# Patient Record
Sex: FEMALE | Race: BLACK OR AFRICAN AMERICAN | NOT HISPANIC OR LATINO | Employment: FULL TIME | URBAN - METROPOLITAN AREA
[De-identification: names, ages, dates, MRNs, and addresses within clinical notes are randomized per-mention and may not be internally consistent; named-entity substitution may affect disease eponyms.]

---

## 2017-01-30 ENCOUNTER — TELEPHONE (OUTPATIENT)
Dept: OBSTETRICS AND GYNECOLOGY | Facility: CLINIC | Age: 32
End: 2017-01-30

## 2017-01-30 DIAGNOSIS — N91.2 AMENORRHEA: Primary | ICD-10-CM

## 2017-01-30 NOTE — TELEPHONE ENCOUNTER
----- Message from Dave Hinkle sent at 1/30/2017 11:39 AM CST -----  Please call and schedule new ob appt lmp 12/12 she can be reached @ [phone]

## 2017-01-31 NOTE — TELEPHONE ENCOUNTER
Pt's LMP 12/12/2016. Per Dr. Chavarria, scheduled pt's US and labs for 12/3/2017 and her visit with  for 2/15/2017. Pt communicated understanding.

## 2017-01-31 NOTE — TELEPHONE ENCOUNTER
----- Message from Dave Hinkle sent at 1/30/2017  4:20 PM CST -----  PT RETURNING YOUR CALL [phone]

## 2017-02-03 ENCOUNTER — PROCEDURE VISIT (OUTPATIENT)
Dept: OBSTETRICS AND GYNECOLOGY | Facility: CLINIC | Age: 32
End: 2017-02-03
Attending: OBSTETRICS & GYNECOLOGY
Payer: COMMERCIAL

## 2017-02-03 ENCOUNTER — LAB VISIT (OUTPATIENT)
Dept: LAB | Facility: OTHER | Age: 32
End: 2017-02-03
Attending: OBSTETRICS & GYNECOLOGY
Payer: COMMERCIAL

## 2017-02-03 ENCOUNTER — RESEARCH ENCOUNTER (OUTPATIENT)
Dept: RESEARCH | Facility: HOSPITAL | Age: 32
End: 2017-02-03

## 2017-02-03 DIAGNOSIS — N91.2 AMENORRHEA: ICD-10-CM

## 2017-02-03 LAB
ABO + RH BLD: NORMAL
BASOPHILS # BLD AUTO: 0.02 K/UL
BASOPHILS NFR BLD: 0.3 %
BLD GP AB SCN CELLS X3 SERPL QL: NORMAL
DIFFERENTIAL METHOD: ABNORMAL
EOSINOPHIL # BLD AUTO: 0.1 K/UL
EOSINOPHIL NFR BLD: 1.1 %
ERYTHROCYTE [DISTWIDTH] IN BLOOD BY AUTOMATED COUNT: 12.5 %
HCG INTACT+B SERPL-ACNC: NORMAL MIU/ML
HCT VFR BLD AUTO: 36.3 %
HGB BLD-MCNC: 12.2 G/DL
LYMPHOCYTES # BLD AUTO: 2.4 K/UL
LYMPHOCYTES NFR BLD: 30.3 %
MCH RBC QN AUTO: 32.2 PG
MCHC RBC AUTO-ENTMCNC: 33.6 %
MCV RBC AUTO: 96 FL
MONOCYTES # BLD AUTO: 0.5 K/UL
MONOCYTES NFR BLD: 6.6 %
NEUTROPHILS # BLD AUTO: 4.9 K/UL
NEUTROPHILS NFR BLD: 61.4 %
PLATELET # BLD AUTO: 331 K/UL
PMV BLD AUTO: 9.4 FL
RBC # BLD AUTO: 3.79 M/UL
WBC # BLD AUTO: 7.98 K/UL

## 2017-02-03 PROCEDURE — 86762 RUBELLA ANTIBODY: CPT

## 2017-02-03 PROCEDURE — 76801 OB US < 14 WKS SINGLE FETUS: CPT | Mod: S$GLB,,, | Performed by: PEDIATRICS

## 2017-02-03 PROCEDURE — 86850 RBC ANTIBODY SCREEN: CPT

## 2017-02-03 PROCEDURE — 86703 HIV-1/HIV-2 1 RESULT ANTBDY: CPT

## 2017-02-03 PROCEDURE — 87591 N.GONORRHOEAE DNA AMP PROB: CPT

## 2017-02-03 PROCEDURE — 86592 SYPHILIS TEST NON-TREP QUAL: CPT

## 2017-02-03 PROCEDURE — 36415 COLL VENOUS BLD VENIPUNCTURE: CPT

## 2017-02-03 PROCEDURE — 85025 COMPLETE CBC W/AUTO DIFF WBC: CPT

## 2017-02-03 PROCEDURE — 86900 BLOOD TYPING SEROLOGIC ABO: CPT

## 2017-02-03 PROCEDURE — 83036 HEMOGLOBIN GLYCOSYLATED A1C: CPT

## 2017-02-03 PROCEDURE — 83020 HEMOGLOBIN ELECTROPHORESIS: CPT

## 2017-02-03 PROCEDURE — 76817 TRANSVAGINAL US OBSTETRIC: CPT | Mod: S$GLB,,, | Performed by: PEDIATRICS

## 2017-02-03 PROCEDURE — 84702 CHORIONIC GONADOTROPIN TEST: CPT

## 2017-02-03 PROCEDURE — 87340 HEPATITIS B SURFACE AG IA: CPT

## 2017-02-03 NOTE — PROGRESS NOTES
2016.165.B TREETOP Consent.     I met with Ms. Valderrama. She is pregnant and here for a visit. I met her in OB ultrasound and escorted her to CTU. We discussed the study in detail, including the purpose, numbers/length, procedures, risk/benefit, cost/payment, contacts (investigators/[facility]), alternatives/voluntary nature/withdrawal, confidentiality/HIPPA. Dr. Mart  was available for questions. She verbalized understanding and had no questions. She declined consented for the optional blood storage and genetic sequencing. The consent form was signed on 2/3/17 at 11:25 am. She also signed a non-coercion statement with Amy Terrazas as a witness.

## 2017-02-03 NOTE — PROCEDURES
Procedures     Indication:     gestation age determination (BHARAT NELSON).   Maternal age (31 years).   ____________________________________________________________________________  History:     Age: 31 years. : 3 Para: 1.   Obstetric History: Mode of last delivery: Vaginal Delivery.  ____________________________________________________________________________  Dating:    LMP: 16 EDC: 17 GA by LMP: 7w4d  Current Scan on: 17 EDC: 17 GA by current scan: 7w4d      Best Overall Assessment: 17 EDC: 17 Assessed GA: 7w4d  The calculation of the gestational age by current scan was based on CRL.  The Best Overall Assessment is based on the LMP.  ____________________________________________________________________________  Early Pregnancy Assessment:    Biometry:  CRL 13.3 mm 91st% 7w4d (7w2d to 7w6d)  Gestational Sac present. Yolk Sac present. Embryo present.   Heart activity: present. Heart rate: 167 bpm.     ____________________________________________________________________________  Maternal Structures:    Right Ovary: not visible.   Left Ovary: normal.   Left Ovary size: 35 mm x 21 mm x 30 mm. Volume: 11.5 ml.   Corpus Luteum Left Ovary: 17 mm x 20 mm x 20 mm. Cystic.  Cul de Sac / Pouch of Melvin: no free fluid visible.  ____________________________________________________________________________  Report Summary:      Impression:     Single viable intrauterine pregnancy consistent with LMP dating.  Embryo grossly WNL with normal cardiac activity.    Normal uterus, cervix and adnexae as noted above.  No fluid seen in cul-de-sac.     Recommendations:     Suggest repeat scan as you feel clinically indicated.

## 2017-02-04 LAB
ESTIMATED AVG GLUCOSE: 103 MG/DL
HBA1C MFR BLD HPLC: 5.2 %
RPR SER QL: NORMAL

## 2017-02-06 LAB
C TRACH DNA SPEC QL NAA+PROBE: NEGATIVE
HBV SURFACE AG SERPL QL IA: NEGATIVE
HIV 1+2 AB+HIV1 P24 AG SERPL QL IA: NEGATIVE
N GONORRHOEA DNA SPEC QL NAA+PROBE: NEGATIVE
RUBV IGG SER-ACNC: 35.1 IU/ML
RUBV IGG SER-IMP: REACTIVE

## 2017-02-07 LAB
HGB FRACT BLD ELPH-IMP: NORMAL
HGB S MFR BLD ELPH: 0 %

## 2017-02-15 ENCOUNTER — OFFICE VISIT (OUTPATIENT)
Dept: OBSTETRICS AND GYNECOLOGY | Facility: CLINIC | Age: 32
End: 2017-02-15
Payer: COMMERCIAL

## 2017-02-15 VITALS
SYSTOLIC BLOOD PRESSURE: 118 MMHG | BODY MASS INDEX: 25.51 KG/M2 | DIASTOLIC BLOOD PRESSURE: 72 MMHG | WEIGHT: 162.5 LBS | HEIGHT: 67 IN

## 2017-02-15 DIAGNOSIS — O09.291 PRIOR PREGNANCY WITH FETAL DEMISE AND CURRENT PREGNANCY IN FIRST TRIMESTER: ICD-10-CM

## 2017-02-15 DIAGNOSIS — Z34.80 SUPERVISION OF OTHER NORMAL PREGNANCY: ICD-10-CM

## 2017-02-15 PROCEDURE — 87086 URINE CULTURE/COLONY COUNT: CPT

## 2017-02-15 PROCEDURE — 87186 SC STD MICRODIL/AGAR DIL: CPT

## 2017-02-15 PROCEDURE — 0500F INITIAL PRENATAL CARE VISIT: CPT | Mod: S$GLB,,, | Performed by: OBSTETRICS & GYNECOLOGY

## 2017-02-15 PROCEDURE — 87088 URINE BACTERIA CULTURE: CPT

## 2017-02-15 PROCEDURE — 87077 CULTURE AEROBIC IDENTIFY: CPT

## 2017-02-15 PROCEDURE — 99999 PR PBB SHADOW E&M-EST. PATIENT-LVL III: CPT | Mod: PBBFAC,,, | Performed by: OBSTETRICS & GYNECOLOGY

## 2017-02-15 NOTE — PATIENT INSTRUCTIONS
Comfort Tips During Pregnancy  Talk with your healthcare provider before using pain-relieving medicine at any time during your pregnancy.    First trimester tips  Nausea  · Get up slowly. Eat a few unsalted crackers before you get out of bed.  · Avoid smells that bother you.  · Eat small bland low fat, light high-protein meals at frequent intervals.  · Sip on water, weak tea, or clear soft drinks, like ginger ale. Eat ice chips.  Fatigue  · Take catnaps when you can.  · Get regular exercise.  · Accept help from others.  · Practice good sleep habits, like going to bed and getting up at the same time each day. Use your bed only for sleep and sex.  Mood swings  · Talk about your feelings with others, including other mothers.  · Limit sugar, chocolate, and caffeine.  · Eat a healthy diet. Dont skip meals.  · Get regular exercise.  Headaches  · Get fresh air and exercise.  · Relax and get enough rest.  · Check with your healthcare provider before taking any pain medicines.  Second trimester tips  Here are some suggestions to help you cope:  · To limit ankle swelling, sit with your feet raised or wear support hose.  · If you have pain in your groin and stomach (round ligament pain), avoid sudden twisting movements.  · For leg cramps, flexing your foot often brings immediate relief. You also may try massaging your calf in long, downward strokes, or stretching your legs before going to bed. Get enough exercise and wear shoes with flexible soles.  Third trimester tips  Reducing heartburn  · Eat small, light meals throughout the day rather than 3 large ones.  · Sleep with your upper body raised 6 inches. Dont lie down until 2 hours after you eat.  Treating constipation  · Eat foods high in fiber (whole-grain foods, fresh fruit and vegetables).  · Drink plenty of water.  · Get regular exercise.  · Discuss other medicines (like docusate and psyllium) with your healthcare provider.  Taking care of your breasts  · Avoid using  harsh soaps or alcohol, which can cause excessive dryness.  · Wear nursing bras. They provide more support than regular bras and can be used after pregnancy if you breastfeed.  Getting a good nights sleep  · Take a warm shower before bed.  · Sleep on a firm mattress.  · Lie on your side with 1 leg crossed over the other.  · Use pillows to support arms, legs, and belly.  Date Last Reviewed: 8/16/2015  © 9594-9505 [employer]. [address] [place] [address], [place] [address]. All rights reserved. This information is not intended as a substitute for professional medical care. Always follow your healthcare professional's instructions.        Established Pregnancy, Normal Symptoms    You are pregnant and are having symptoms that worry you. During pregnancy, its normal to have many kinds of symptoms. Here is a list of common symptoms that happen during pregnancy.  Head and mouth  · Bleeding gums  · Dizziness and fainting  · Extra saliva  · Headaches  · Nosebleeds  · Skin color changes on your face  · Stuffy nose  · Mild blurriness of vision, especially if you wear contact lenses  Breasts  · Darkening of nipples  · Yellow or white discharge from the nipples  · Sore breasts and nipples  · Swollen breasts  Back, arms, and legs  · Back, hip, or thigh pain  · Leg cramps that come and go  · Numbness and tingling in your hands and fingers  · Swollen hands, legs, and feet  · Swollen leg veins  Belly (abdomen) and pelvis  · Constipation  · Feeling of pressure on your bladder and stomach  · Need to urinate often  · Gas and bloating  · Heartburn  · Anal itching, swelling, and bleeding (hemorrhoids)  · Leaking urine  · Mild pressure or cramping in your belly  · Nausea and vomiting throughout the day or night (morning sickness)  · Swollen belly  · Clear to white vaginal discharge  Other symptoms  · Dry, itchy skin  · Forgetfulness  · Less interest in sex  · Mood swings  · Tiredness  · Trouble sleeping  Home care  Here is  information that may help relieve some common pregnancy symptoms.  Sore and swollen breasts  · Wear a support bra that fits properly.  Nausea and indigestion  · Eat smaller meals or snacks more often.  · Eat bland foods, such as bananas, crackers, or rice.  · Stay away from spicy, fatty, or fried foods.  · Stay away from alcohol, caffeine, and tobacco.  · Dont lie down right after eating.  · Raise your head with pillows when you lie down.  · Eat foods or beverages that have ginger. If you drink ginger ale, be sure to make sure it has real ginger and not just ginger flavoring.  Leg swelling and varicose veins  · Wear elastic support hose. Put your feet up as often as possible.  Constipation  · Eat more fresh fruits and vegetables and more whole grains. Drink more clear liquids.  Joint and muscle pains  · Avoid heavy lifting.  · Pick things up by bending at your knees, not at your waist.  · Use acetaminophen for joint and muscle pain. Don't use aspirin, ibuprofen, or naproxen.  Mouth and nose dryness or bleeding  · Drink more liquids. Use a vaporizer or humidifier in your bedroom.  Dont take medicines or use remedies that your healthcare provider hasnt approved. If you have symptoms that are severe or sudden, call your healthcare provider.  Call 911  Call 911 if any of these occur:  · New chest, arm, shoulder, neck, or upper back pain  · Trouble breathing  · Severe belly pain or very heavy bleeding  · Severe lightheadedness, passing out, or fainting  · Rapid heart rate  · Confusion or difficulty waking up  When to seek medical advice  Call your healthcare provider right away if any of these occur:  · Burning or pain when you urinate  · Depression or severe anxiety  · Desire to eat or drink nonfood items such as paper, dirt, or cleaning products  · Diarrhea that lasts more than 24 hours  · Fast heartbeat or heart palpitations  · Fever of 100.4°F (38°C) or higher, or as directed by your healthcare provider  · You  cant keep fluids down for 6 hours without vomiting  · Severe or ongoing vomiting  · Little or no urine  · Major vision changes  · Moderate or severe belly pain  · Severe back pain  · Severe constipation  · Severe cramping or swelling in a leg, especially if its just on one side  · Severe headache  · Sudden swelling of your face, hands, feet, or ankles  · Vaginal bleeding  · Very itchy skin that doesnt get better  Date Last Reviewed: 10/1/2016  © 5275-2758 [employer]. [address] [place] [address], [place] [address]. All rights reserved. This information is not intended as a substitute for professional medical care. Always follow your healthcare professional's instructions.        Healthy Eating Habits During Pregnancy    Its important to develop healthy eating habits while you are pregnant, for you as well as for your baby. Here are some ways to stay healthy.  Aim for a healthy weight  A slow, steady rate of weight gain is often best. After the first trimester, you may gain about a pound a week. If you were overweight before pregnancy, you need to gain fewer pounds. Your healthcare provider can give you a healthy weight goal for your pregnancy.  Dont diet  Now is not the time to diet. You may not get enough of the nutrients you and your baby need. Instead, learn how to be a healthy eater. Start by doing it for your baby. Soon, you may do it for yourself.  Vitamins and supplements  Talk with your healthcare provider about taking these and other prenatal vitamins and supplements.  · Iron makes the extra blood you need now.  · Calcium and vitamin D help build and keep strong bones.  · Folic acid helps prevent certain birth defects.  · Some vitamins may not be safe to take. Your healthcare provider will tell you which ones to avoid.  Fluids  Drink at least 8 to 10 cups of fluid daily. Your baby needs fluids. Fluids also decrease constipation, flush out toxins and waste, limit swelling, and help prevent  bladder infections. Water is best. Other good choices are:  · Water or seltzer water with a slice of lemon or lime. (These can also help ease an upset stomach.)  · Clear soups that are low in salt  · Low-fat or fat-free milk; soy or rice milk with calcium added  · 100% fruit juices mixed with water  · Popsicles or gelatin  Things to avoid  Some things might harm your growing baby. Dont eat or drink:  · Alcohol  · Unpasteurized dairy foods and juices  · Raw or undercooked meat, poultry, fish, or eggs  · Prepared meats, like deli meats or hot dogs, unless heated until steaming hot  · Fish that are high in mercury, like shark, swordfish, keyla mackerel, tilefish, and albacore tuna   Things to limit  Ask your healthcare provider whether its safe to eat or drink:  · Caffeine  · Artificial sweeteners  · Organ meats  · Certain types of fish  · Fish and shellfish that contain mercury in lower amounts, like shrimp, canned light tuna, salmon, pollock, and catfish   Date Last Reviewed: 8/16/2015  © 2687-3268 [employer]. [address] [place] [address]. All rights reserved. This information is not intended as a substitute for professional medical care. Always follow your healthcare professional's instructions.        Exercise During Pregnancy  Regular exercise can help you adapt to the changes your body is going through during pregnancy. Exercising may help you relax, and it gets you ready for labor and delivery. Talk to your healthcare provider about the kinds of activities you can do. Then go ahead and enjoy them.    Get started  Even if you didnt exercise before pregnancy, it is not too late to start. Choose an activity that you like and that fits your lifestyle. Begin slowly and build up a little at a time. Be sure to check with your healthcare provider before starting any exercise program. The following tips may help you get started:  · Choose a time and place to exercise each day.  · Wear  loose-fitting clothes and comfortable athletic shoes.  · Stretch before and after you exercise. (Be sure to stretch slowly and to hold stretches for 30 to 40 seconds.)  Be active  Unless your healthcare provider says otherwise, try to exercise for 30 minutes or more most days of the week:  · Overall conditioning, like swimming, bicycling, or walking, is especially beneficial.  · Aerobics and exercises that increase your pulse rate help condition your body and strengthen your heart. Ask about special prenatal aerobics classes.  Exercise safely  These tips will help you have a safe, healthy workout:  · Stay cool. Stop exercising if you feel overheated.  · Slow down if youre out of breath. If you cant talk during exercise, lower the intensity of the workout.  · Monitor the intensity of your workout. Only do moderate-intensity (not strenuous) exercise.  · Stay off your back. Lying on your back can decrease blood flow to your baby.  · Drink water before, during and after your workout.  · Eat 300 extra calories a day. A light snack before and after you exercise will help keep your energy up.  · Avoid activities requiring balancing skills later in pregnancy.  Do Kegel exercises  Kegel exercises strengthen the pelvic floor muscles used in childbirth. These muscles are the same ones used to stop the flow of urine. Do Kegel exercises daily:  · Squeeze your pelvic floor muscles for a count of 3.  · Relax, then squeeze again.  · Repeat 10-15 times in a row at least 3 times a day.  · You can do Kegel exercises anytime and anywhere.  Keep walking  No matter what other exercise you do, try to walk whenever you can:  · If youre working all day, take a lunchtime walk in the park with a friend.  · When you shop, park away from the store entrance and walk the extra distance.  · Take the stairs instead of the elevator.     When to stop exercising and call your healthcare provider  Call your healthcare provider right away if you  have:  · Shortness of breath before starting exercise  · Vaginal bleeding  · Dizziness or feeling faint  · Chest pain  · Headache  · Decreased fetal movement  ·  contractions   · Muscle weakness  · Calf pain or swelling  · Fluid leaking from the vagina   Date Last Reviewed: 2015 [employer]. [address] [place] [address]. All rights reserved. This information is not intended as a substitute for professional medical care. Always follow your healthcare professional's instructions.        Nutrition During Pregnancy    Having a healthy baby depends mostly on you. What you eat matters to your baby and your health. During pregnancy, you will likely need about 300 more calories per day than before you became pregnant. Each day, try to eat the number of servings listed here for each food group. In addition, cut down on salt and caffeine. Limit the amount of sweets and high-fat foods you eat. Dont smoke or drink alcohol.  Important: See your healthcare provider as often as requested. If you have any questions, be sure to ask them.  Fruits  2 cups  Examples of 1-cup servings:  1 medium apple  1 medium orange  1 medium banana  1 cup chopped fruit  1 cup 100% fruit juice (pasteurized)  1/2 cup dried fruit Vegetables  2-1/2 to 3 cups   Examples of 1 servin cups raw, leafy greens  1 cup raw or cooked cut-up vegetables  1 cup 100% vegetable juice (pasteurized) Grains & Cereals*  6 to 8 ounces  Examples of 1-ounce servings:  1 slice bread  1/2 cup cooked rice  1/2 cup cooked cereal  1/2 cup pasta  1 ounce cold cereal Fats & Oils  6 to 8 teaspoons   Dairy**  3 cups  Examples of 1-cup servings:  1 cup milk  1 cup yogurt  1-1/2 ounces natural cheese  2 ounces processed cheese Protein---  5 to 6-1/2 ounces  Examples of 1-ounce servings:  1 egg  1 ounce of lean meat, poultry, or fish  1/4 cup cooked beans  1 tablespoon peanut butter  1/2 ounce nuts Fluids  8 or more 8-ounce  glasses  Examples:  Water  Diluted juices: Apple, orange, cranberry  Mineral water  Clear soups, broth     *Note: Choose whole grains whenever possible.  ** Note: Try to choose low-fat options; avoid soft cheeses and unpasteurized milk.  --- Notes: Avoid raw or undercooked meats, eggs, and seafood. fish, and shellfish. Also, some types of fish, like shark, swordfish, and keyla mackerel should not be eaten during pregnancy. Avoid hot dogs, luncheon meats, and cold cuts unless heated to steaming just prior to being served. Ask your healthcare provider about safe choices.     Prenatal supplements  A prenatal supplement is a pill that you take daily during pregnancy. It helps make sure youre getting the right amount of certain nutrients that are important to your baby. Ask your healthcare provider to help you choose the best one for you. Important nutrients during pregnancy include:  · Folic acid. It's best to start taking this supplement 1 month before you start trying to get pregnant. Folic acid helps prevent certain problems in your baby. During pregnancy, you need to take 400 micrograms (mcg) of folic acid every day for the first 2 to 3 months after conception, and then 600 mcg is needed for growing fetus and placenta.  · Iron, calcium, and vitamin D. You may also be advised to take these supplements during pregnancy. They help keep you and your baby healthy. Be sure to take them at different times because calcium makes it hard for the body to absorb iron. Taking iron with orange juice helps to increase its absorption.   Date Last Reviewed: 8/9/2015 © 2000-2016 [employer]. [address] [place] [address], [place] [address]. All rights reserved. This information is not intended as a substitute for professional medical care. Always follow your healthcare professional's instructions.        Pregnancy    Your exam today shows that you are pregnant.  Pregnancy symptoms  During pregnancy your bodys hormones  change. This causes physical and emotional changes. This is normal. Knowing what to expect is important for your piece of mind and so you know when to seek help for a problem. Here are some of the most common symptoms:  · Morning sickness or nausea. This can happen any time of the day or night.  · Tender, swollen breasts  · Need to urinate frequently  · Tiredness or fatigue  · Dizziness  · Indigestion or heartburn  · Food cravings or turn-offs  · Constipation  · Emotional changes. This can range from anxiety to excitement to depression.  General care for a healthy pregnancy  Here are things you can do to help make sure your baby is born healthy:  · Rest when you feel tired. This is especially true in the later months of pregnancy.  · Drink more fluids. Your body needs more fluids than you may be used to. Drink 8 to10 glasses of juice, milk, or water every day.  · Eat well-balanced meals. Eat at regular times to give your body enough protein. You can expect to gain about 30 pounds during the pregnancy. Dont try to diet or lose weight while you are pregnant.  · Take a prenatal vitamin every day. This helps you meet the extra nutritional needs of pregnancy.  · Dont take any other medicine during your pregnancy unless your healthcare provider tells you to. This includes prescription medicines and those you buy over the counter. Many medicines can harm the growing baby.  · If you have nausea or vomiting, dont eat greasy or fried foods. Eat several smaller meals throughout the day rather than 3 large meals.  · If you smoke, you must stop. The nicotine you breathe in goes right to the baby.  · Stay away from alcohol, even in moderate amounts. Daily drinking will harm your baby and can cause permanent brain damage.  · Dont use recreational drugs, especially cocaine, crack, and heroin. These will harm your baby. Also avoid marijuana.  · If you were using recreational drugs or prescribed medicine when you found out that  you were pregnant, talk with your healthcare provider about possible effects on your growing baby.  · If you have medical problems that you need to take medicine for, talk with your healthcare provider.  Follow-up care  Call your healthcare provider to arrange for prenatal care. Prenatal care is important. You can see your family provider, a pregnancy specialist (obstetrician), or a primary care clinic.  When to seek medical advice  Call your healthcare provider right away if any of these occur:  · Vaginal bleeding  · Pain in your belly (abdomen) or back that is moderate or severe  · Lots of vomiting, or you cant keep any fluids down for 6 hours  · Burning feeling when you urinate  · Headache, dizziness, or rapid weight gain  · Fever  · Vision changes or blurred vision  Date Last Reviewed: 10/1/2016  © 8576-0327 [employer]. [address] [place] [address], [place] [address]. All rights reserved. This information is not intended as a substitute for professional medical care. Always follow your healthcare professional's instructions.

## 2017-02-15 NOTE — PROGRESS NOTES
CC: Absence of menses    Maribel Valderrama is a 31 y.o. female  with Patient's last menstrual period was 2016 (exact date). presents with complaint of absence of menstruation.  She reports nausea, Denies vomIting/abdominal pain/bleeding.  UPT is positive.     History reviewed. No pertinent past medical history.  Past Surgical History   Procedure Laterality Date    Ovarian teratoma removal Right 2010     15 weeks gestation, DERMOID RIGHT     Da sary-assisted resection of complex left ovarian cyst x2 Left 2016     DONNA done at time of surgery -Dermoid and Hemorrhagic ovarian cyst     Social History     Social History    Marital status:      Spouse name: Nomi    Number of children: 1    Years of education: N/A     Occupational History    Registered Nurse       [employer]     Social History Main Topics    Smoking status: Never Smoker    Smokeless tobacco: Never Used    Alcohol use No    Drug use: No    Sexual activity: Yes     Partners: Male     Birth control/ protection: None      Comment:  to [facility] since      Other Topics Concern    None     Social History Narrative     Family History   Problem Relation Age of Onset    Hypertension Mother     Hypertension Father     Breast cancer Other     Cancer Other      cervical cancer    Diabetes Maternal Uncle     Ovarian cancer Neg Hx     Stroke Neg Hx     Colon cancer Neg Hx     Celiac disease Neg Hx     Cirrhosis Neg Hx     Colon polyps Neg Hx     Crohn's disease Neg Hx     Esophageal cancer Neg Hx     Inflammatory bowel disease Neg Hx     Liver cancer Neg Hx     Liver disease Neg Hx     Rectal cancer Neg Hx     Stomach cancer Neg Hx     Ulcerative colitis Neg Hx      OB History    Para Term  AB SAB TAB Ectopic Multiple Living   3 1 1  1 1    1      # Outcome Date GA Lbr Zeus/2nd Weight Sex Delivery Anes PTL Lv   3 Current            2 SAB 16 18w5d 20:00 / 00:13 0.28 kg (9.9 oz)  "M Vag-Spont OTHER Y FD   1 Term 09/25/10 39w4d  3.697 kg (8 lb 2.4 oz) M Vag-Spont EPI  Y          Visit Vitals    /72    Ht 5' 7" (1.702 m)    Wt 73.7 kg (162 lb 7.7 oz)    LMP 12/12/2016 (Exact Date)    BMI 25.45 kg/m2       ROS:  GENERAL: Denies weight gain or weight loss. Feeling well overall.   SKIN: Denies rash or lesions.   HEAD: Denies head injury or headache.   NODES: Denies enlarged lymph nodes.   CHEST: Denies chest pain or shortness of breath.   CARDIOVASCULAR: Denies palpitations or left sided chest pain.   ABDOMEN: No abdominal pain, constipation, diarrhea, nausea, vomiting or rectal bleeding.   URINARY: No frequency, dysuria, hematuria, or burning on urination.  REPRODUCTIVE: See HPI.   BREASTS: The patient performs breast self-examination and denies pain, lumps, or nipple discharge.   HEMATOLOGIC: No easy bruisability or excessive bleeding.   MUSCULOSKELETAL: Denies joint pain or swelling.   NEUROLOGIC: Denies syncope or weakness.   PSYCHIATRIC: Denies depression, anxiety or mood swings.    PE:   APPEARANCE: Well nourished, well developed, in no acute distress.  AFFECT: WNL, alert and oriented x 3.  SKIN: No acne or hirsutism.  NECK: Neck symmetric without masses or thyromegaly.  NODES: No inguinal, cervical, axillary or femoral lymph node enlargement.  CHEST: Good respiratory effort.   ABDOMEN: Soft. No tenderness or masses. No hepatosplenomegaly. No hernias.  BREASTS: Symmetrical, no skin changes or visible lesions. No palpable masses, nipple discharge bilaterally.  PELVIC: Normal external female genitalia without lesions. Normal hair distribution. Adequate perineal body, normal urethral meatus. Vagina moist and well rugated without lesions or discharge. Cervix pink, without lesions, discharge or tenderness. No significant cystocele or rectocele. Bimanual exam shows uterus is 9 weeks, regular, mobile and nontender. Adnexa without masses or tenderness.  EXTREMITIES: No " edema.          ASSESSMENT and PLAN:    ICD-10-CM ICD-9-CM    1. Supervision of other normal pregnancy Z34.80 V22.1 US MFM Procedure ([facility])      Urine culture   2. Prior pregnancy with fetal demise and current pregnancy in first trimester O09.291 V23.5 US MFM Procedure ([facility])      Ambulatory consult to Maternal Fetal Medicine         Patient was counseled today on proper weight gain based on the [place] [facility]'s recommendations based on her pre-pregnancy weight. Discussed foods to avoid in pregnancy (i.e. sushi, fish that are high in mercury, deli meat, and unpasteurized cheeses). Discussed prenatal vitamin options (i.e. stool softener, DHA). Contingency screen offered - patient requests.    Return in about 4 weeks (around 3/15/2017).

## 2017-02-18 LAB
BACTERIA UR CULT: NORMAL
BACTERIA UR CULT: NORMAL

## 2017-02-20 ENCOUNTER — TELEPHONE (OUTPATIENT)
Dept: OBSTETRICS AND GYNECOLOGY | Facility: CLINIC | Age: 32
End: 2017-02-20

## 2017-02-20 DIAGNOSIS — N39.0 UTI (URINARY TRACT INFECTION), UNCOMPLICATED: Primary | ICD-10-CM

## 2017-02-20 RX ORDER — AMOXICILLIN 250 MG/1
250 CAPSULE ORAL 3 TIMES DAILY
Qty: 21 CAPSULE | Refills: 0 | Status: SHIPPED | OUTPATIENT
Start: 2017-02-20 | End: 2017-02-27

## 2017-03-09 ENCOUNTER — OFFICE VISIT (OUTPATIENT)
Dept: MATERNAL FETAL MEDICINE | Facility: CLINIC | Age: 32
End: 2017-03-09
Attending: OBSTETRICS & GYNECOLOGY
Payer: COMMERCIAL

## 2017-03-09 ENCOUNTER — OFFICE VISIT (OUTPATIENT)
Dept: OBSTETRICS AND GYNECOLOGY | Facility: CLINIC | Age: 32
End: 2017-03-09
Payer: COMMERCIAL

## 2017-03-09 VITALS
SYSTOLIC BLOOD PRESSURE: 120 MMHG | BODY MASS INDEX: 25.41 KG/M2 | DIASTOLIC BLOOD PRESSURE: 81 MMHG | WEIGHT: 162.25 LBS

## 2017-03-09 VITALS
HEIGHT: 67 IN | BODY MASS INDEX: 25.67 KG/M2 | DIASTOLIC BLOOD PRESSURE: 81 MMHG | SYSTOLIC BLOOD PRESSURE: 120 MMHG | WEIGHT: 163.56 LBS

## 2017-03-09 DIAGNOSIS — Z36.89 ENCOUNTER FOR FETAL ANATOMIC SURVEY: Primary | ICD-10-CM

## 2017-03-09 DIAGNOSIS — N89.8 VAGINAL DISCHARGE: ICD-10-CM

## 2017-03-09 DIAGNOSIS — Z3A.12 12 WEEKS GESTATION OF PREGNANCY: Primary | ICD-10-CM

## 2017-03-09 DIAGNOSIS — O09.291 PRIOR PREGNANCY WITH FETAL DEMISE AND CURRENT PREGNANCY IN FIRST TRIMESTER: ICD-10-CM

## 2017-03-09 DIAGNOSIS — Z87.440 HISTORY OF UTI: ICD-10-CM

## 2017-03-09 DIAGNOSIS — Z36.82 ENCOUNTER FOR NUCHAL TRANSLUCENCY TESTING: ICD-10-CM

## 2017-03-09 DIAGNOSIS — O09.291 PRIOR POOR OBSTETRICAL HISTORY IN FIRST TRIMESTER, ANTEPARTUM: ICD-10-CM

## 2017-03-09 DIAGNOSIS — Z34.80 SUPERVISION OF OTHER NORMAL PREGNANCY: ICD-10-CM

## 2017-03-09 LAB
CANDIDA RRNA VAG QL PROBE: NEGATIVE
G VAGINALIS RRNA GENITAL QL PROBE: NEGATIVE
T VAGINALIS RRNA GENITAL QL PROBE: NEGATIVE

## 2017-03-09 PROCEDURE — 87077 CULTURE AEROBIC IDENTIFY: CPT

## 2017-03-09 PROCEDURE — 87186 SC STD MICRODIL/AGAR DIL: CPT

## 2017-03-09 PROCEDURE — 87480 CANDIDA DNA DIR PROBE: CPT

## 2017-03-09 PROCEDURE — 76801 OB US < 14 WKS SINGLE FETUS: CPT | Mod: 26,S$GLB,, | Performed by: OBSTETRICS & GYNECOLOGY

## 2017-03-09 PROCEDURE — 0502F SUBSEQUENT PRENATAL CARE: CPT | Mod: S$GLB,,, | Performed by: NURSE PRACTITIONER

## 2017-03-09 PROCEDURE — 87088 URINE BACTERIA CULTURE: CPT

## 2017-03-09 PROCEDURE — 99999 PR PBB SHADOW E&M-EST. PATIENT-LVL III: CPT | Mod: PBBFAC,,, | Performed by: NURSE PRACTITIONER

## 2017-03-09 PROCEDURE — 87086 URINE CULTURE/COLONY COUNT: CPT

## 2017-03-09 PROCEDURE — 99999 PR PBB SHADOW E&M-EST. PATIENT-LVL II: CPT | Mod: PBBFAC,,,

## 2017-03-09 PROCEDURE — 99212 OFFICE O/P EST SF 10 MIN: CPT | Mod: 25,S$GLB,, | Performed by: OBSTETRICS & GYNECOLOGY

## 2017-03-09 NOTE — PROGRESS NOTES
Here in urgent care at 12w3d with c/o a vaginal odor x 4 weeks. Denies abnormal discharge. Denies VB and cramping. Had MFM ultrasound this morning. Recently tx for a UTI-finished her meds. Urine cx SOLO done today- Affirm pending.   Pain, bleeding, and ED precautions given.  F/U scheduled 2 weeks with Dr. Chavarria.

## 2017-03-09 NOTE — PROGRESS NOTES
Patient to [facility] clinic for a consult and ultrasound. During consultation with Dr. Solis, patient informed physician that she was recently diagnosed with a urinary tract infection and Dr. Chavarria had started her on antibiotics. Patient has completed the antibiotic dose, but still has complaints of a vaginal odor. Patient would like to be seen if possible.    Dr. Chavarria's office notified and aware. Patient scheduled for an appointment today at the OB Urgent Care center today at 11:40 am.     Patient verbalized understanding of information received.

## 2017-03-09 NOTE — LETTER
March 12, 2017      Alyssia Chavarria MD  [address]  [address]  [facility] [address]           [facility]  [address]  [facility] [address]  Phone: [phone]          Patient: Maribel Valderrama   MR Number: [id number]   YOB: 1985   Date of Visit: 3/9/2017       Dear Dr. Alyssia Chavarria:    Thank you for referring Maribel Valderrama to me for evaluation. Attached you will find relevant portions of my assessment and plan of care.    If you have questions, please do not hesitate to call me. I look forward to following Maribel Valderrama along with you.    Sincerely,    Mary Anne Solis MD    Enclosure  CC:  No Recipients    If you would like to receive this communication electronically, please contact [email or web address] or [phone] to request more information on [employer] Link access.    For providers and/or their staff who would like to refer a patient to [facility], please contact us through our one-stop-shop provider referral line, [facility], at [phone].    If you feel you have received this communication in error or would no longer like to receive these types of communications, please e-mail [email or web address]

## 2017-03-09 NOTE — MR AVS SNAPSHOT
[facility] [address]  [address]   [address]  [facility] [address]  Phone: [phone]                  Maribel Valderrama   3/9/2017 11:40 AM   Office Visit    Description:  Female : 1985   Provider:  Nathaly Mendoza NP   Department:  [facility] [address]           Reason for Visit     Vaginal Discharge           Diagnoses this Visit        Comments    12 weeks gestation of pregnancy    -  Primary            To Do List           Future Appointments        Provider Department Dept Phone    3/20/2017 10:00 AM Alyssia Chavarrai MD [facility] [address] [phone]    2017 3:40 PM ULTRASOUND, [facility] 4TH FLR CLINIC [facility] [phone]      Goals (5 Years of Data)     None      Follow-Up and Disposition     Return if symptoms worsen or fail to improve.      [facility] On Call     [facility] On Call Nurse Care Line -  Assistance  Registered nurses in the [facility] On Call Center provide clinical advisement, health education, appointment booking, and other advisory services.  Call for this free service at [phone].             Medications           Message regarding Medications     Verify the changes and/or additions to your medication regime listed below are the same as discussed with your clinician today.  If any of these changes or additions are incorrect, please notify your healthcare provider.             Verify that the below list of medications is an accurate representation of the medications you are currently taking.  If none reported, the list may be blank. If incorrect, please contact your healthcare provider. Carry this list with you in case of emergency.           Current Medications     PRENATAL VIT W-CA,FE,FA,<1 MG, (PRENATAL VITAMIN ORAL) Take by mouth.           Clinical Reference Information           Prenatal Vitals     Enc. Date GA Prenatal Vitals Prenatal Pulse Pain Level Urine Albumin/Glucose Edema Presentation  "Dilation/Effacement/Station    3/9/17 12w3d 120/81 / 73.6 kg (162 lb 4.1 oz)           3/9/17 12w3d 120/81 / 74.2 kg (163 lb 9.3 oz)           2/15/17 9w2d 118/72 / 73.7 kg (162 lb 7.7 oz) 9 cm / 176    None / None / None         TW.2 kg (2 lb 10.3 oz)   Pregravid weight: 73 kg (160 lb 15 oz)   Number of fetuses: 1   Height: 5' 7" (1.702 m)   BMI: 25.2       Your Vitals Were     BP Height Weight Last Period BMI    120/81 5' 7" (1.702 m) 74.2 kg (163 lb 9.3 oz) 2016 (Exact Date) 25.62 kg/m2      Blood Pressure          Most Recent Value    BP  120/81      Allergies as of 3/9/2017     No Known Allergies      Immunizations Administered on Date of Encounter - 3/9/2017     None      Language Assistance Services     ATTENTION: Language assistance services are available, free of charge. Please call [phone].      ATENCIÓN: Si habla español, tiene a florentino disposición servicios gratuitos de asistencia lingüística. Llame al [phone].     RONA Ý: N?u b?n nói Ti?ng Vi?t, có các d?ch v? h? tr? ngôn ng? mi?n phí dành cho b?n. G?i s? [phone].         [facility] [address] complies with applicable Federal civil rights laws and does not discriminate on the basis of race, color, national origin, age, disability, or sex.        "

## 2017-03-09 NOTE — PROGRESS NOTES
Indication  ========    Consult: First trimester screening /Hx loss at 18wks (BHARAT NELSON).    History  ======    Medical History  Past surgical history: Previous surgeries performed  Surgery: dermoid removal  Year   Details: from right ovary (April)  Surgery: dermoid removal  Year   Details: from left ovary ()  Previous Outcomes  Preg. no. 1  Outcome: Live birth  Gest. age 39 w + 4 d  Gender: male  Details: 8il2oz,   Preg. no. 2  Outcome: Spontaneous miscarriage  Gest. age 18 w + 0 d  Details: demise   3  Para 1  Matias children born living (T) 1  Matias children born (T) 1  Abortions (A) 1  Matias living children (L) 1  Miscarriages 1    Maternal Assessment  =================    Weight 74 kg  Weight (lb) 163 lb  BP syst 120 mmHg  BP diast 81 mmHg    Method  ======    Transabdominal ultrasound examination. View: Good view.    Pregnancy  =========    Matias pregnancy. Number of fetuses: 1.    Dating  ======    LMP on: 2016  Cycle: regular cycle  GA by LMP 12 w + 3 d  BEBE by LMP: 2017  Ultrasound examination on: 3/9/2017  GA by U/S based upon: CRL  GA by U/S 13 w + 1 d  BEBE by U/S: 2017  Assigned: The Best Overall Assessment is based on the LMP.  Assigned GA 12 w + 3 d  Assigned BEBE: 2017    General Evaluation  ==============    Cardiac activity: present.  Placenta: posterior.  Cord vessels: 3 vessel cord.  Amniotic fluid: normal amount.    Fetal Biometry  ============    CRL 69.7 mm 90% 13w 1d Hadlock   bpm 51% Nicolaides    Fetal Anatomy  ============    The following structures appear normal:  Cranium. Neck. Thorax. Abdominal wall.    The following structures were visualized:  GI tract. Urogenital tract. Arms. Legs.    Maternal Structures  ===============    Uterus / Cervix  Uterus: Normal  Cervix: Normal  Ovaries / Tubes / Adnexa  Rt ovary: Visualized  Rt ovary D1 2.6 cm  Rt ovary D2 2.2 cm  Rt ovary D3 1.2 cm  Rt ovary mean 2.0 cm  Rt ovary  vol 3.5 cm³  Lt ovary: Visualized  Lt ovary D1 3.4 cm  Lt ovary D2 2.6 cm  Lt ovary D3 2.5 cm  Lt ovary mean 2.8 cm  Lt ovary vol 11.7 cm³  Lt ovarian cyst(s): Cysts identified  Findings: Simple cyst  D1 20.2 mm  D2 15.5 mm  D3 16.9 mm  Mean 17.5 mm  Vol 2.771 cm³  Pouch of Melvin / Other Structures  Cul de Sac: Visualized    Consultation  ==========    Patient presents to [facility] clinic for consultation and ultrasound    Referring/consulting MD: Dr. Chavarria    32 y.o.  at 12w 3d gestation    Ob hx:  2010, 39w 4d, male, 8 lbs 2oz,   During pregnancy in April patient had laparoscopic removal of dermoid from right ovary  2. 2016: per gestational age was 18 wks with missed   -went to a 4D facility and was informed of no FHR  -same day went to a hospital facility where an ultrasound was performed: patient reported she was  Told fetus more consistent with about 15 wks gestation/no FHR  -induced with   -Pathology report:  -fetus measured 55 grams suggestive of about 14-15 wks  -placenta: wt c/w 14 wks gestation, amnion/chorion with mild acute amnionitis/focal meconium within amnion  Chorionic villi with focal acute intervillositis/microabscesses and fibrin      Past medical hx: noncontributory    Gyn HX  1. 2010 during pregnancy had laparoscopic removal of right dermoid  2.  laparoscopic removal of left hemorrhagic ovarian cyst    Ultrasound performed: see report for details    Counseling: NT screen  Reason for referral: NT screen and counseling    The patient was counseled on NT screen including sensitivities, false positive rate, and logistics of the test. The patient was counseled on the  relationship between maternal age and genetic aneuploidy. The patient was counseled on tests/procedures available to her if abnormal NT  screen: CVS, amniocentesis and NIPT (ex. Materni T21). The details of the available tests were explained. Patient's questions were answered.  She declined NT  screen.    Counseling: ob hx  I counseled patient on her obstetric history. It appears that the 2016 pregnancy was a late first/early second trimester missed  based  on pathology evaluation of fetus and placenta. Patient counseled on recommendations/follow up for this pregnancy. Her questions were  answered.    I spent about 25 minutes in counseling separate from ultrasound evaluation        .    Impression  =========    Single viable intrauterine pregnancy consistent with established dating  Fetus grossly WNL with normal cardiac activity  No evidence of nuchal translucency thickening visualized today  Normal uterus and cervix  adnexae as noted above:  right ovary wnl  left ovary with small simple cyst  No fluid seen in cul-de-sac.    Recommendation  ==============    1. I recommend fetal anatomical survey in 7-8 wks: MFM will schedule appt .

## 2017-03-13 LAB — BACTERIA UR CULT: NORMAL

## 2017-03-20 ENCOUNTER — ROUTINE PRENATAL (OUTPATIENT)
Dept: OBSTETRICS AND GYNECOLOGY | Facility: CLINIC | Age: 32
End: 2017-03-20
Attending: OBSTETRICS & GYNECOLOGY
Payer: COMMERCIAL

## 2017-03-20 VITALS
DIASTOLIC BLOOD PRESSURE: 58 MMHG | BODY MASS INDEX: 25.55 KG/M2 | WEIGHT: 163.13 LBS | SYSTOLIC BLOOD PRESSURE: 100 MMHG

## 2017-03-20 DIAGNOSIS — R10.2 PELVIC PRESSURE IN PREGNANCY, ANTEPARTUM, SECOND TRIMESTER: ICD-10-CM

## 2017-03-20 DIAGNOSIS — O26.892 PELVIC PRESSURE IN PREGNANCY, ANTEPARTUM, SECOND TRIMESTER: ICD-10-CM

## 2017-03-20 DIAGNOSIS — Z34.80 SUPERVISION OF OTHER NORMAL PREGNANCY: Primary | ICD-10-CM

## 2017-03-20 DIAGNOSIS — O23.42 UTI IN PREGNANCY, ANTEPARTUM, SECOND TRIMESTER: ICD-10-CM

## 2017-03-20 DIAGNOSIS — O09.291 PRIOR PREGNANCY WITH FETAL DEMISE AND CURRENT PREGNANCY IN FIRST TRIMESTER: ICD-10-CM

## 2017-03-20 PROBLEM — O26.899 PELVIC PRESSURE IN PREGNANCY, ANTEPARTUM: Status: ACTIVE | Noted: 2017-03-20

## 2017-03-20 PROCEDURE — 99999 PR PBB SHADOW E&M-EST. PATIENT-LVL II: CPT | Mod: PBBFAC,,, | Performed by: OBSTETRICS & GYNECOLOGY

## 2017-03-20 PROCEDURE — 0502F SUBSEQUENT PRENATAL CARE: CPT | Mod: S$GLB,,, | Performed by: OBSTETRICS & GYNECOLOGY

## 2017-03-20 RX ORDER — SULFAMETHOXAZOLE AND TRIMETHOPRIM 800; 160 MG/1; MG/1
1 TABLET ORAL 2 TIMES DAILY
Qty: 14 TABLET | Refills: 0 | Status: SHIPPED | OUTPATIENT
Start: 2017-03-20 | End: 2017-04-24

## 2017-03-20 NOTE — PROGRESS NOTES
Patient with reports of pelvic pressure. Urine culture shows Proteus. Will treat with Bactrim DS. . Advised to get support belt for pelvic pressure.

## 2017-04-12 ENCOUNTER — TELEPHONE (OUTPATIENT)
Dept: OBSTETRICS AND GYNECOLOGY | Facility: CLINIC | Age: 32
End: 2017-04-12

## 2017-04-12 NOTE — TELEPHONE ENCOUNTER
----- Message from Iwona Paz sent at 4/12/2017  7:49 AM CDT -----  Contact: Self  Pt is calling because she is having vaginal discomfort and feels she needs to be seen today.  Pt is requesting a returned call for scheduling.    She can be reached at [phone].    Thank you.

## 2017-04-12 NOTE — TELEPHONE ENCOUNTER
Pt reports feeling vaginal discomfort when she stands. Pt says she has felt this discomfort throughout the pregnancy but it has intensified as time goes on. Pt says she only gets relief when sits. Pt says that it feels like a burning/aching sensation in her groin. Pt accepted an appointment in urgent care tomorrow at 10:00 AM at [facility] in [place] [address]. Pt communicated understanding.

## 2017-04-13 ENCOUNTER — OFFICE VISIT (OUTPATIENT)
Dept: OBSTETRICS AND GYNECOLOGY | Facility: CLINIC | Age: 32
End: 2017-04-13
Payer: COMMERCIAL

## 2017-04-13 VITALS
WEIGHT: 164.56 LBS | DIASTOLIC BLOOD PRESSURE: 64 MMHG | BODY MASS INDEX: 25.78 KG/M2 | SYSTOLIC BLOOD PRESSURE: 120 MMHG

## 2017-04-13 DIAGNOSIS — K59.00 CONSTIPATION DURING PREGNANCY, SECOND TRIMESTER: ICD-10-CM

## 2017-04-13 DIAGNOSIS — R10.2 PELVIC PRESSURE IN PREGNANCY, ANTEPARTUM, SECOND TRIMESTER: Primary | ICD-10-CM

## 2017-04-13 DIAGNOSIS — O26.892 PELVIC PRESSURE IN PREGNANCY, ANTEPARTUM, SECOND TRIMESTER: Primary | ICD-10-CM

## 2017-04-13 DIAGNOSIS — O99.612 CONSTIPATION DURING PREGNANCY, SECOND TRIMESTER: ICD-10-CM

## 2017-04-13 DIAGNOSIS — Z3A.17 17 WEEKS GESTATION OF PREGNANCY: ICD-10-CM

## 2017-04-13 PROCEDURE — 87480 CANDIDA DNA DIR PROBE: CPT

## 2017-04-13 PROCEDURE — 99999 PR PBB SHADOW E&M-EST. PATIENT-LVL II: CPT | Mod: PBBFAC,,, | Performed by: NURSE PRACTITIONER

## 2017-04-13 PROCEDURE — 99213 OFFICE O/P EST LOW 20 MIN: CPT | Mod: S$GLB,,, | Performed by: NURSE PRACTITIONER

## 2017-04-13 RX ORDER — DOCUSATE SODIUM 100 MG/1
100 CAPSULE, LIQUID FILLED ORAL DAILY PRN
Qty: 30 CAPSULE | Refills: 3 | Status: SHIPPED | OUTPATIENT
Start: 2017-04-13 | End: 2018-04-13

## 2017-04-13 NOTE — PROGRESS NOTES
OB appt at 17w3d presents to urgent care with c/o pelvic pressure.  Denies VB and cramping. States pelvic pressure is not new, but has worsened. Pressure is better when sitting and worse with standing. 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 Pain, bleeding, and PTL precautions given.  FHTs verified-160 bpm. SSE-cervix visually closed. Affirm pending. Pt finished course of bactrim for UTI-denies any s/s today. Pt is taking intermittent FMLA from work.   F/U scheduled 2 weeks with Dr. Chavarria

## 2017-04-17 ENCOUNTER — TELEPHONE (OUTPATIENT)
Dept: RESEARCH | Facility: HOSPITAL | Age: 32
End: 2017-04-17

## 2017-04-24 ENCOUNTER — RESEARCH ENCOUNTER (OUTPATIENT)
Dept: RESEARCH | Facility: HOSPITAL | Age: 32
End: 2017-04-24

## 2017-04-24 ENCOUNTER — ROUTINE PRENATAL (OUTPATIENT)
Dept: OBSTETRICS AND GYNECOLOGY | Facility: CLINIC | Age: 32
End: 2017-04-24
Attending: OBSTETRICS & GYNECOLOGY
Payer: COMMERCIAL

## 2017-04-24 VITALS
BODY MASS INDEX: 25.83 KG/M2 | WEIGHT: 164.88 LBS | SYSTOLIC BLOOD PRESSURE: 100 MMHG | DIASTOLIC BLOOD PRESSURE: 58 MMHG

## 2017-04-24 DIAGNOSIS — O09.292 PRIOR PREGNANCY WITH FETAL DEMISE AND CURRENT PREGNANCY IN SECOND TRIMESTER: ICD-10-CM

## 2017-04-24 DIAGNOSIS — R10.2 PELVIC PRESSURE IN PREGNANCY, ANTEPARTUM, SECOND TRIMESTER: ICD-10-CM

## 2017-04-24 DIAGNOSIS — O26.892 PELVIC PRESSURE IN PREGNANCY, ANTEPARTUM, SECOND TRIMESTER: ICD-10-CM

## 2017-04-24 DIAGNOSIS — Z34.80 SUPERVISION OF OTHER NORMAL PREGNANCY: Primary | ICD-10-CM

## 2017-04-24 PROCEDURE — 87086 URINE CULTURE/COLONY COUNT: CPT

## 2017-04-24 PROCEDURE — 99999 PR PBB SHADOW E&M-EST. PATIENT-LVL II: CPT | Mod: PBBFAC,,, | Performed by: OBSTETRICS & GYNECOLOGY

## 2017-04-24 PROCEDURE — 0502F SUBSEQUENT PRENATAL CARE: CPT | Mod: S$GLB,,, | Performed by: OBSTETRICS & GYNECOLOGY

## 2017-04-24 NOTE — PROGRESS NOTES
Pregnancy at 19w0d with recent pelvic pressure. Urine not sent at that time. Had UTI in first trimester, Will send urine for culture. Reports +FM.

## 2017-04-24 NOTE — PROGRESS NOTES
2016.165.[facility]     After Ms. Valderrama's appointment, I escorted her to CTU to have blood drawn. After her blood was drawn, she was given her $25.00 gift card and I escorted her to the [facility].      Blood was drawn at 13:48. Placed in centrifuge at room temperature at 14:11. Minimal hemolysis in both tubes. Tubes were combined at 14:22. Twenty 250 ul aliquots were place in the -80 freezer at 14:31.    Patient informed me she had a UTI which she took Bactrim for otherwise no changes in medical history. No changes in her social history. Patient confirmed that she had a signed copy of the informed consent.

## 2017-04-25 ENCOUNTER — OFFICE VISIT (OUTPATIENT)
Dept: MATERNAL FETAL MEDICINE | Facility: CLINIC | Age: 32
End: 2017-04-25
Attending: OBSTETRICS & GYNECOLOGY
Payer: COMMERCIAL

## 2017-04-25 DIAGNOSIS — Z36.89 ENCOUNTER FOR ULTRASOUND TO CHECK FETAL GROWTH: Primary | ICD-10-CM

## 2017-04-25 DIAGNOSIS — Z36.89 ENCOUNTER FOR FETAL ANATOMIC SURVEY: ICD-10-CM

## 2017-04-25 PROCEDURE — 76805 OB US >/= 14 WKS SNGL FETUS: CPT | Mod: S$GLB,,, | Performed by: OBSTETRICS & GYNECOLOGY

## 2017-04-25 PROCEDURE — 99203 OFFICE O/P NEW LOW 30 MIN: CPT | Mod: 25,S$GLB,, | Performed by: OBSTETRICS & GYNECOLOGY

## 2017-04-25 PROCEDURE — 76817 TRANSVAGINAL US OBSTETRIC: CPT | Mod: S$GLB,,, | Performed by: OBSTETRICS & GYNECOLOGY

## 2017-04-25 NOTE — PROGRESS NOTES
On ultrasound today the presence of a marginal previa was seen.  I discussed with patient the bleeding risks associated with placenta previa.  I also reviewed the normal growth of the uterus and the high likelihood that the placental edge will migrate away from the cervical os.  After today's visit I would like to reinspect placental location at 32-36 weeks EGA to confirm it has moved from the cervical os.  Bleeding precautions and pelvic rest reviewed with patient today.     Choroid plexus cyst counseling and recommendations:    Bilateral choroid plexus cysts were seen on ultrasound today. A detailed fetal anatomic ultrasound exam was performed, and no other sonographic findings associated with trisomy 18 were identified. The patient is at low risk for trisomy 18 based on age, family history, and/or prior screening. Given these factors, no further evaluation for trisomy 18 is recommended.   In the absence of sonographic abnormalities or increased risk for trisomy 18, choroid plexus cysts are considered benign incidental findings, which are seen in approximately 1 - 2% of fetuses during the second trimester. They are not considered a functional or structural abnormality of the brain and thus require neither serial  sonograms nor any evaluation after birth.       Results of today's ultrasound discussed with patient.  I spent 30 minutes with patient today over half of which was in consultation separate of her ultrasound examination.     Referring physician to receive copy of today's consultation via electronic medical record.

## 2017-04-26 LAB — BACTERIA UR CULT: NO GROWTH

## 2017-05-01 ENCOUNTER — ROUTINE PRENATAL (OUTPATIENT)
Dept: OBSTETRICS AND GYNECOLOGY | Facility: CLINIC | Age: 32
End: 2017-05-01
Attending: OBSTETRICS & GYNECOLOGY
Payer: COMMERCIAL

## 2017-05-01 VITALS — BODY MASS INDEX: 26.31 KG/M2 | DIASTOLIC BLOOD PRESSURE: 56 MMHG | SYSTOLIC BLOOD PRESSURE: 108 MMHG | WEIGHT: 168 LBS

## 2017-05-01 DIAGNOSIS — O09.292 PRIOR PREGNANCY WITH FETAL DEMISE AND CURRENT PREGNANCY IN SECOND TRIMESTER: ICD-10-CM

## 2017-05-01 DIAGNOSIS — Z34.80 SUPERVISION OF OTHER NORMAL PREGNANCY: Primary | ICD-10-CM

## 2017-05-01 DIAGNOSIS — O26.892 PELVIC PRESSURE IN PREGNANCY, ANTEPARTUM, SECOND TRIMESTER: ICD-10-CM

## 2017-05-01 DIAGNOSIS — R10.2 PELVIC PRESSURE IN PREGNANCY, ANTEPARTUM, SECOND TRIMESTER: ICD-10-CM

## 2017-05-01 PROCEDURE — 99999 PR PBB SHADOW E&M-EST. PATIENT-LVL II: CPT | Mod: PBBFAC,,, | Performed by: OBSTETRICS & GYNECOLOGY

## 2017-05-01 PROCEDURE — 0502F SUBSEQUENT PRENATAL CARE: CPT | Mod: S$GLB,,, | Performed by: OBSTETRICS & GYNECOLOGY

## 2017-05-01 NOTE — PROGRESS NOTES
Pregnancy at 20w0d, patient arrived today requesting time off because of pelvic discomfort. Noted to have vulvar varicosities noted at last visit one week ago. SHe is also being urged by family to take time off because of marginal placenta previa and history of prior pregnancy loss, IUFD which occurred at 18w5d with no obvious cause. 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 She states she is uncertain how much longer she can continue working full time due to discomfort. Advised to get support belt. Limit lifting to no more than 20 pounds.Reassured that since she only has a marginal previa, although pelvic rest is needed at this time, that it is likely that the previa will resolve prior to delivery/3rd trimester.

## 2017-05-01 NOTE — LETTER
May 1, 2017    Maribel Valderrama  [address] [address]         [facility] [address]  [address]  [facility] [address]  Phone: [phone]  Fax: [phone] May 1, 2017     Patient: Maribel Valderrama   YOB: 1985   Date of Visit: 5/1/2017       To Whom It May Concern:    It is my medical opinion that Maribel Valderrama should reduce work hours to as tolerated for the remainder of the pregnancy, and with no heavy lifting beyond 20 pounds for the remainder of the pregnancy due to pregnancy condition causing severe discomfort with prolonged standing;, and history of prior pregnancy loss.    If you have any questions or concerns, please don't hesitate to call.    Sincerely,    Alyssia Chavarria MD

## 2017-05-23 ENCOUNTER — ROUTINE PRENATAL (OUTPATIENT)
Dept: OBSTETRICS AND GYNECOLOGY | Facility: CLINIC | Age: 32
End: 2017-05-23
Attending: OBSTETRICS & GYNECOLOGY
Payer: COMMERCIAL

## 2017-05-23 VITALS
SYSTOLIC BLOOD PRESSURE: 112 MMHG | DIASTOLIC BLOOD PRESSURE: 50 MMHG | WEIGHT: 169.56 LBS | BODY MASS INDEX: 26.55 KG/M2

## 2017-05-23 DIAGNOSIS — Z34.80 SUPERVISION OF OTHER NORMAL PREGNANCY: Primary | ICD-10-CM

## 2017-05-23 DIAGNOSIS — O09.292 PRIOR PREGNANCY WITH FETAL DEMISE AND CURRENT PREGNANCY IN SECOND TRIMESTER: ICD-10-CM

## 2017-05-23 DIAGNOSIS — O44.20 MARGINAL PLACENTA PREVIA: ICD-10-CM

## 2017-05-23 PROCEDURE — 0502F SUBSEQUENT PRENATAL CARE: CPT | Mod: S$GLB,,, | Performed by: OBSTETRICS & GYNECOLOGY

## 2017-05-23 PROCEDURE — 99999 PR PBB SHADOW E&M-EST. PATIENT-LVL II: CPT | Mod: PBBFAC,,, | Performed by: OBSTETRICS & GYNECOLOGY

## 2017-05-23 NOTE — PROGRESS NOTES
Pregnancy at 23w1d, states feeling better, but very tired. Reports good FM. Denies contractions, LOF or bleeding.  Glucose screen next visit.

## 2017-05-24 ENCOUNTER — TELEPHONE (OUTPATIENT)
Dept: OBSTETRICS AND GYNECOLOGY | Facility: CLINIC | Age: 32
End: 2017-05-24

## 2017-05-24 NOTE — TELEPHONE ENCOUNTER
----- Message from Naheed Coker sent at 5/24/2017  8:14 AM CDT -----  Contact: self  Patient is wanting a call back to discuss details of her FMLA paperwork, patient can be reached at [phone]

## 2017-05-24 NOTE — TELEPHONE ENCOUNTER
Pt says that her FMLA request was rejected by her employer because they never received it from our clinic. Told pt that I would look into the matter and call her back. Sent e-mail to [email or web address] inquiring of the status of the pt's FMLA paper work and am awaiting the response.

## 2017-05-25 ENCOUNTER — TELEPHONE (OUTPATIENT)
Dept: OBSTETRICS AND GYNECOLOGY | Facility: CLINIC | Age: 32
End: 2017-05-25

## 2017-05-25 NOTE — TELEPHONE ENCOUNTER
----- Message from Mendez Darden LPN sent at 5/24/2017 11:55 AM CDT -----  Pt says that her FMLA request was denied by work and wants to know why.    Emailed [employer] desk asking if they received a signed FMLA page from Dr. Chavarria.

## 2017-05-26 ENCOUNTER — TELEPHONE (OUTPATIENT)
Dept: OBSTETRICS AND GYNECOLOGY | Facility: CLINIC | Age: 32
End: 2017-05-26

## 2017-05-26 NOTE — TELEPHONE ENCOUNTER
Received pt's [facility] paperwork. Sent it to be processed. Sent [employer] message in forming pt that the paperwork was sent off.

## 2017-05-30 ENCOUNTER — OFFICE VISIT (OUTPATIENT)
Dept: MATERNAL FETAL MEDICINE | Facility: CLINIC | Age: 32
End: 2017-05-30
Attending: OBSTETRICS & GYNECOLOGY
Payer: COMMERCIAL

## 2017-05-30 DIAGNOSIS — Z36.89 ENCOUNTER FOR ULTRASOUND TO CHECK FETAL GROWTH: ICD-10-CM

## 2017-05-30 DIAGNOSIS — Z36.89 ULTRASOUND SCAN TO EVALUATE PLACENTA LOCATION: Primary | ICD-10-CM

## 2017-05-30 PROCEDURE — 76816 OB US FOLLOW-UP PER FETUS: CPT | Mod: S$GLB,,, | Performed by: OBSTETRICS & GYNECOLOGY

## 2017-05-30 PROCEDURE — 76817 TRANSVAGINAL US OBSTETRIC: CPT | Mod: S$GLB,,, | Performed by: OBSTETRICS & GYNECOLOGY

## 2017-05-30 PROCEDURE — 99499 UNLISTED E&M SERVICE: CPT | Mod: S$GLB,,, | Performed by: OBSTETRICS & GYNECOLOGY

## 2017-05-30 NOTE — LETTER
May 30, 2017      Alyssia Chavarria MD  [address]  [address]  [place] [facility] [place] [facility] [address]           [facility]  [address]  [facility] [address]  Phone: [phone]          Patient: Maribel Valderrama   MR Number: [id number]   YOB: 1985   Date of Visit: 5/30/2017       Dear Dr. Alyssia Chavarria:    Thank you for referring Maribel Valderrama to me for evaluation. Attached you will find relevant portions of my assessment and plan of care.    If you have questions, please do not hesitate to call me. I look forward to following Maribel Valderrama along with you.    Sincerely,    Margie Brennan MD    Enclosure  CC:  No Recipients    If you would like to receive this communication electronically, please contact [email or web address] or [phone] to request more information on [employer] Link access.    For providers and/or their staff who would like to refer a patient to [facility], please contact us through our one-stop-shop provider referral line, [facility], at [phone].    If you feel you have received this communication in error or would no longer like to receive these types of communications, please e-mail [email or web address]

## 2017-05-30 NOTE — PROGRESS NOTES
Indication  ========    Evaluation of fetal growth.    History  ======    Medical History  Past surgical history: Previous surgeries performed  Surgery: dermoid removal  Year   Details: from right ovary (April)  Surgery: dermoid removal  Year   Details: from left ovary ()  Previous Outcomes  Preg. no. 1  Outcome: Live birth  Gest. age 39 w + 4 d  Gender: male  Details: 8il2oz,   Preg. no. 2  Outcome: Spontaneous miscarriage  Gest. age 18 w + 0 d  Details: demise   3  Para 1  Matias children born living (T) 1  Matias children born (T) 1  Abortions (A) 1  Matias living children (L) 1  Miscarriages 1    Method  ======    Transabdominal ultrasound examination.    Pregnancy  =========    Matias pregnancy. Number of fetuses: 1.    Dating  ======    LMP on: 2016  Cycle: regular cycle  GA by LMP 24 w + 1 d  BEBE by LMP: 2017  Ultrasound examination on: 2017  GA by U/S based upon: AC, BPD, Femur, HC  GA by U/S 25 w + 0 d  BEBE by U/S: 2017  Assigned: The Best Overall Assessment is based on the LMP.  Assigned GA 24 w + 1 d  Assigned BEBE: 2017    General Evaluation  ==============    Cardiac activity: present.  bpm.  Fetal movements: visualized.  Presentation: cephalic.  Placenta: posterior, marginal previa.  Umbilical cord: 3 vessel cord.  Amniotic fluid: MVP 6.6 cm.    Fetal Biometry  ============    Fetal Biometry  BPD 61.0 mm 24w 6d Hadlock  OFD 80.1 mm 26w 0d Brittany  .9 mm 24w 6d Hadlock  .1 mm 25w 2d Hadlock  Femur 45.0 mm 24w 6d Hadlock  Cerebellum tr 27.5 mm 25w 4d Zarco  CM 6.7 mm   g 81% 24w 6d Hadlock  Calculated by: Hadlock (BPD-HC-AC-FL)  EFW (lb) 1 lb  EFW (oz) 11 oz  Cephalic index 0.76  HC / AC 1.11  FL / BPD 0.74  FL / AC 0.22  MVP 6.6 cm   bpm    Fetal Anatomy  ============    Cranium: normal  Lateral ventricles: normal  Choroid plexus: details  Midline falx: normal  Cavum septi  pellucidi: suboptimal  Cerebellum: normal  Cisterna magna: normal  Rt choroid plexus: normal  Lt choroid plexus: cyst  Lips: normal  Profile: normal  Nose: normal  RVOT: normal  LVOT: normal  4-chamber view: 4-chamber normal, septum previously documented  Situs: normal  Aortic arch: normal  Ductal arch: normal  SVC: suboptimal  IVC: suboptimal  3-vessel view: normal  Diaphragm: documented previously  Cord insertion: normal  Stomach: normal  Kidneys: normal  Bladder: normal  Genitals: normal  Cervical spine: normal  Thoracic spine: normal  Lumbar spine: normal  Sacral spine: normal  Arms: both visualized  Legs: both visualized  Rt foot: normal  Lt foot: normal  Gender: male  Wants to know gender: no    Maternal Structures  ===============    Uterus / Cervix  Cervical length 47.0 mm    Impression  =========    Matias live intrauterine pregnancy.  Overall normal fetal growth.  Known CPC-left cpc remains.  The CSP appears likely be present but it is difficult to obtain an optimal image either transabdominally or transvaginally. The SVC/IVC is still  suboptimal.  The patient did desire a transvaginal ultrasound today to reassess placental location. Unfortunately, a ASPEN contraction prevented us from  obtaining an adequate view of the placental location. Thus, we will continue to treat this as a marginal placenta previa.  The remainder of the limited anatomy appeared normal.    These findings were reviewed with the patient.    Recommendation  ==============    Follow up ultrasound in 2 weeks for suboptimally visualized anatomy and if able to visualize well, the placental location.  Follow up ultrasound at 32 weeks for placental location as per Dr. De Paz's recommendation.

## 2017-06-13 ENCOUNTER — OFFICE VISIT (OUTPATIENT)
Dept: MATERNAL FETAL MEDICINE | Facility: CLINIC | Age: 32
End: 2017-06-13
Payer: COMMERCIAL

## 2017-06-13 DIAGNOSIS — O44.20 MARGINAL PLACENTA PREVIA: ICD-10-CM

## 2017-06-13 DIAGNOSIS — Z36.89 ULTRASOUND SCAN TO EVALUATE PLACENTA LOCATION: ICD-10-CM

## 2017-06-13 PROCEDURE — 76815 OB US LIMITED FETUS(S): CPT | Mod: S$GLB,,, | Performed by: OBSTETRICS & GYNECOLOGY

## 2017-06-13 PROCEDURE — 76817 TRANSVAGINAL US OBSTETRIC: CPT | Mod: S$GLB,,, | Performed by: OBSTETRICS & GYNECOLOGY

## 2017-06-13 PROCEDURE — 99499 UNLISTED E&M SERVICE: CPT | Mod: S$GLB,,, | Performed by: OBSTETRICS & GYNECOLOGY

## 2017-06-13 NOTE — PROGRESS NOTES
Repeat OB Ultrasound:    Indication  ========    finish anatomy/ placenta.    History  ======    Medical History  Past surgical history: Previous surgeries performed  Surgery: dermoid removal  Year   Details: from right ovary (April)  Surgery: dermoid removal  Year   Details: from left ovary ()  Previous Outcomes  Preg. no. 1  Outcome: Live birth  Gest. age 39 w + 4 d  Gender: male  Details: 8il2oz,   Preg. no. 2  Outcome: Spontaneous miscarriage  Gest. age 18 w + 0 d  Details: demise   3  Para 1  Matias children born living (T) 1  Matias children born (T) 1  Abortions (A) 1  Matias living children (L) 1  Miscarriages 1    Method  ======    Transabdominal ultrasound examination. View: Good view.    Pregnancy  =========    Matias pregnancy. Number of fetuses: 1.    Dating  ======    LMP on: 2016  Cycle: regular cycle  GA by LMP 26 w + 1 d  BEBE by LMP: 2017  Assigned: The Best Overall Assessment is based on the LMP.  Assigned GA 26 w + 1 d  Assigned BEBE: 2017    General Evaluation  ===============    Cardiac activity: present.  bpm.  Fetal movements: visualized.  Presentation: breech.  Placenta:  Placental site: posterior, marginal previa.  Umbilical cord: Cord vessels: 3 vessel cord.  Amniotic fluid: Amount of AF: normal amount.    Fetal Biometry  ===========    Fetal Biometry  Calculated by: Hadlock (BPD-HC-AC-FL)   bpm    Fetal Anatomy  ===========    Cavum septi pellucidi: normal  SVC: normal  IVC: normal  Wants to know gender: no  Other: A full anatomic survey has been previously performed.    Impression  =========    A follow up fetal anatomical ultrasound was completed today.  The fetal anatomic survey was completed today, and no fetal structural abnormalities were noted.  The placental location remains a marginal previa. Recommend f/u assessment at 32 weeks to determine placental location.

## 2017-06-19 ENCOUNTER — LAB VISIT (OUTPATIENT)
Dept: LAB | Facility: OTHER | Age: 32
End: 2017-06-19
Attending: OBSTETRICS & GYNECOLOGY
Payer: COMMERCIAL

## 2017-06-19 ENCOUNTER — ROUTINE PRENATAL (OUTPATIENT)
Dept: OBSTETRICS AND GYNECOLOGY | Facility: CLINIC | Age: 32
End: 2017-06-19
Attending: OBSTETRICS & GYNECOLOGY
Payer: COMMERCIAL

## 2017-06-19 VITALS — SYSTOLIC BLOOD PRESSURE: 112 MMHG | DIASTOLIC BLOOD PRESSURE: 60 MMHG | WEIGHT: 172.38 LBS | BODY MASS INDEX: 27 KG/M2

## 2017-06-19 DIAGNOSIS — O44.20 MARGINAL PLACENTA PREVIA: ICD-10-CM

## 2017-06-19 DIAGNOSIS — Z34.80 SUPERVISION OF OTHER NORMAL PREGNANCY: ICD-10-CM

## 2017-06-19 DIAGNOSIS — Z34.80 SUPERVISION OF OTHER NORMAL PREGNANCY: Primary | ICD-10-CM

## 2017-06-19 DIAGNOSIS — O09.292 PRIOR PREGNANCY WITH FETAL DEMISE AND CURRENT PREGNANCY IN SECOND TRIMESTER: ICD-10-CM

## 2017-06-19 DIAGNOSIS — Z23 NEED FOR VACCINATION FOR DTP: ICD-10-CM

## 2017-06-19 LAB
BASOPHILS # BLD AUTO: 0.01 K/UL
BASOPHILS NFR BLD: 0.1 %
DIFFERENTIAL METHOD: ABNORMAL
EOSINOPHIL # BLD AUTO: 0.1 K/UL
EOSINOPHIL NFR BLD: 0.8 %
ERYTHROCYTE [DISTWIDTH] IN BLOOD BY AUTOMATED COUNT: 12.7 %
GLUCOSE SERPL-MCNC: 109 MG/DL
HCT VFR BLD AUTO: 32.9 %
HGB BLD-MCNC: 11.1 G/DL
LYMPHOCYTES # BLD AUTO: 1.3 K/UL
LYMPHOCYTES NFR BLD: 17.4 %
MCH RBC QN AUTO: 33.4 PG
MCHC RBC AUTO-ENTMCNC: 33.7 %
MCV RBC AUTO: 99 FL
MONOCYTES # BLD AUTO: 0.5 K/UL
MONOCYTES NFR BLD: 6.7 %
NEUTROPHILS # BLD AUTO: 5.7 K/UL
NEUTROPHILS NFR BLD: 74 %
PLATELET # BLD AUTO: 294 K/UL
PMV BLD AUTO: 9.4 FL
RBC # BLD AUTO: 3.32 M/UL
WBC # BLD AUTO: 7.65 K/UL

## 2017-06-19 PROCEDURE — 82950 GLUCOSE TEST: CPT

## 2017-06-19 PROCEDURE — 85025 COMPLETE CBC W/AUTO DIFF WBC: CPT

## 2017-06-19 PROCEDURE — 0502F SUBSEQUENT PRENATAL CARE: CPT | Mod: S$GLB,,, | Performed by: OBSTETRICS & GYNECOLOGY

## 2017-06-19 PROCEDURE — 99999 PR PBB SHADOW E&M-EST. PATIENT-LVL II: CPT | Mod: PBBFAC,,, | Performed by: OBSTETRICS & GYNECOLOGY

## 2017-06-19 PROCEDURE — 36415 COLL VENOUS BLD VENIPUNCTURE: CPT

## 2017-06-19 NOTE — LETTER
June 19, 2017    Maribel Valderrama  [address] [address]         [facility] [address]  [address]  [facility] [address]  Phone: [phone]  Fax: [phone] June 19, 2017     Patient: Maribel Valderrama   YOB: 1985   Date of Visit: 6/19/2017       To Whom It May Concern:    It is my medical opinion that Maribel Valderrama should reduce work hours to 2 12-hour shifts for the remainder of the pregnancy, and with no heavy lifting beyond 20 pounds for the remainder of the pregnancy due to pregnancy condition causing severe discomfort with prolonged standing, and history of prior pregnancy loss.    If you have any questions or concerns, please don't hesitate to call.    Sincerely,            Dr. Alyssia Chavarria MD

## 2017-06-19 NOTE — ADDENDUM NOTE
Encounter addended by: Mendez Darden LPN on: 6/19/2017 10:16 AM<BR>    Actions taken: Letter status changed

## 2017-06-19 NOTE — LETTER
June 19, 2017    Maribel Valderrama  [address] [facility] [address]         [facility] [address]  [address]  [facility] [address]  Phone: [phone]  Fax: [phone] June 19, 2017     Patient: Maribel Valderrama   YOB: 1985   Date of Visit: 6/19/2017       To Whom It May Concern:    It is my medical opinion that Maribel Valderrama should not be permitted to do any heavy lifting or pushing beyond 20 pounds. I recommend that the patient works 2 12-hour shifts or, if the patient desires, fultime for the remainder of the pregnancy and adheres to no heavy lifting due to pregnancy condition causing severe discomfort with prolonged standing, and history of prior pregnancy loss.    If you have any questions or concerns, please don't hesitate to call.    Sincerely,              Dr. Alyssia Chavarria MD

## 2017-07-11 ENCOUNTER — ROUTINE PRENATAL (OUTPATIENT)
Dept: OBSTETRICS AND GYNECOLOGY | Facility: CLINIC | Age: 32
End: 2017-07-11
Attending: OBSTETRICS & GYNECOLOGY
Payer: COMMERCIAL

## 2017-07-11 VITALS
BODY MASS INDEX: 27.21 KG/M2 | WEIGHT: 173.75 LBS | SYSTOLIC BLOOD PRESSURE: 116 MMHG | DIASTOLIC BLOOD PRESSURE: 70 MMHG

## 2017-07-11 DIAGNOSIS — O44.20 MARGINAL PLACENTA PREVIA: ICD-10-CM

## 2017-07-11 DIAGNOSIS — O99.013 ANTEPARTUM ANEMIA IN THIRD TRIMESTER: ICD-10-CM

## 2017-07-11 DIAGNOSIS — Z23 NEED FOR VACCINATION FOR DTP: ICD-10-CM

## 2017-07-11 DIAGNOSIS — O09.293 PRIOR PREGNANCY WITH FETAL DEMISE AND CURRENT PREGNANCY IN THIRD TRIMESTER: ICD-10-CM

## 2017-07-11 DIAGNOSIS — Z34.80 SUPERVISION OF OTHER NORMAL PREGNANCY: Primary | ICD-10-CM

## 2017-07-11 PROCEDURE — 0502F SUBSEQUENT PRENATAL CARE: CPT | Mod: S$GLB,,, | Performed by: OBSTETRICS & GYNECOLOGY

## 2017-07-11 PROCEDURE — 99999 PR PBB SHADOW E&M-EST. PATIENT-LVL II: CPT | Mod: PBBFAC,,, | Performed by: OBSTETRICS & GYNECOLOGY

## 2017-07-25 ENCOUNTER — ROUTINE PRENATAL (OUTPATIENT)
Dept: OBSTETRICS AND GYNECOLOGY | Facility: CLINIC | Age: 32
End: 2017-07-25
Attending: OBSTETRICS & GYNECOLOGY
Payer: COMMERCIAL

## 2017-07-25 ENCOUNTER — OFFICE VISIT (OUTPATIENT)
Dept: MATERNAL FETAL MEDICINE | Facility: CLINIC | Age: 32
End: 2017-07-25
Attending: OBSTETRICS & GYNECOLOGY
Payer: COMMERCIAL

## 2017-07-25 VITALS
BODY MASS INDEX: 27.55 KG/M2 | DIASTOLIC BLOOD PRESSURE: 70 MMHG | WEIGHT: 175.94 LBS | SYSTOLIC BLOOD PRESSURE: 114 MMHG

## 2017-07-25 DIAGNOSIS — O99.013 ANTEPARTUM ANEMIA IN THIRD TRIMESTER: ICD-10-CM

## 2017-07-25 DIAGNOSIS — Z34.80 SUPERVISION OF OTHER NORMAL PREGNANCY: Primary | ICD-10-CM

## 2017-07-25 DIAGNOSIS — Z23 NEED FOR VACCINATION FOR DTP: ICD-10-CM

## 2017-07-25 DIAGNOSIS — O44.20 MARGINAL PLACENTA PREVIA: ICD-10-CM

## 2017-07-25 DIAGNOSIS — O09.293 PRIOR PREGNANCY WITH FETAL DEMISE AND CURRENT PREGNANCY IN THIRD TRIMESTER: ICD-10-CM

## 2017-07-25 DIAGNOSIS — Z36.89 ENCOUNTER FOR ULTRASOUND TO CHECK FETAL GROWTH: ICD-10-CM

## 2017-07-25 PROCEDURE — 0502F SUBSEQUENT PRENATAL CARE: CPT | Mod: S$GLB,,, | Performed by: OBSTETRICS & GYNECOLOGY

## 2017-07-25 PROCEDURE — 76816 OB US FOLLOW-UP PER FETUS: CPT | Mod: S$GLB,,, | Performed by: OBSTETRICS & GYNECOLOGY

## 2017-07-25 PROCEDURE — 99999 PR PBB SHADOW E&M-EST. PATIENT-LVL I: CPT | Mod: PBBFAC,,,

## 2017-07-25 PROCEDURE — 90471 IMMUNIZATION ADMIN: CPT | Mod: S$GLB,,, | Performed by: OBSTETRICS & GYNECOLOGY

## 2017-07-25 PROCEDURE — 99999 PR PBB SHADOW E&M-EST. PATIENT-LVL II: CPT | Mod: PBBFAC,,, | Performed by: OBSTETRICS & GYNECOLOGY

## 2017-07-25 PROCEDURE — 90715 TDAP VACCINE 7 YRS/> IM: CPT | Mod: S$GLB,,, | Performed by: OBSTETRICS & GYNECOLOGY

## 2017-07-25 PROCEDURE — 76817 TRANSVAGINAL US OBSTETRIC: CPT | Mod: S$GLB,,, | Performed by: OBSTETRICS & GYNECOLOGY

## 2017-07-25 PROCEDURE — 99499 UNLISTED E&M SERVICE: CPT | Mod: S$GLB,,, | Performed by: OBSTETRICS & GYNECOLOGY

## 2017-07-25 NOTE — LETTER
July 25, 2017      Alyssia Chavarria MD  [address]  [address]  [place] [address]           [facility]  [address]  [facility] [address]  Phone: [phone]          Patient: Maribel Valderrama   MR Number: [id number]   YOB: 1985   Date of Visit: 7/25/2017       Dear Dr. Alyssia Chavarria:    Thank you for referring Maribel Valderrama to me for evaluation. Attached you will find relevant portions of my assessment and plan of care.    If you have questions, please do not hesitate to call me. I look forward to following Maribel Valderrama along with you.    Sincerely,    Bridgett Sanchez MD    Enclosure  CC:  No Recipients    If you would like to receive this communication electronically, please contact [email or web address] or [phone] to request more information on [email or web address] Link access.    For providers and/or their staff who would like to refer a patient to [facility], please contact us through our one-stop-shop provider referral line, [facility], at [phone].    If you feel you have received this communication in error or would no longer like to receive these types of communications, please e-mail [email or web address]

## 2017-07-25 NOTE — PROGRESS NOTES
Pregnancy at 32w1d with pubic symphisis discomfort. Advised on support belt.  Reinforced kick counts, labor precautions.    Male infant, plans circ  Dr. Horner for pediatrician.  Plans Epidural for labor/delivery  Plans Breastfeeding.

## 2017-07-25 NOTE — PROGRESS NOTES
OB Ultrasound Report (see PDF version under imaging tab)    Indication  ========    Evaluation of fetal growth and reassess placental location.    History  ======    Medical History  Past surgical history: Previous surgeries performed  Surgery: dermoid removal  Year   Details: from right ovary (April)  Surgery: dermoid removal  Year   Details: from left ovary ()  Previous Outcomes  Preg. no. 1  Outcome: Live birth  Gest. age 39 w + 4 d  Gender: male  Details: 8il2oz,   Preg. no. 2  Outcome: Spontaneous miscarriage  Gest. age 18 w + 0 d  Details: demise   3  Para 1  Matias children born living (T) 1  Matias children born (T) 1  Abortions (A) 1  Matias living children (L) 1  Miscarriages 1    Method  ======    Transabdominal and transvaginal ultrasound examination. View: Sufficient.    Pregnancy  =========    Matias pregnancy. Number of fetuses: 1.    Dating  ======    LMP on: 2016  Cycle: regular cycle  GA by LMP 32 w + 1 d  BEBE by LMP: 2017  Ultrasound examination on: 2017  GA by U/S based upon: AC, BPD, Femur, HC  GA by U/S 33 w + 2 d  BEBE by U/S: 9/10/2017  Assigned: The Best Overall Assessment is based on the LMP.  Assigned GA 32 w + 1 d  Assigned BEBE: 2017    General Evaluation  ===============    Cardiac activity: present.  bpm.  Fetal movements: visualized.  Presentation: cephalic.  Placenta:  Placental site: posterior.  Umbilical cord: Cord vessels: 3 vessel cord.  Amniotic fluid: MVP 7.1 cm.    Fetal Biometry  ===========    Fetal Biometry  BPD 83.8 mm 33w 5d Hadlock  .2 mm 35w 0d Brittany  .6 mm 34w 2d Hadlock  .8 mm 32w 6d Hadlock  Femur 62.8 mm 32w 4d Hadlock  Cerebellum tr 40.8 mm 33w 6d Zarco  CM 6.2 mm  EFW 2,084 g 39% Mj  Calculated by: Hadlock (BPD-HC-AC-FL)  EFW (lb) 4 lb  EFW (oz) 10 oz  Cephalic index 0.79  HC / AC 1.07  FL / BPD 0.75  FL / AC 0.22  MVP 7.1 cm   bpm  Head / Face / Neck   7.0  mm    Fetal Anatomy  ===========    Cranium: normal  Posterior fossa: normal  4-chamber view: normal  Stomach: normal  Kidneys: normal  Bladder: normal  Arms: both visualized  Legs: both visualized  Wants to know gender: no  Other: A full anatomy survey previously performed.    Impression  =========    Fetal size is AGA with the EFW at the 39th percentile.  Normal repeat limited fetal anatomic survey. AFV is normal.  Both TA and TV scanning confirm resolution of the marginal placenta previa. Color flow imaging on the TV scan is not suggestive of  vasa previa.  Follow-up ultrasound as clinically indicated.

## 2017-07-28 ENCOUNTER — TELEPHONE (OUTPATIENT)
Dept: OBSTETRICS AND GYNECOLOGY | Facility: CLINIC | Age: 32
End: 2017-07-28

## 2017-07-28 NOTE — TELEPHONE ENCOUNTER
Patient called about her FMLA. Patient notified that there is a document stamped as faxed on June 7, 2017. Patient stated that she is looking for the documents that she sent in on July 13 or 14, 2017. Patient given the phone number to contact the disability department.

## 2017-07-28 NOTE — TELEPHONE ENCOUNTER
----- Message from Sandoval Ayala sent at 7/28/2017 12:41 PM CDT -----  Contact: Pt  X_ 1st Request  _ 2nd Request  _ 3rd Request    Who: JOLENE GUTIERREZ [[id number]]    Why: Patient would like to speak with staff in regards to her [place] paperwork    What Number to Call Back: [phone]    When to Expect a call back: (Before the end of the day)  -- if call after 3:00 call back will be tomorrow.

## 2017-08-08 ENCOUNTER — ROUTINE PRENATAL (OUTPATIENT)
Dept: OBSTETRICS AND GYNECOLOGY | Facility: CLINIC | Age: 32
End: 2017-08-08
Attending: OBSTETRICS & GYNECOLOGY
Payer: COMMERCIAL

## 2017-08-08 VITALS — DIASTOLIC BLOOD PRESSURE: 74 MMHG | WEIGHT: 181 LBS | SYSTOLIC BLOOD PRESSURE: 112 MMHG | BODY MASS INDEX: 28.35 KG/M2

## 2017-08-08 DIAGNOSIS — O99.013 ANTEPARTUM ANEMIA IN THIRD TRIMESTER: ICD-10-CM

## 2017-08-08 DIAGNOSIS — Z34.80 SUPERVISION OF OTHER NORMAL PREGNANCY: Primary | ICD-10-CM

## 2017-08-08 DIAGNOSIS — O09.293 PRIOR PREGNANCY WITH FETAL DEMISE AND CURRENT PREGNANCY IN THIRD TRIMESTER: ICD-10-CM

## 2017-08-08 PROCEDURE — 99999 PR PBB SHADOW E&M-EST. PATIENT-LVL I: CPT | Mod: PBBFAC,,, | Performed by: OBSTETRICS & GYNECOLOGY

## 2017-08-08 PROCEDURE — 0502F SUBSEQUENT PRENATAL CARE: CPT | Mod: S$GLB,,, | Performed by: OBSTETRICS & GYNECOLOGY

## 2017-08-15 ENCOUNTER — ROUTINE PRENATAL (OUTPATIENT)
Dept: OBSTETRICS AND GYNECOLOGY | Facility: CLINIC | Age: 32
End: 2017-08-15
Attending: OBSTETRICS & GYNECOLOGY
Payer: COMMERCIAL

## 2017-08-15 ENCOUNTER — LAB VISIT (OUTPATIENT)
Dept: LAB | Facility: OTHER | Age: 32
End: 2017-08-15
Attending: OBSTETRICS & GYNECOLOGY
Payer: COMMERCIAL

## 2017-08-15 VITALS
BODY MASS INDEX: 28.18 KG/M2 | WEIGHT: 179.88 LBS | SYSTOLIC BLOOD PRESSURE: 118 MMHG | DIASTOLIC BLOOD PRESSURE: 70 MMHG

## 2017-08-15 DIAGNOSIS — Z36.85 ANTENATAL SCREENING FOR STREPTOCOCCUS B: ICD-10-CM

## 2017-08-15 DIAGNOSIS — Z34.80 SUPERVISION OF OTHER NORMAL PREGNANCY: ICD-10-CM

## 2017-08-15 DIAGNOSIS — O09.293 PRIOR PREGNANCY WITH FETAL DEMISE AND CURRENT PREGNANCY IN THIRD TRIMESTER: ICD-10-CM

## 2017-08-15 DIAGNOSIS — O99.013 ANTEPARTUM ANEMIA IN THIRD TRIMESTER: ICD-10-CM

## 2017-08-15 DIAGNOSIS — Z34.80 SUPERVISION OF OTHER NORMAL PREGNANCY: Primary | ICD-10-CM

## 2017-08-15 PROBLEM — O44.20 MARGINAL PLACENTA PREVIA: Status: RESOLVED | Noted: 2017-05-23 | Resolved: 2017-08-15

## 2017-08-15 LAB
BASOPHILS # BLD AUTO: 0.01 K/UL
BASOPHILS NFR BLD: 0.1 %
DIFFERENTIAL METHOD: ABNORMAL
EOSINOPHIL # BLD AUTO: 0.1 K/UL
EOSINOPHIL NFR BLD: 0.7 %
ERYTHROCYTE [DISTWIDTH] IN BLOOD BY AUTOMATED COUNT: 12.6 %
HCT VFR BLD AUTO: 33.1 %
HGB BLD-MCNC: 11.3 G/DL
HIV 1+2 AB+HIV1 P24 AG SERPL QL IA: NEGATIVE
LYMPHOCYTES # BLD AUTO: 1.3 K/UL
LYMPHOCYTES NFR BLD: 18 %
MCH RBC QN AUTO: 33.5 PG
MCHC RBC AUTO-ENTMCNC: 34.1 G/DL
MCV RBC AUTO: 98 FL
MONOCYTES # BLD AUTO: 0.5 K/UL
MONOCYTES NFR BLD: 7.2 %
NEUTROPHILS # BLD AUTO: 5.1 K/UL
NEUTROPHILS NFR BLD: 72.9 %
PLATELET # BLD AUTO: 283 K/UL
PMV BLD AUTO: 9.7 FL
RBC # BLD AUTO: 3.37 M/UL
RPR SER QL: NORMAL
WBC # BLD AUTO: 7.05 K/UL

## 2017-08-15 PROCEDURE — 36415 COLL VENOUS BLD VENIPUNCTURE: CPT

## 2017-08-15 PROCEDURE — 86592 SYPHILIS TEST NON-TREP QUAL: CPT

## 2017-08-15 PROCEDURE — 87086 URINE CULTURE/COLONY COUNT: CPT

## 2017-08-15 PROCEDURE — 86703 HIV-1/HIV-2 1 RESULT ANTBDY: CPT

## 2017-08-15 PROCEDURE — 0502F SUBSEQUENT PRENATAL CARE: CPT | Mod: S$GLB,,, | Performed by: OBSTETRICS & GYNECOLOGY

## 2017-08-15 PROCEDURE — 99999 PR PBB SHADOW E&M-EST. PATIENT-LVL II: CPT | Mod: PBBFAC,,, | Performed by: OBSTETRICS & GYNECOLOGY

## 2017-08-15 PROCEDURE — 85025 COMPLETE CBC W/AUTO DIFF WBC: CPT

## 2017-08-15 PROCEDURE — 87081 CULTURE SCREEN ONLY: CPT

## 2017-08-15 NOTE — PROGRESS NOTES
Pregnancy at 35w1d with reports of good FM. Cultures today for Grp B strep. Consents next for delivery and transfusion.

## 2017-08-16 LAB
BACTERIA UR CULT: NORMAL
BACTERIA UR CULT: NORMAL

## 2017-08-17 LAB — BACTERIA SPEC AEROBE CULT: NORMAL

## 2017-08-22 ENCOUNTER — ROUTINE PRENATAL (OUTPATIENT)
Dept: OBSTETRICS AND GYNECOLOGY | Facility: CLINIC | Age: 32
End: 2017-08-22
Attending: OBSTETRICS & GYNECOLOGY
Payer: COMMERCIAL

## 2017-08-22 VITALS
WEIGHT: 182.13 LBS | DIASTOLIC BLOOD PRESSURE: 64 MMHG | BODY MASS INDEX: 28.52 KG/M2 | SYSTOLIC BLOOD PRESSURE: 122 MMHG

## 2017-08-22 DIAGNOSIS — O09.293 PRIOR PREGNANCY WITH FETAL DEMISE AND CURRENT PREGNANCY IN THIRD TRIMESTER: ICD-10-CM

## 2017-08-22 DIAGNOSIS — Z34.80 SUPERVISION OF OTHER NORMAL PREGNANCY: Primary | ICD-10-CM

## 2017-08-22 DIAGNOSIS — O99.013 ANTEPARTUM ANEMIA IN THIRD TRIMESTER: ICD-10-CM

## 2017-08-22 PROCEDURE — 99999 PR PBB SHADOW E&M-EST. PATIENT-LVL II: CPT | Mod: PBBFAC,,, | Performed by: OBSTETRICS & GYNECOLOGY

## 2017-08-22 PROCEDURE — 0502F SUBSEQUENT PRENATAL CARE: CPT | Mod: S$GLB,,, | Performed by: OBSTETRICS & GYNECOLOGY

## 2017-08-22 NOTE — PATIENT INSTRUCTIONS
Pregnancy and Childbirth: What to Bring to the Hospital    Youre likely feeling anxious as your childs birth approaches. This is normal. To give yourself some peace of mind, pack a bag ahead of time. Do this about 1 month ahead of your estimated delivery date. Here is a list of things to remember:  · Personal care items, like a toothbrush, hair brush, lip balm, lotion, and shampoo  · Eyeglasses (if you wear them)  · Nightgown (if you plan to breastfeed, pack 1 that allows for nursing)  · Nursing bra if you plan to breastfeed  · Bathrobe and slippers  · Many hospitals provide maternity underwear, but you may want to bring underwear that can be soiled because you will have bleeding after delivery  · Comfortable clothes for you to wear home, like sweat pants, yoga pants, or other stretchable clothes, because your prepregnancy clothes may not fit after delivery of your baby  · Clothes for your baby to wear home  · Personal music player and headphones  · Camera with new batteries or   · Coins for vending machines  · Telephone numbers of people to call after the birth  · Cell phone and   · Insurance information and any other paperwork needed for your hospital stay  · A list of baby names you are considering  · An infant, rear-facing car seat for bringing home your baby (this is required by law)  Add anything else that you dont want to forget:  _____________________________________  _____________________________________  _____________________________________  _____________________________________  _____________________________________  _____________________________________  _____________________________________  Date Last Reviewed: 8/7/2015  © 9911-9219 [employer] [address] [place] [address]. All rights reserved. This information is not intended as a substitute for professional medical care. Always follow your healthcare professional's instructions.

## 2017-08-22 NOTE — PROGRESS NOTES
Pregnancy at 36w1d with reports of good FM. Denies regular contrs, LOF or bleeding. Reinforced kick counts. COnsents today for Delivery and transfusion.

## 2017-08-29 ENCOUNTER — ROUTINE PRENATAL (OUTPATIENT)
Dept: OBSTETRICS AND GYNECOLOGY | Facility: CLINIC | Age: 32
End: 2017-08-29
Attending: OBSTETRICS & GYNECOLOGY
Payer: COMMERCIAL

## 2017-08-29 VITALS
WEIGHT: 177.94 LBS | DIASTOLIC BLOOD PRESSURE: 58 MMHG | BODY MASS INDEX: 27.86 KG/M2 | SYSTOLIC BLOOD PRESSURE: 108 MMHG

## 2017-08-29 DIAGNOSIS — O09.293 PRIOR PREGNANCY WITH FETAL DEMISE AND CURRENT PREGNANCY IN THIRD TRIMESTER: ICD-10-CM

## 2017-08-29 DIAGNOSIS — O99.013 ANTEPARTUM ANEMIA IN THIRD TRIMESTER: ICD-10-CM

## 2017-08-29 DIAGNOSIS — Z34.80 SUPERVISION OF OTHER NORMAL PREGNANCY: Primary | ICD-10-CM

## 2017-08-29 PROCEDURE — 99999 PR PBB SHADOW E&M-EST. PATIENT-LVL I: CPT | Mod: PBBFAC,,, | Performed by: OBSTETRICS & GYNECOLOGY

## 2017-08-29 PROCEDURE — 0502F SUBSEQUENT PRENATAL CARE: CPT | Mod: S$GLB,,, | Performed by: OBSTETRICS & GYNECOLOGY

## 2017-09-05 ENCOUNTER — ROUTINE PRENATAL (OUTPATIENT)
Dept: OBSTETRICS AND GYNECOLOGY | Facility: CLINIC | Age: 32
End: 2017-09-05
Attending: OBSTETRICS & GYNECOLOGY
Payer: COMMERCIAL

## 2017-09-05 VITALS
DIASTOLIC BLOOD PRESSURE: 78 MMHG | WEIGHT: 187.63 LBS | BODY MASS INDEX: 29.38 KG/M2 | SYSTOLIC BLOOD PRESSURE: 120 MMHG

## 2017-09-05 DIAGNOSIS — O09.293 PRIOR PREGNANCY WITH FETAL DEMISE AND CURRENT PREGNANCY IN THIRD TRIMESTER: ICD-10-CM

## 2017-09-05 DIAGNOSIS — Z34.80 SUPERVISION OF OTHER NORMAL PREGNANCY: Primary | ICD-10-CM

## 2017-09-05 PROCEDURE — 0502F SUBSEQUENT PRENATAL CARE: CPT | Mod: S$GLB,,, | Performed by: OBSTETRICS & GYNECOLOGY

## 2017-09-05 PROCEDURE — 99999 PR PBB SHADOW E&M-EST. PATIENT-LVL II: CPT | Mod: PBBFAC,,, | Performed by: OBSTETRICS & GYNECOLOGY

## 2017-09-05 NOTE — PATIENT INSTRUCTIONS
Kick Counts    Its normal to worry about your babys health. One way you can know your babys doing well is to record the babys movements once a day. This is called a kick count. Remember to take your kick count records to all your appointments with your healthcare provider.  How to count kicks  Here are tips for counting kicks:  · Choose a time when the baby is active, such as after a meal.   · Sit comfortably or lie on your side.   · The first time the baby moves, write down the time.   · Count each movement until the baby has moved 10 times. This can take from 20 minutes to 2 hours.   · Try to do it at the same time each day.  When to call your healthcare provider  Call your healthcare provider right away if you notice any of the following:  · Your baby moves fewer than 10 times in 2 hours while youre doing kick counts.  · Your baby moves much less often than on the days before.  · You have not felt your baby move all day.  Date Last Reviewed: 8/5/2015 © 2000-2016 [employer], [employer]. [address] [place] [address], [place] [address]. All rights reserved. This information is not intended as a substitute for professional medical care. Always follow your healthcare professional's instructions.

## 2017-09-13 ENCOUNTER — ANESTHESIA EVENT (OUTPATIENT)
Dept: OBSTETRICS AND GYNECOLOGY | Facility: OTHER | Age: 32
End: 2017-09-13
Payer: COMMERCIAL

## 2017-09-13 ENCOUNTER — ANESTHESIA (OUTPATIENT)
Dept: OBSTETRICS AND GYNECOLOGY | Facility: OTHER | Age: 32
End: 2017-09-13
Payer: COMMERCIAL

## 2017-09-13 ENCOUNTER — HOSPITAL ENCOUNTER (INPATIENT)
Facility: OTHER | Age: 32
LOS: 2 days | Discharge: HOME | End: 2017-09-15
Attending: OBSTETRICS & GYNECOLOGY | Admitting: OBSTETRICS & GYNECOLOGY
Payer: COMMERCIAL

## 2017-09-13 DIAGNOSIS — Z3A.39 39 WEEKS GESTATION OF PREGNANCY: ICD-10-CM

## 2017-09-13 DIAGNOSIS — O42.90 PROM (PREMATURE RUPTURE OF MEMBRANES): ICD-10-CM

## 2017-09-13 DIAGNOSIS — O42.90 AMNIOTIC FLUID LEAKING: ICD-10-CM

## 2017-09-13 DIAGNOSIS — O42.92 FULL-TERM PREMATURE RUPTURE OF MEMBRANES, UNSPECIFIED DURATION TO ONSET OF LABOR: ICD-10-CM

## 2017-09-13 LAB
ABO + RH BLD: NORMAL
ALLENS TEST: ABNORMAL
BASOPHILS # BLD AUTO: 0 K/UL
BASOPHILS NFR BLD: 0 %
BLD GP AB SCN CELLS X3 SERPL QL: NORMAL
DIFFERENTIAL METHOD: ABNORMAL
EOSINOPHIL # BLD AUTO: 0 K/UL
EOSINOPHIL NFR BLD: 0.5 %
ERYTHROCYTE [DISTWIDTH] IN BLOOD BY AUTOMATED COUNT: 13.1 %
HCO3 UR-SCNC: 25.1 MMOL/L (ref 24–28)
HCT VFR BLD AUTO: 38.3 %
HGB BLD-MCNC: 13.1 G/DL
LYMPHOCYTES # BLD AUTO: 1.4 K/UL
LYMPHOCYTES NFR BLD: 18.8 %
MCH RBC QN AUTO: 33.8 PG
MCHC RBC AUTO-ENTMCNC: 34.2 G/DL
MCV RBC AUTO: 99 FL
MONOCYTES # BLD AUTO: 0.6 K/UL
MONOCYTES NFR BLD: 8.6 %
NEUTROPHILS # BLD AUTO: 5.2 K/UL
NEUTROPHILS NFR BLD: 71.1 %
PCO2 BLDA: 52.4 MMHG (ref 35–45)
PH SMN: 7.29 [PH] (ref 7.35–7.45)
PLATELET # BLD AUTO: 258 K/UL
PMV BLD AUTO: 10.1 FL
PO2 BLDA: 15 MMHG (ref 80–100)
POC BE: -1 MMOL/L
POC SATURATED O2: 16 % (ref 95–100)
RBC # BLD AUTO: 3.88 M/UL
SAMPLE: ABNORMAL
SITE: ABNORMAL
WBC # BLD AUTO: 7.34 K/UL

## 2017-09-13 PROCEDURE — 51702 INSERT TEMP BLADDER CATH: CPT

## 2017-09-13 PROCEDURE — 72200005 HC VAGINAL DELIVERY LEVEL II

## 2017-09-13 PROCEDURE — 59400 OBSTETRICAL CARE: CPT | Mod: GB,,, | Performed by: OBSTETRICS & GYNECOLOGY

## 2017-09-13 PROCEDURE — 4A0HXCZ MEASUREMENT OF PRODUCTS OF CONCEPTION, CARDIAC RATE, EXTERNAL APPROACH: ICD-10-PCS | Performed by: OBSTETRICS & GYNECOLOGY

## 2017-09-13 PROCEDURE — 59025 FETAL NON-STRESS TEST: CPT

## 2017-09-13 PROCEDURE — 99283 EMERGENCY DEPT VISIT LOW MDM: CPT | Mod: 25,,, | Performed by: OBSTETRICS & GYNECOLOGY

## 2017-09-13 PROCEDURE — 27800517 HC TRAY,EPIDURAL-CONTINUOUS: Performed by: ANESTHESIOLOGY

## 2017-09-13 PROCEDURE — 62326 NJX INTERLAMINAR LMBR/SAC: CPT | Performed by: ANESTHESIOLOGY

## 2017-09-13 PROCEDURE — 72100002 HC LABOR CARE, 1ST 8 HOURS

## 2017-09-13 PROCEDURE — 82803 BLOOD GASES ANY COMBINATION: CPT

## 2017-09-13 PROCEDURE — 27200710 HC EPIDURAL INFUSION PUMP SET: Performed by: ANESTHESIOLOGY

## 2017-09-13 PROCEDURE — 59025 FETAL NON-STRESS TEST: CPT | Mod: 26,,, | Performed by: OBSTETRICS & GYNECOLOGY

## 2017-09-13 PROCEDURE — 63600175 PHARM REV CODE 636 W HCPCS: Performed by: ANESTHESIOLOGY

## 2017-09-13 PROCEDURE — 59400 OBSTETRICAL CARE: CPT | Mod: QK,,, | Performed by: ANESTHESIOLOGY

## 2017-09-13 PROCEDURE — 11000001 HC ACUTE MED/SURG PRIVATE ROOM

## 2017-09-13 PROCEDURE — 99285 EMERGENCY DEPT VISIT HI MDM: CPT | Mod: 25

## 2017-09-13 PROCEDURE — 25000003 PHARM REV CODE 250: Performed by: STUDENT IN AN ORGANIZED HEALTH CARE EDUCATION/TRAINING PROGRAM

## 2017-09-13 PROCEDURE — 86850 RBC ANTIBODY SCREEN: CPT

## 2017-09-13 PROCEDURE — 86900 BLOOD TYPING SEROLOGIC ABO: CPT

## 2017-09-13 PROCEDURE — 25000003 PHARM REV CODE 250: Performed by: ANESTHESIOLOGY

## 2017-09-13 PROCEDURE — 85025 COMPLETE CBC W/AUTO DIFF WBC: CPT

## 2017-09-13 PROCEDURE — 99900035 HC TECH TIME PER 15 MIN (STAT)

## 2017-09-13 RX ORDER — SODIUM CHLORIDE, SODIUM LACTATE, POTASSIUM CHLORIDE, CALCIUM CHLORIDE 600; 310; 30; 20 MG/100ML; MG/100ML; MG/100ML; MG/100ML
INJECTION, SOLUTION INTRAVENOUS CONTINUOUS
Status: DISCONTINUED | OUTPATIENT
Start: 2017-09-13 | End: 2017-09-14

## 2017-09-13 RX ORDER — METHYLERGONOVINE MALEATE 0.2 MG/ML
INJECTION INTRAVENOUS
Status: DISCONTINUED
Start: 2017-09-13 | End: 2017-09-13 | Stop reason: WASHOUT

## 2017-09-13 RX ORDER — OXYTOCIN/RINGER'S LACTATE 20/1000 ML
2 PLASTIC BAG, INJECTION (ML) INTRAVENOUS CONTINUOUS
Status: DISCONTINUED | OUTPATIENT
Start: 2017-09-13 | End: 2017-09-14

## 2017-09-13 RX ORDER — SODIUM CITRATE AND CITRIC ACID MONOHYDRATE 334; 500 MG/5ML; MG/5ML
30 SOLUTION ORAL ONCE
Status: DISCONTINUED | OUTPATIENT
Start: 2017-09-13 | End: 2017-09-14

## 2017-09-13 RX ORDER — FENTANYL/BUPIVACAINE/NS/PF 2MCG/ML-.1
PLASTIC BAG, INJECTION (ML) INJECTION CONTINUOUS PRN
Status: DISCONTINUED | OUTPATIENT
Start: 2017-09-13 | End: 2017-09-13

## 2017-09-13 RX ORDER — CARBOPROST TROMETHAMINE 250 UG/ML
INJECTION, SOLUTION INTRAMUSCULAR
Status: DISCONTINUED
Start: 2017-09-13 | End: 2017-09-13 | Stop reason: WASHOUT

## 2017-09-13 RX ORDER — FENTANYL/BUPIVACAINE/NS/PF 2MCG/ML-.1
PLASTIC BAG, INJECTION (ML) INJECTION CONTINUOUS
Status: DISCONTINUED | OUTPATIENT
Start: 2017-09-13 | End: 2017-09-14

## 2017-09-13 RX ORDER — OXYTOCIN/RINGER'S LACTATE 20/1000 ML
41.65 PLASTIC BAG, INJECTION (ML) INTRAVENOUS CONTINUOUS
Status: DISCONTINUED | OUTPATIENT
Start: 2017-09-13 | End: 2017-09-14

## 2017-09-13 RX ORDER — ONDANSETRON 8 MG/1
8 TABLET, ORALLY DISINTEGRATING ORAL EVERY 8 HOURS PRN
Status: DISCONTINUED | OUTPATIENT
Start: 2017-09-13 | End: 2017-09-14

## 2017-09-13 RX ORDER — FENTANYL CITRATE 50 UG/ML
INJECTION, SOLUTION INTRAMUSCULAR; INTRAVENOUS
Status: DISCONTINUED | OUTPATIENT
Start: 2017-09-13 | End: 2017-09-13

## 2017-09-13 RX ORDER — METHYLERGONOVINE MALEATE 0.2 MG/ML
200 INJECTION INTRAVENOUS
Status: DISCONTINUED | OUTPATIENT
Start: 2017-09-13 | End: 2017-09-14

## 2017-09-13 RX ORDER — MISOPROSTOL 200 UG/1
TABLET ORAL
Status: DISCONTINUED
Start: 2017-09-13 | End: 2017-09-13 | Stop reason: WASHOUT

## 2017-09-13 RX ORDER — LIDOCAINE HYDROCHLORIDE AND EPINEPHRINE 15; 5 MG/ML; UG/ML
INJECTION, SOLUTION EPIDURAL
Status: DISCONTINUED | OUTPATIENT
Start: 2017-09-13 | End: 2017-09-13

## 2017-09-13 RX ORDER — MISOPROSTOL 200 UG/1
600 TABLET ORAL
Status: DISCONTINUED | OUTPATIENT
Start: 2017-09-13 | End: 2017-09-14

## 2017-09-13 RX ORDER — CARBOPROST TROMETHAMINE 250 UG/ML
250 INJECTION, SOLUTION INTRAMUSCULAR
Status: DISCONTINUED | OUTPATIENT
Start: 2017-09-13 | End: 2017-09-14

## 2017-09-13 RX ORDER — OXYTOCIN 10 [USP'U]/ML
INJECTION, SOLUTION INTRAMUSCULAR; INTRAVENOUS
Status: DISCONTINUED
Start: 2017-09-13 | End: 2017-09-13 | Stop reason: WASHOUT

## 2017-09-13 RX ORDER — BUPIVACAINE HYDROCHLORIDE 2.5 MG/ML
INJECTION, SOLUTION EPIDURAL; INFILTRATION; INTRACAUDAL
Status: DISCONTINUED | OUTPATIENT
Start: 2017-09-13 | End: 2017-09-13

## 2017-09-13 RX ORDER — FAMOTIDINE 10 MG/ML
20 INJECTION INTRAVENOUS ONCE
Status: DISCONTINUED | OUTPATIENT
Start: 2017-09-13 | End: 2017-09-14

## 2017-09-13 RX ADMIN — SODIUM CHLORIDE, SODIUM LACTATE, POTASSIUM CHLORIDE, AND CALCIUM CHLORIDE 1000 ML: .6; .31; .03; .02 INJECTION, SOLUTION INTRAVENOUS at 03:09

## 2017-09-13 RX ADMIN — LIDOCAINE HYDROCHLORIDE,EPINEPHRINE BITARTRATE 3 ML: 15; .005 INJECTION, SOLUTION EPIDURAL; INFILTRATION; INTRACAUDAL; PERINEURAL at 04:09

## 2017-09-13 RX ADMIN — Medication 10 ML/HR: at 04:09

## 2017-09-13 RX ADMIN — SODIUM CHLORIDE, SODIUM LACTATE, POTASSIUM CHLORIDE, AND CALCIUM CHLORIDE: 600; 310; 30; 20 INJECTION, SOLUTION INTRAVENOUS at 08:09

## 2017-09-13 RX ADMIN — BUPIVACAINE HYDROCHLORIDE 1 ML: 2.5 INJECTION, SOLUTION EPIDURAL; INFILTRATION; INTRACAUDAL; PERINEURAL at 04:09

## 2017-09-13 RX ADMIN — MISOPROSTOL 50 MCG: 100 TABLET ORAL at 11:09

## 2017-09-13 RX ADMIN — Medication 2 MILLI-UNITS/MIN: at 05:09

## 2017-09-13 RX ADMIN — SODIUM CHLORIDE, SODIUM LACTATE, POTASSIUM CHLORIDE, AND CALCIUM CHLORIDE: 600; 310; 30; 20 INJECTION, SOLUTION INTRAVENOUS at 11:09

## 2017-09-13 RX ADMIN — Medication 333 MILLI-UNITS/MIN: at 10:09

## 2017-09-13 RX ADMIN — FENTANYL CITRATE 10 MCG: 50 INJECTION, SOLUTION INTRAMUSCULAR; INTRAVENOUS at 04:09

## 2017-09-13 NOTE — SUBJECTIVE & OBJECTIVE
Obstetric History       T1      L1     SAB0   TAB0   Ectopic0   Multiple0   Live Births1       # Outcome Date GA Lbr Zeus/2nd Weight Sex Delivery Anes PTL Lv   3 Current            2 SAB 02// 18w5d 20:00 / 00:13 0.28 kg (9.9 oz) M Vag-Spont OTHER Y FD      Apgar1:  0                Apgar5: 0   1 Term 09/25/10 39w4d  3.697 kg (8 lb 2.4 oz) M Vag-Spont EPI  TONYA      Name: Elton        No past medical history on file.  Past Surgical History:   Procedure Laterality Date    Da Kory-assisted resection of complex left ovarian cyst x2 Left 2016    DONNA done at time of surgery -Dermoid and Hemorrhagic ovarian cyst    Ovarian Teratoma Removal Right 2010    15 weeks gestation, DERMOID RIGHT        PTA Medications   Medication Sig    docusate sodium (COLACE) 100 MG capsule Take 1 capsule (100 mg total) by mouth daily as needed for Constipation.    PRENATAL VIT W-CA,FE,FA,<1 MG, (PRENATAL VITAMIN ORAL) Take by mouth.       Review of patient's allergies indicates:  No Known Allergies     Family History     Problem Relation (Age of Onset)    Breast cancer Other    Cancer Other    Diabetes Maternal Uncle    Hypertension Mother, Father        Social History Main Topics    Smoking status: Never Smoker    Smokeless tobacco: Never Used    Alcohol use No    Drug use: No    Sexual activity: Yes     Partners: Male     Birth control/ protection: None      Comment:  to roxanne since      Review of Systems   Constitutional: Negative for chills and fever.   Respiratory: Negative for shortness of breath.    Cardiovascular: Negative for chest pain.   Gastrointestinal: Negative for abdominal pain.   Genitourinary: Positive for vaginal discharge (clear fluid starting at 4am). Negative for vaginal bleeding.      Objective:     Vital Signs (Most Recent):    Vital Signs (24h Range):           There is no height or weight on file to calculate BMI.    FHT: 145, mod btbv, +accels, +occ decels, Cat 1  (reassuring)  TOCO: none    Physical Exam:   Constitutional: She is oriented to person, place, and time. She appears well-developed and well-nourished.    HENT:   Head: Normocephalic and atraumatic.    Eyes: EOM are normal.    Neck: Normal range of motion.    Cardiovascular: Normal rate.     Pulmonary/Chest: Effort normal. No respiratory distress.        Abdominal: Soft. There is no tenderness. There is no rebound and no guarding.   Gravid     Genitourinary:   Genitourinary Comments: Speculum exam:  Vulva, vagina wnl with no lesions, bleeding. Clear fluid present in posterior vault, moderate amount (1 tbsp). Positive nitrazine. Patient actively leaking fluid. No bleeding. Cervix is pink, mobile, non-tender with no lesions.    SVE: 1-1.5/40/-3               Neurological: She is alert and oriented to person, place, and time.    Skin: Skin is warm and dry.    Psychiatric: She has a normal mood and affect. Her behavior is normal. Judgment and thought content normal.       Cervix:  Dilation:  1.5  Effacement:  40  Station: -3  Presentation: Vertex     Significant Labs:  Lab Results   Component Value Date    GROUPTRH O POS 02/03/2017    HEPBSAG Negative 02/03/2017    STREPBCULT No Group B Streptococcus isolated 08/15/2017    AFP 0.81 MoM (34.3 ng/mL) 04/16/2010       I have personallly reviewed all pertinent lab results from the last 24 hours.

## 2017-09-13 NOTE — HOSPITAL COURSE
2017 - patient admitted to L&D for PROM. No ctx, denies Vb. Good fm.  2017 - PPD#1 s/p . Patient is doing well this morning. Meeting postpartum goals (ambulating, tolerating regular diet, voiding spontaneously, passing flatus)  09/15/2017: Patient is PPD#2 s/p . Patient continue to do well and is meeting all postpartum milestones. Patient to be discharged this morning with 6 week follow up.

## 2017-09-13 NOTE — ED PROVIDER NOTES
Encounter Date: 2017       History   No chief complaint on file.    Maribel Valderrama is a 32 y.o. [id number] at 39w2d presents complaining of leakage of fluids since 0400. She denies large gush but continues to have leakage of clear fluid.   Patient denies regular contractions, denies vaginal bleeding, reports LOF.   Fetal Movement: normal.  This IUP is complicated by  h/o marginal previa now resolved, h/o bilateral ovarian cystectomy, benign right ovarian teratoma, and h/o missed AB.      Review of patient's allergies indicates:  No Known Allergies  No past medical history on file.  Past Surgical History:   Procedure Laterality Date    Da Kory-assisted resection of complex left ovarian cyst x2 Left 2016    DONNA done at time of surgery -Dermoid and Hemorrhagic ovarian cyst    Ovarian Teratoma Removal Right 2010    15 weeks gestation, DERMOID RIGHT      Family History   Problem Relation Age of Onset    Hypertension Mother     Hypertension Father     Breast cancer Other     Cancer Other      cervical cancer    Diabetes Maternal Uncle     Ovarian cancer Neg Hx     Stroke Neg Hx     Colon cancer Neg Hx     Celiac disease Neg Hx     Cirrhosis Neg Hx     Colon polyps Neg Hx     Crohn's disease Neg Hx     Esophageal cancer Neg Hx     Inflammatory bowel disease Neg Hx     Liver cancer Neg Hx     Liver disease Neg Hx     Rectal cancer Neg Hx     Stomach cancer Neg Hx     Ulcerative colitis Neg Hx      Social History   Substance Use Topics    Smoking status: Never Smoker    Smokeless tobacco: Never Used    Alcohol use No     Review of Systems   Constitutional: Negative for chills and fever.   Respiratory: Negative for shortness of breath.    Cardiovascular: Negative for chest pain.   Gastrointestinal: Negative for abdominal pain.   Genitourinary: Positive for vaginal discharge. Negative for vaginal bleeding.       Physical Exam     Initial Vitals   BP Pulse Resp Temp SpO2   -- -- --  -- --      MAP       --       Temp:  [96.3 °F (35.7 °C)-97.5 °F (36.4 °C)] 96.4 °F (35.8 °C)  Pulse:  [] 83  Resp:  [17-18] 17  SpO2:  [91 %-100 %] 100 %  BP: (111-146)/(57-95) 116/75    Physical Exam    Vitals reviewed.  Constitutional: She appears well-developed and well-nourished.   HENT:   Head: Normocephalic and atraumatic.   Eyes: EOM are normal.   Neck: Normal range of motion.   Cardiovascular: Normal rate.   Pulmonary/Chest: No respiratory distress.   Abdominal: Soft. There is no tenderness. There is no rebound and no guarding.   Gravid   Neurological: She is alert and oriented to person, place, and time.   Skin: Skin is warm and dry.   Psychiatric: She has a normal mood and affect. Her behavior is normal. Judgment and thought content normal.     OB LABOR EXAM:             Dilatation: 1.   Station: -3.   Effacement: 40%.   Amniotic Fluid Color: clear.   Amniotic Fluid Amount: moderate.         ED Course   Obtain Fetal nonstress test (NST)  Date/Time: 2017 6:27 PM  Performed by: REDDY, SUSHMA K  Authorized by: REDDY, SUSHMA K     Nonstress Test:     Variability:  6-25 BPM    Decelerations: occasional.    Accelerations:  15 bpm    Acoustic Stimulator: No      Contractions:  Not present  Biophysical Profile:     Nonstress Test Interpretation: reactive      Overall Impression:  Reassuring      Labs Reviewed - No data to display          Medical Decision Making:   Initial Assessment:   31 yo  at 39w2d presents with clear fluid leakage since 7am. Denies Vb, ctx. Good fm  Differential Diagnosis:   ROM  ED Management:  ROM test showed pooling of clear fluid in posterior vagina, no bleeding.  SVE 1.5/40/-3  NST reactive and reassuring.    To L&D for delivery.              Attending Attestation:   Physician Attestation Statement for Resident:  As the supervising MD   Physician Attestation Statement: I have personally seen and examined this patient.   I agree with the above history. -:   As the  supervising MD I agree with the above PE.    As the supervising MD I agree with the above treatment, course, plan, and disposition.   -:   NST  I independently reviewed the fetal non-stress test with the following interpretation:  130 BPM baseline  Variability: moderate  Accelerations: present  Decelerations: absent  Contractions: occasional  Category 1    Clinical Interpretation: reactive    Patient evaluated and found to be stable, agree with resident's assessment of PROM at term and plan to admit to L+D.  I was personally present during the critical portions of the procedure(s) performed by the resident and was immediately available in the ED to provide services and assistance as needed during the entire procedure.  I have reviewed and agree with the residents interpretation of the following: lab data.                    ED Course      Clinical Impression:   Diagnoses of Indication for care in labor or delivery and PROM (premature rupture of membranes) were pertinent to this visit.                           Holly Mehta MD  09/14/17 0102

## 2017-09-13 NOTE — HPI
Maribel Valderrama is a 32 y.o. [id number] at 39w2d presents complaining of leakage of fluids since 400. She denies large gush but continues to have leakage of clear fluid.   Patient denies regular contractions, denies vaginal bleeding, reports LOF.   Fetal Movement: normal.  This IUP is complicated by  h/o marginal previa now resolved, h/o bilateral ovarian cystectomy, benign right ovarian teratoma, and h/o missed AB.

## 2017-09-13 NOTE — PROGRESS NOTES
"LABOR NOTE    S:  Complaints: No.  Epidural working:  not applicable  MD to bedside for cervical check     O: /73   Pulse 97   Temp 96.6 °F (35.9 °C) (Temporal)   Resp 17   Ht 5' 7" (1.702 m)   Wt 85.1 kg (187 lb 9.8 oz)   LMP 2016 (Exact Date)   SpO2 99%   BMI 29.38 kg/m²       FHT: 150s Cat 2 (reassuring) Mod btb variability, + acels, variable decels  CTX: q 2-4 minutes  SVE: 60/-2      ASSESSMENT:   32 y.o.  at 39w2d,     FHT reassuring    Active Hospital Problems    Diagnosis  POA    *PROM (premature rupture of membranes) [O42.90]  Unknown    Indication for care in labor or delivery [O75.9]  Yes      Resolved Hospital Problems    Diagnosis Date Resolved POA   No resolved problems to display.         PLAN:    Continue Close Maternal/Fetal Monitoring  Recheck 2 hours or PRN  Patient considering getting an epidural  Will place cook balloon and star pitocin once comfortable  Feel that she is tariq too much at this time to give another dose of cytotec because she intermittent periods of tariq every 2-3 mins        "

## 2017-09-13 NOTE — PROGRESS NOTES
"LABOR NOTE    S:  Complaints: No.  Epidural working:  yes  MD to bedside for cervical check     O: /73   Pulse 87   Temp 97 °F (36.1 °C) (Temporal)   Resp 17   Ht 5' 7" (1.702 m)   Wt 85.1 kg (187 lb 9.8 oz)   LMP 2016 (Exact Date)   SpO2 100%   BMI 29.38 kg/m²       FHT: 150s Cat 2 (reassuring) Mod btb variability, + acels, variable decels  CTX: q 2-4 minutes  SVE: 3/70/-2      ASSESSMENT:   32 y.o.  at 39w2d,     FHT reassuring    Active Hospital Problems    Diagnosis  POA    *PROM (premature rupture of membranes) [O42.90]  Unknown    Indication for care in labor or delivery [O75.9]  Yes      Resolved Hospital Problems    Diagnosis Date Resolved POA   No resolved problems to display.         PLAN:    Continue Close Maternal/Fetal Monitoring  Recheck 2 hours or PRN  Patient s/p epidural  Will start pitocin     "

## 2017-09-13 NOTE — H&P
[facility]  Obstetrics  History & Physical    Patient Name: Maribel Valderrama  MRN: [id number]  Admission Date: 2017  Primary Care Provider: Primary Doctor No    Subjective:     Principal Problem:PROM (premature rupture of membranes)    History of Present Illness:  Maribel Valderrama is a 32 y.o. [id number] at 39w2d presents complaining of leakage of fluids since 0400. She denies large gush but continues to have leakage of clear fluid.   Patient denies regular contractions, denies vaginal bleeding, reports LOF.   Fetal Movement: normal.  This IUP is complicated by  h/o marginal previa now resolved, h/o bilateral ovarian cystectomy, benign right ovarian teratoma, and h/o missed AB.      Obstetric History       T1      L1     SAB0   TAB0   Ectopic0   Multiple0   Live Births1       # Outcome Date GA Lbr Zeus/2nd Weight Sex Delivery Anes PTL Lv   3 Current            2 SAB 16 18w5d 20:00 / 00:13 0.28 kg (9.9 oz) M Vag-Spont OTHER Y FD      Apgar1:  0                Apgar5: 0   1 Term 09/25/10 39w4d  3.697 kg (8 lb 2.4 oz) M Vag-Spont EPI  TONYA      Name: Elton        No past medical history on file.  Past Surgical History:   Procedure Laterality Date    Da Kory-assisted resection of complex left ovarian cyst x2 Left 2016    DONNA done at time of surgery -Dermoid and Hemorrhagic ovarian cyst    Ovarian Teratoma Removal Right 2010    15 weeks gestation, DERMOID RIGHT        PTA Medications   Medication Sig    docusate sodium (COLACE) 100 MG capsule Take 1 capsule (100 mg total) by mouth daily as needed for Constipation.    PRENATAL VIT W-CA,FE,FA,<1 MG, (PRENATAL VITAMIN ORAL) Take by mouth.       Review of patient's allergies indicates:  No Known Allergies     Family History     Problem Relation (Age of Onset)    Breast cancer Other    Cancer Other    Diabetes Maternal Uncle    Hypertension Mother, Father        Social History Main Topics    Smoking status:  Never Smoker    Smokeless tobacco: Never Used    Alcohol use No    Drug use: No    Sexual activity: Yes     Partners: Male     Birth control/ protection: None      Comment:  to roxanne since 2009     Review of Systems   Constitutional: Negative for chills and fever.   Respiratory: Negative for shortness of breath.    Cardiovascular: Negative for chest pain.   Gastrointestinal: Negative for abdominal pain.   Genitourinary: Positive for vaginal discharge (clear fluid starting at 4am). Negative for vaginal bleeding.      Objective:     Vital Signs (Most Recent):    Vital Signs (24h Range):           There is no height or weight on file to calculate BMI.    FHT: 145, mod btbv, +accels, +occ decels, Cat 1 (reassuring)  TOCO: none    Physical Exam:   Constitutional: She is oriented to person, place, and time. She appears well-developed and well-nourished.    HENT:   Head: Normocephalic and atraumatic.    Eyes: EOM are normal.    Neck: Normal range of motion.    Cardiovascular: Normal rate.     Pulmonary/Chest: Effort normal. No respiratory distress.        Abdominal: Soft. There is no tenderness. There is no rebound and no guarding.   Gravid     Genitourinary:   Genitourinary Comments: Speculum exam:  Vulva, vagina wnl with no lesions, bleeding. Clear fluid present in posterior vault, moderate amount (1 tbsp). Positive nitrazine. Patient actively leaking fluid. No bleeding. Cervix is pink, mobile, non-tender with no lesions.    SVE: 1-1.5/40/-3               Neurological: She is alert and oriented to person, place, and time.    Skin: Skin is warm and dry.    Psychiatric: She has a normal mood and affect. Her behavior is normal. Judgment and thought content normal.       Cervix:  Dilation:  1.5  Effacement:  40  Station: -3  Presentation: Vertex     Significant Labs:  Lab Results   Component Value Date    GROUPTRH O POS 02/03/2017    HEPBSAG Negative 02/03/2017    STREPBCULT No Group B Streptococcus isolated  08/15/2017    AFP 0.81 MoM (34.3 ng/mL) 2010       I have personallly reviewed all pertinent lab results from the last 24 hours.    Assessment/Plan:     32 y.o. female  at 39w2d for:    PROM (premature rupture of membranes)    - Consents signed and to chart  - Admit to Labor and Delivery unit  - Plan for PO cytotec for labor management    - Epidural per Anesthesia  - Draw CBC, T&S  - Notify Staff  - Ultrasound performed, fetus in vertex position.  - Recheck in 4 hrs or PRN  - Post-Partum Hemorrhage risk - low            Sushma K Reddy, MD  Obstetrics  [facility]

## 2017-09-13 NOTE — ASSESSMENT & PLAN NOTE
- Consents signed and to chart  - Admit to Labor and Delivery unit  - Plan for PO cytotec for labor management    - Epidural per Anesthesia  - Draw CBC, T&S  - Notify Staff  - Ultrasound performed, fetus in vertex position.  - Recheck in 4 hrs or PRN  - Post-Partum Hemorrhage risk - low

## 2017-09-13 NOTE — ANESTHESIA PREPROCEDURE EVALUATION
2017     Maribel Valderrama is a 32 y.o. female  at 39w2d here in active labor.  Patient has had one previous IUFD and one previous normal delivery.  Prior delivery had working neuraxial anesthetic (IUFD was delivered with assistance of PCA).  Patient denies significant PMHx, denies blood thinner use.      OB History    Para Term  AB Living   3 1 1   1 1   SAB TAB Ectopic Multiple Live Births   1       1      # Outcome Date GA Lbr Zeus/2nd Weight Sex Delivery Anes PTL Lv   3 Current            2 SAB 16 18w5d 20:00 / 00:13 0.28 kg (9.9 oz) M Vag-Spont OTHER Y FD   1 Term 09/25/10 39w4d  3.697 kg (8 lb 2.4 oz) M Vag-Spont EPI  TONYA          Wt Readings from Last 1 Encounters:   17 1109 85.1 kg (187 lb 9.8 oz)       BP Readings from Last 3 Encounters:   17 120/78   17 (!) 108/58   17 122/64       Patient Active Problem List   Diagnosis    Gastroesophageal reflux disease    Supervision of other normal pregnancy    Prior pregnancy with fetal demise and current pregnancy in third trimester    Pelvic pressure in pregnancy, antepartum    Antepartum anemia in third trimester    Indication for care in labor or delivery    PROM (premature rupture of membranes)       Past Surgical History:   Procedure Laterality Date    Da Kory-assisted resection of complex left ovarian cyst x2 Left 2016    DONNA done at time of surgery -Dermoid and Hemorrhagic ovarian cyst    Ovarian Teratoma Removal Right 2010    15 weeks gestation, DERMOID RIGHT        Social History     Social History    Marital status:      Spouse name: Nomi    Number of children: 1    Years of education: N/A     Occupational History    Registered Nurse      [address] [place] [address]     Social History Main Topics    Smoking status: Never Smoker    Smokeless tobacco: Never Used    Alcohol  use No    Drug use: No    Sexual activity: Yes     Partners: Male     Birth control/ protection: None      Comment:  to roxanne since      Other Topics Concern    Not on file     Social History Narrative    No narrative on file         Chemistry        Component Value Date/Time     2016 0855    K 4.2 2016 0855     2016 0855    CO2 27 2016 0855    BUN 16 2016 0855    CREATININE 0.7 2016 0855    GLU 82 2016 0855        Component Value Date/Time    CALCIUM 9.3 2016 0855    ALKPHOS 61 2016 0855    AST 17 2016 0855    ALT 18 2016 0855    BILITOT 0.4 2016 0855    ESTGFRAFRICA >60.0 2016 0855    EGFRNONAA >60.0 2016 0855            Lab Results   Component Value Date    WBC 7.05 08/15/2017    HGB 11.3 (L) 08/15/2017    HCT 33.1 (L) 08/15/2017    MCV 98 08/15/2017     08/15/2017       No results for input(s): INR, PROTIME, APTT in the last 72 hours.    Invalid input(s): PT        Anesthesia Evaluation    I have reviewed the Patient Summary Reports.    I have reviewed the Nursing Notes.   I have reviewed the Medications.     Review of Systems  Anesthesia Hx:  Denies Family Hx of Anesthesia complications.   Denies Personal Hx of Anesthesia complications.   Hematology/Oncology:     Oncology Normal     Cardiovascular:  Cardiovascular Normal     Pulmonary:  Pulmonary Normal    Renal/:  Renal/ Normal     Hepatic/GI:   GERD, well controlled    Musculoskeletal:  Musculoskeletal Normal    OB/GYN/PEDS:  Planned Vaginal Delivery   3  , Para 1  Denies Issues with Current Pregnancy  Neuraxial Anesthesia - Previous History of no problems, type unknown    Neurological:  Neurology Normal    Endocrine:  Endocrine Normal    Psych:  Psychiatric Normal           Physical Exam  General:  Well nourished    Airway/Jaw/Neck:  Airway Findings: Mouth Opening: Normal Tongue: Normal  General Airway Assessment: Adult   Mallampati: III  Improves to II with phonation.  TM Distance: Normal, at least 6 cm  Jaw/Neck Findings:  Neck ROM: Normal ROM      Dental:  Dental Findings: In tact   Chest/Lungs:  Chest/Lungs Findings: Clear to auscultation, Normal Respiratory Rate     Heart/Vascular:  Heart Findings: Rate: Normal  Rhythm: Regular Rhythm        Mental Status:  Mental Status Findings:  Cooperative, Alert and Oriented         Anesthesia Plan  Type of Anesthesia, risks & benefits discussed:  Anesthesia Type:  CSE, epidural, general, spinal  Patient's Preference:   Intra-op Monitoring Plan:   Intra-op Monitoring Plan Comments:   Post Op Pain Control Plan: multimodal analgesia and per primary service following discharge from PACU  Post Op Pain Control Plan Comments:   Induction:    Beta Blocker:  Patient is not currently on a Beta-Blocker (No further documentation required).       Informed Consent: Patient understands risks and agrees with Anesthesia plan.  Questions answered. Anesthesia consent signed with patient.  ASA Score: 2     Day of Surgery Review of History & Physical:    H&P update referred to the provider.         Ready For Surgery From Anesthesia Perspective.

## 2017-09-14 LAB
BASOPHILS # BLD AUTO: 0.01 K/UL
BASOPHILS NFR BLD: 0.1 %
DIFFERENTIAL METHOD: ABNORMAL
EOSINOPHIL # BLD AUTO: 0.1 K/UL
EOSINOPHIL NFR BLD: 0.6 %
ERYTHROCYTE [DISTWIDTH] IN BLOOD BY AUTOMATED COUNT: 13 %
HCT VFR BLD AUTO: 32.4 %
HGB BLD-MCNC: 11 G/DL
LYMPHOCYTES # BLD AUTO: 1.3 K/UL
LYMPHOCYTES NFR BLD: 12.9 %
MCH RBC QN AUTO: 33.3 PG
MCHC RBC AUTO-ENTMCNC: 34 G/DL
MCV RBC AUTO: 98 FL
MONOCYTES # BLD AUTO: 0.9 K/UL
MONOCYTES NFR BLD: 8.6 %
NEUTROPHILS # BLD AUTO: 7.9 K/UL
NEUTROPHILS NFR BLD: 77 %
PLATELET # BLD AUTO: 206 K/UL
PMV BLD AUTO: 9.5 FL
RBC # BLD AUTO: 3.3 M/UL
WBC # BLD AUTO: 10.27 K/UL

## 2017-09-14 PROCEDURE — 11000001 HC ACUTE MED/SURG PRIVATE ROOM

## 2017-09-14 PROCEDURE — 36415 COLL VENOUS BLD VENIPUNCTURE: CPT

## 2017-09-14 PROCEDURE — 99024 POSTOP FOLLOW-UP VISIT: CPT | Mod: ,,, | Performed by: OBSTETRICS & GYNECOLOGY

## 2017-09-14 PROCEDURE — 25000003 PHARM REV CODE 250: Performed by: STUDENT IN AN ORGANIZED HEALTH CARE EDUCATION/TRAINING PROGRAM

## 2017-09-14 PROCEDURE — 72100003 HC LABOR CARE, EA. ADDL. 8 HRS

## 2017-09-14 PROCEDURE — 85025 COMPLETE CBC W/AUTO DIFF WBC: CPT

## 2017-09-14 RX ORDER — HYDROCORTISONE 25 MG/G
CREAM TOPICAL 3 TIMES DAILY PRN
Status: DISCONTINUED | OUTPATIENT
Start: 2017-09-14 | End: 2017-09-15 | Stop reason: HOSPADM

## 2017-09-14 RX ORDER — IBUPROFEN 600 MG/1
600 TABLET ORAL EVERY 6 HOURS
Status: DISCONTINUED | OUTPATIENT
Start: 2017-09-14 | End: 2017-09-15 | Stop reason: HOSPADM

## 2017-09-14 RX ORDER — OXYTOCIN/RINGER'S LACTATE 20/1000 ML
41.65 PLASTIC BAG, INJECTION (ML) INTRAVENOUS CONTINUOUS
Status: ACTIVE | OUTPATIENT
Start: 2017-09-14 | End: 2017-09-14

## 2017-09-14 RX ORDER — DIPHENHYDRAMINE HYDROCHLORIDE 50 MG/ML
25 INJECTION INTRAMUSCULAR; INTRAVENOUS EVERY 4 HOURS PRN
Status: DISCONTINUED | OUTPATIENT
Start: 2017-09-14 | End: 2017-09-15 | Stop reason: HOSPADM

## 2017-09-14 RX ORDER — DOCUSATE SODIUM 100 MG/1
200 CAPSULE, LIQUID FILLED ORAL 2 TIMES DAILY PRN
Status: DISCONTINUED | OUTPATIENT
Start: 2017-09-14 | End: 2017-09-15 | Stop reason: HOSPADM

## 2017-09-14 RX ORDER — HYDROCODONE BITARTRATE AND ACETAMINOPHEN 10; 325 MG/1; MG/1
1 TABLET ORAL EVERY 4 HOURS PRN
Status: DISCONTINUED | OUTPATIENT
Start: 2017-09-14 | End: 2017-09-15 | Stop reason: HOSPADM

## 2017-09-14 RX ORDER — ONDANSETRON 8 MG/1
8 TABLET, ORALLY DISINTEGRATING ORAL EVERY 8 HOURS PRN
Status: DISCONTINUED | OUTPATIENT
Start: 2017-09-14 | End: 2017-09-15 | Stop reason: HOSPADM

## 2017-09-14 RX ORDER — HYDROCODONE BITARTRATE AND ACETAMINOPHEN 5; 325 MG/1; MG/1
1 TABLET ORAL EVERY 4 HOURS PRN
Status: DISCONTINUED | OUTPATIENT
Start: 2017-09-14 | End: 2017-09-15 | Stop reason: HOSPADM

## 2017-09-14 RX ORDER — DIPHENHYDRAMINE HCL 25 MG
25 CAPSULE ORAL EVERY 4 HOURS PRN
Status: DISCONTINUED | OUTPATIENT
Start: 2017-09-14 | End: 2017-09-15 | Stop reason: HOSPADM

## 2017-09-14 RX ORDER — ACETAMINOPHEN 325 MG/1
650 TABLET ORAL EVERY 6 HOURS PRN
Status: DISCONTINUED | OUTPATIENT
Start: 2017-09-14 | End: 2017-09-15 | Stop reason: HOSPADM

## 2017-09-14 RX ADMIN — IBUPROFEN 600 MG: 600 TABLET, FILM COATED ORAL at 05:09

## 2017-09-14 RX ADMIN — IBUPROFEN 600 MG: 600 TABLET, FILM COATED ORAL at 11:09

## 2017-09-14 RX ADMIN — HYDROCODONE BITARTRATE AND ACETAMINOPHEN 1 TABLET: 5; 325 TABLET ORAL at 02:09

## 2017-09-14 RX ADMIN — HYDROCODONE BITARTRATE AND ACETAMINOPHEN 1 TABLET: 5; 325 TABLET ORAL at 11:09

## 2017-09-14 RX ADMIN — DOCUSATE SODIUM 200 MG: 100 CAPSULE, LIQUID FILLED ORAL at 11:09

## 2017-09-14 NOTE — PROGRESS NOTES
"LABOR NOTE    S:  Complaints: No.  Epidural working:  yes  MD to bedside for cervical check     O: /73   Pulse 70   Temp 96.3 °F (35.7 °C) (Temporal)   Resp 17   Ht 5' 7" (1.702 m)   Wt 85.1 kg (187 lb 9.8 oz)   LMP 2016 (Exact Date)   SpO2 100%   Breastfeeding? No   BMI 29.38 kg/m²       FHT: 150s Cat 2 (reassuring) Mod btb variability, + acels, variable decels  CTX: q 2-4 minutes  SVE: 5/80/-2  IUPC in place  Clear to blood tinged fluid presents      ASSESSMENT:   32 y.o.  at 39w2d, induction of labor 2/2 PROM    FHT reassuring    Active Hospital Problems    Diagnosis  POA    *PROM (premature rupture of membranes) [O42.90]  Unknown    Indication for care in labor or delivery [O75.9]  Yes      Resolved Hospital Problems    Diagnosis Date Resolved POA   No resolved problems to display.         PLAN:    Continue Close Maternal/Fetal Monitoring  Pitocin per protocol, currently at 6mU/min  IUPC in place  Recheck 2 hours or PRN      Richie Velazco MD  PGY 4 OB/GYN  [phone]    "

## 2017-09-14 NOTE — ASSESSMENT & PLAN NOTE
Postpartum care:  - Patient doing well. Continue routine management and advances.  - Continue PO pain meds. Pain well controlled.  - Encourage ambulation.   - Heme: Pre Delivery h/h 13/38 --> Post Delivery h/h 11/34  - Circumcision - Consents signed and order placed   - Contraception - will address at postpartum follow up  - Lactation - The patient is breast and bottle feeding. Lactation nurse following along PRN  - Rh Status - pos

## 2017-09-14 NOTE — PROGRESS NOTES
[facility]  Obstetrics  Postpartum Progress Note    Patient Name: Maribel Valderrama  MRN: [id number]  Admission Date: 2017  Hospital Length of Stay: 1 days  Attending Physician: Alyssia Chavarria MD  Primary Care Provider: Primary Doctor No    Subjective:     Principal Problem:PROM (premature rupture of membranes)    Hospital course: 2017 - patient admitted to L&D for PROM. No ctx, denies Vb. Good fm.  2017 - PPD#1 s/p . Patient is doing well this morning. Meeting postpartum goals (ambulating, tolerating regular diet, voiding spontaneously, passing flatus)    Interval History:     She is doing well this morning. She is tolerating a regular diet without nausea or vomiting. She is voiding spontaneously. She is ambulating. She has passed flatus, and has not a BM. Vaginal bleeding is moderate. She denies fever or chills. Abdominal pain is mild and controlled with oral medications. She is breastfeeding and bottle feeding. She desires circumcision for her male baby: yes.    Objective:     Vital Signs (Most Recent):  Temp: 97.8 °F (36.6 °C) (17)  Pulse: 80 (17)  Resp: 18 (17)  BP: 111/72 (17)  SpO2: 99 % (17) Vital Signs (24h Range):  Temp:  [96.3 °F (35.7 °C)-98 °F (36.7 °C)] 97.8 °F (36.6 °C)  Pulse:  [] 80  Resp:  [17-18] 18  SpO2:  [91 %-100 %] 99 %  BP: (111-146)/(57-95) 111/72     Weight: 85.1 kg (187 lb 9.8 oz)  Body mass index is 29.38 kg/m².      Intake/Output Summary (Last 24 hours) at 17 0637  Last data filed at 17 0540   Gross per 24 hour   Intake          3434.17 ml   Output             1614 ml   Net          1820.17 ml       Significant Labs:  Lab Results   Component Value Date    GROUPTRH O POS 2017    HEPBSAG Negative 2017    STREPBCULT No Group B Streptococcus isolated 08/15/2017    AFP 0.81 MoM (34.3 ng/mL) 2010       Recent Labs  Lab 17  0442   HGB 11.0*   HCT  32.4*       I have personallly reviewed all pertinent lab results from the last 24 hours.    Physical Exam:   Constitutional: She is oriented to person, place, and time. She appears well-developed and well-nourished.    HENT:   Head: Normocephalic and atraumatic.    Eyes: EOM are normal.    Neck: Normal range of motion.    Cardiovascular: Normal rate.     Pulmonary/Chest: Effort normal. No respiratory distress.        Abdominal: Soft. There is no tenderness. There is no rebound and no guarding.   Fundus firm below umbilicus                 Neurological: She is alert and oriented to person, place, and time.    Skin: Skin is warm and dry.    Psychiatric: She has a normal mood and affect. Her behavior is normal. Judgment and thought content normal.       Assessment/Plan:     32 y.o. female  for:     (spontaneous vaginal delivery)    Postpartum care:  - Patient doing well. Continue routine management and advances.  - Continue PO pain meds. Pain well controlled.  - Encourage ambulation.   - Heme: Pre Delivery h/h  --> Post Delivery h/h   - Circumcision - Consents signed and order placed   - Contraception - will address at postpartum follow up  - Lactation - The patient is breast and bottle feeding. Lactation nurse following along PRN  - Rh Status - pos            Disposition: As patient meets milestones, will plan to discharge PPD#1-2.    Sushma K Reddy, MD  Obstetrics  [facility]

## 2017-09-14 NOTE — PROGRESS NOTES
Pt requesting abdominal binder for comfort. Dr. Arnoldo rothman with pt applying abdominal binder.

## 2017-09-14 NOTE — ANESTHESIA POSTPROCEDURE EVALUATION
"Anesthesia Post Evaluation    Patient: Maribel Valderrama    Procedure(s) Performed: * No procedures listed *    Final Anesthesia Type: epidural  Patient location during evaluation: floor  Patient participation: Yes- Able to Participate  Level of consciousness: awake and alert  Post-procedure vital signs: reviewed and stable  Pain management: adequate  Airway patency: patent  PONV status at discharge: No PONV  Anesthetic complications: no      Cardiovascular status: blood pressure returned to baseline  Respiratory status: unassisted  Hydration status: euvolemic  Follow-up not needed.        Visit Vitals  /63   Pulse 82   Temp 36.5 °C (97.7 °F) (Oral)   Resp 18   Ht 5' 7" (1.702 m)   Wt 85.1 kg (187 lb 9.8 oz)   LMP 12/12/2016 (Exact Date)   SpO2 99%   Breastfeeding? Unknown   BMI 29.38 kg/m²       Pain/Mouna Score: Pain Rating Prior to Med Admin: 5 (9/14/2017 11:28 AM)  Pain Rating Post Med Admin: 2 (9/14/2017  3:41 AM)      "

## 2017-09-14 NOTE — PROGRESS NOTES
"LABOR NOTE    S:  Complaints: No.  Epidural working:  yes  MD to bedside for cervical check     O: BP (!) 117/57   Pulse 78   Temp 96.4 °F (35.8 °C)   Resp 17   Ht 5' 7" (1.702 m)   Wt 85.1 kg (187 lb 9.8 oz)   LMP 2016 (Exact Date)   SpO2 100%   Breastfeeding? No   BMI 29.38 kg/m²       FHT: 150s Cat 2 (reassuring) Mod btb variability, + acels, variable decels  CTX: q 2-4 minutes  SVE:10/100/+2      ASSESSMENT:   32 y.o.  at 39w2d, induction of labor 2/2 PROM    FHT reassuring    Active Hospital Problems    Diagnosis  POA    *PROM (premature rupture of membranes) [O42.90]  Unknown    Indication for care in labor or delivery [O75.9]  Yes      Resolved Hospital Problems    Diagnosis Date Resolved POA   No resolved problems to display.         PLAN:    Continue Close Maternal/Fetal Monitoring  Notify Staff   Setup to push    Richie Velazco MD  PGY 4 OB/GYN  [phone]    "

## 2017-09-14 NOTE — SUBJECTIVE & OBJECTIVE
Hospital course: 2017 - patient admitted to L&D for PROM. No ctx, denies Vb. Good fm.  2017 - PPD#1 s/p . Patient is doing well this morning. Meeting postpartum goals (ambulating, tolerating regular diet, voiding spontaneously, passing flatus)    Interval History:     She is doing well this morning. She is tolerating a regular diet without nausea or vomiting. She is voiding spontaneously. She is ambulating. She has passed flatus, and has not a BM. Vaginal bleeding is moderate. She denies fever or chills. Abdominal pain is mild and controlled with oral medications. She is breastfeeding and bottle feeding. She desires circumcision for her male baby: yes.    Objective:     Vital Signs (Most Recent):  Temp: 97.8 °F (36.6 °C) (17 024)  Pulse: 80 (17)  Resp: 18 (17)  BP: 111/72 (17)  SpO2: 99 % (17) Vital Signs (24h Range):  Temp:  [96.3 °F (35.7 °C)-98 °F (36.7 °C)] 97.8 °F (36.6 °C)  Pulse:  [] 80  Resp:  [17-18] 18  SpO2:  [91 %-100 %] 99 %  BP: (111-146)/(57-95) 111/72     Weight: 85.1 kg (187 lb 9.8 oz)  Body mass index is 29.38 kg/m².      Intake/Output Summary (Last 24 hours) at 17 0637  Last data filed at 17 0540   Gross per 24 hour   Intake          3434.17 ml   Output             1614 ml   Net          1820.17 ml       Significant Labs:  Lab Results   Component Value Date    GROUPTRH O POS 2017    HEPBSAG Negative 2017    STREPBCULT No Group B Streptococcus isolated 08/15/2017    AFP 0.81 MoM (34.3 ng/mL) 2010       Recent Labs  Lab 17  0442   HGB 11.0*   HCT 32.4*       I have personallly reviewed all pertinent lab results from the last 24 hours.    Physical Exam:   Constitutional: She is oriented to person, place, and time. She appears well-developed and well-nourished.    HENT:   Head: Normocephalic and atraumatic.    Eyes: EOM are normal.    Neck: Normal range of motion.    Cardiovascular: Normal rate.      Pulmonary/Chest: Effort normal. No respiratory distress.        Abdominal: Soft. There is no tenderness. There is no rebound and no guarding.   Fundus firm below umbilicus                 Neurological: She is alert and oriented to person, place, and time.    Skin: Skin is warm and dry.    Psychiatric: She has a normal mood and affect. Her behavior is normal. Judgment and thought content normal.

## 2017-09-14 NOTE — L&D DELIVERY NOTE
Delivery Information for  Trevor Valderrama    Birth information:  YOB: 2017   Time of birth: 9:57 PM   Sex: male   Head Delivery Date/Time: 2017  9:57 PM   Delivery type: Vaginal, Spontaneous Delivery   Gestational Age: 39w2d    Delivery Providers    Delivering clinician:  Jane Johnson MD   Other personnel:   Provider Role   MD Wilda Matias RN Macy L. Manzella Lauren Borgstede, RN                 Measurements    Weight:  3650 g Length:  49.5 cm   Head circum.:  34.3 cm Chest circum.:  33 cm           Assessment    Living status:  Living  Apgars:     1 Minute:   5 Minute:   10 Minute:   15 Minute:   20 Minute:     Skin Color:   1  1       Heart Rate:   2  2       Reflex Irritability:   2  2       Muscle Tone:   2  2       Respiratory Effort:   2  2       Total:   9  9               Apgars Assigned By:  DREAD CHAUHAN RN         Assisted Delivery Details:    Forceps attempted?:  No  Vacuum extractor attempted?:  No         Shoulder Dystocia    Shoulder dystocia present?:  No           Presentation and Position    Presentation:   Vertex   Position:                   Interventions/Resuscitation    Method:  None       Cord    Vessels:  3 vessels  Complications:  Nuchal  Nuchal Intervention:  reduced  Nuchal Cord Description:  loose nuchal cord  Number of Loops:  1  Delayed Cord Clamping?:  Yes  Cord Clamped Date/Time:  2017  9:58 PM  Cord Blood Disposition:  Sent with Baby  Gases Sent?:  Yes  Stem Cell Collection (by MD):  No       Placenta    Date and time:  2017  9:59 PM  Removal:  Spontaneous  Appearance:  Intact  Placenta disposition:  discarded           Labor Events:       labor: No     Labor Onset Date/Time:         Dilation Complete Date/Time: 2017 21:35     Start Pushing Date/Time: 2017 21:52     Rupture Date/Time:              Rupture type:           Fluid Amount:        Fluid Color:        Fluid  Odor:        Membrane Status (PeriCalm): SRM (Spontaneous Rupture)      Rupture Date/Time (PeriCalm): 2017 04:00:00      Fluid Amount (PeriCalm): Moderate      Fluid Color (PeriCalm): Clear       steroids: None     Antibiotics given for GBS: No     Induction:       Indications for induction:        Augmentation: oxytocin     Indications for augmentation:       Labor complications: None     Additional complications:          Cervical ripening:                     Delivery:      Episiotomy: None     Indication for Episiotomy:       Perineal Lacerations: None Repaired:      Periurethral Laceration: none Repaired:     Labial Laceration: none Repaired:     Sulcus Laceration: none Repaired:     Vaginal Laceration: No Repaired:     Cervical Laceration: No Repaired:     Repair suture: None     Repair # of packets:       Vaginal delivery QBL (mL): 214      QBL (mL): 0     Combined Blood Loss (mL): 214     Vaginal Sweep Performed: No     Surgicount Correct: No       Other providers:       Anesthesia    Method:  Epidural          Details (if applicable):  Trial of Labor      Categorization:      Priority:     Indications for :     Incision Type:       Additional  information:  Forceps:    Vacuum:    Breech:    Observed anomalies    Other (Comments):            cephalic after approximately 10 minutes of maternal pushing.  Under epidural anesthesia.  Infant delivered OA over INTACT perineum.  Nuchal x1 reduced at introitus.  MALE infant also tolerated the delivery well and was placed on mothers abdomen for skin to skin and bulb suctioning performed.  Cord clamped and cut.  Umbilical arterial gas and venous blood obtained.  Posterior vaginal abrasion found to be hemostatic.  Placenta delivered spontaneously and IV pitocin given.  Uterine tone noted. No cervical lacerations.  Patient tolerated delivery well.  EBL 200cc  Staff present for entire procedure.  S/L/N  counts correct x2.      Richie Velazco MD  PGY 4 OB/GYN  [phone]

## 2017-09-15 VITALS
HEART RATE: 90 BPM | WEIGHT: 187.63 LBS | OXYGEN SATURATION: 98 % | BODY MASS INDEX: 29.45 KG/M2 | RESPIRATION RATE: 18 BRPM | TEMPERATURE: 98 F | DIASTOLIC BLOOD PRESSURE: 66 MMHG | HEIGHT: 67 IN | SYSTOLIC BLOOD PRESSURE: 113 MMHG

## 2017-09-15 PROCEDURE — 25000003 PHARM REV CODE 250: Performed by: STUDENT IN AN ORGANIZED HEALTH CARE EDUCATION/TRAINING PROGRAM

## 2017-09-15 RX ORDER — IBUPROFEN 600 MG/1
600 TABLET ORAL EVERY 6 HOURS
Qty: 40 TABLET | Refills: 0 | Status: SHIPPED | OUTPATIENT
Start: 2017-09-15 | End: 2017-10-30

## 2017-09-15 RX ADMIN — IBUPROFEN 600 MG: 600 TABLET, FILM COATED ORAL at 05:09

## 2017-09-15 NOTE — DISCHARGE SUMMARY
[facility]  Obstetrics  Discharge Summary      Patient Name: Maribel Valderrama  MRN: [id number]  Admission Date: 2017  Hospital Length of Stay: 2 days  Discharge Date and Time:  09/15/2017 7:56 AM  Attending Physician: Alyssia Chavarria MD   Discharging Provider: Sushma K Reddy, MD  Primary Care Provider: Primary Doctor No    HPI: Maribel Valderrama is a 32 y.o. [id number] at 39w2d presents complaining of leakage of fluids since 0400. She denies large gush but continues to have leakage of clear fluid.   Patient denies regular contractions, denies vaginal bleeding, reports LOF.   Fetal Movement: normal.  This IUP is complicated by  h/o marginal previa now resolved, h/o bilateral ovarian cystectomy, benign right ovarian teratoma, and h/o missed AB.    * No surgery found *     Hospital Course:   2017 - patient admitted to L&D for PROM. No ctx, denies Vb. Good fm.  2017 - PPD#1 s/p . Patient is doing well this morning. Meeting postpartum goals (ambulating, tolerating regular diet, voiding spontaneously, passing flatus)  09/15/2017: Patient is PPD#2 s/p . Patient continue to do well and is meeting all postpartum milestones. Patient to be discharged this morning with 6 week follow up.         Final Active Diagnoses:    Diagnosis Date Noted POA    PRINCIPAL PROBLEM:   (spontaneous vaginal delivery) [O80] 2017 Not Applicable      Problems Resolved During this Admission:    Diagnosis Date Noted Date Resolved POA    Indication for care in labor or delivery [O75.9] 2017 Yes    PROM (premature rupture of membranes) [O42.90] 2017 Unknown        Labs:   CBC   Recent Labs  Lab 17  1043 17  0442   WBC 7.34 10.27   HGB 13.1 11.0*   HCT 38.3 32.4*    206       Feeding Method: breast and bottle    Immunizations     Date Immunization Status Dose Route/Site Given by    17 0116 MMR Incomplete 0.5 mL Subcutaneous/Left  deltoid     17 0116 Tdap Incomplete 0.5 mL Intramuscular/Left deltoid           Delivery:    Episiotomy: None   Lacerations: None   Repair suture: None   Repair # of packets:     Blood loss (ml): 214     Birth information:  YOB: 2017   Time of birth: 9:57 PM   Sex: male   Delivery type: Vaginal, Spontaneous Delivery   Gestational Age: 39w2d    Delivery Clinician:      Other providers:       Additional  information:  Forceps:    Vacuum:    Breech:    Observed anomalies      Living?:           APGARS  One minute Five minutes Ten minutes   Skin color:         Heart rate:         Grimace:         Muscle tone:         Breathing:         Totals: 9  9        Placenta: Delivered:       appearance    Pending Diagnostic Studies:     None          Discharged Condition: good    Disposition: Home or Self Care    Follow Up:  Follow-up Information     Alyssia Chavarria MD In 6 weeks.    Specialties:  Obstetrics, Obstetrics and Gynecology  Why:  post partum  Contact information:  [address] [address]  [phone]                 Patient Instructions:     Diet general     Activity as tolerated     Call MD for:  temperature >100.4     Call MD for:  persistent nausea and vomiting or diarrhea     Call MD for:  severe uncontrolled pain     Call MD for:  redness, tenderness, or signs of infection (pain, swelling, redness, odor or green/yellow discharge around incision site)     Call MD for:  difficulty breathing or increased cough     Call MD for:  severe persistent headache     Call MD for:  worsening rash     Call MD for:  persistent dizziness, light-headedness, or visual disturbances     Call MD for:  increased confusion or weakness       Medications:  Current Discharge Medication List      START taking these medications    Details   ibuprofen (ADVIL,MOTRIN) 600 MG tablet Take 1 tablet (600 mg total) by mouth every 6 (six) hours.  Qty: 40 tablet, Refills: 0    Associated Diagnoses:   (spontaneous vaginal delivery)         CONTINUE these medications which have NOT CHANGED    Details   docusate sodium (COLACE) 100 MG capsule Take 1 capsule (100 mg total) by mouth daily as needed for Constipation.  Qty: 30 capsule, Refills: 3    Associated Diagnoses: 17 weeks gestation of pregnancy; Constipation during pregnancy, second trimester      PRENATAL VIT W-CA,FE,FA,<1 MG, (PRENATAL VITAMIN ORAL) Take by mouth.             Sushma K Reddy, MD  Obstetrics  [facility]    SEEN AND EXAMINED BY ME  AGREE WITH ABOVE    Romario Crandall MD

## 2017-09-15 NOTE — PROGRESS NOTES
[facility]  Obstetrics  Postpartum Progress Note    Patient Name: Maribel Valderrama  MRN: [id number]  Admission Date: 2017  Hospital Length of Stay: 2 days  Attending Physician: Alyssia Chavarria MD  Primary Care Provider: Primary Doctor No    Subjective:     Principal Problem: (spontaneous vaginal delivery)    Hospital course: 2017 - patient admitted to L&D for PROM. No ctx, denies Vb. Good fm.  2017 - PPD#1 s/p . Patient is doing well this morning. Meeting postpartum goals (ambulating, tolerating regular diet, voiding spontaneously, passing flatus)  09/15/2017: Patient is PPD#2 s/p . Patient continue to do well and is meeting all postpartum milestones. Patient to be discharged this morning with 6 week follow up.     Interval History:     She is doing well this morning. She is tolerating a regular diet without nausea or vomiting. She is voiding spontaneously. She is ambulating. She has passed flatus, and has not a BM. Vaginal bleeding is mild. She denies fever or chills. Abdominal pain is mild and controlled with oral medications. She is breastfeeding and bottle feeding.    Objective:     Vital Signs (Most Recent):  Temp: 98.4 °F (36.9 °C) (17)  Pulse: 77 (17)  Resp: 18 (17)  BP: 128/67 (17)  SpO2: 99 % (17) Vital Signs (24h Range):  Temp:  [97.7 °F (36.5 °C)-98.4 °F (36.9 °C)] 98.4 °F (36.9 °C)  Pulse:  [75-82] 77  Resp:  [18] 18  SpO2:  [99 %] 99 %  BP: (110-128)/(63-67) 128/67     Weight: 85.1 kg (187 lb 9.8 oz)  Body mass index is 29.38 kg/m².      Intake/Output Summary (Last 24 hours) at 09/15/17 0655  Last data filed at 17 1400   Gross per 24 hour   Intake                0 ml   Output              600 ml   Net             -600 ml       Significant Labs:  Lab Results   Component Value Date    GROUPTRH O POS 2017    HEPBSAG Negative 2017    STREPBCULT No Group B Streptococcus isolated  08/15/2017    AFP 0.81 MoM (34.3 ng/mL) 2010       Recent Labs  Lab 17  0442   HGB 11.0*   HCT 32.4*       I have personallly reviewed all pertinent lab results from the last 24 hours.    Physical Exam    Assessment/Plan:     32 y.o. female  for:    *  (spontaneous vaginal delivery)    Postpartum care:  - Patient doing well. Continue routine management and advances.  - Continue PO pain meds. Pain well controlled.  - Encourage ambulation.   - Heme: Pre Delivery h/h  --> Post Delivery h/h   - Circumcision - Consents signed and order placed   - Contraception - will address at postpartum follow up  - Lactation - The patient is breast and bottle feeding. Lactation nurse following along PRN  - Rh Status - pos            Disposition: As patient meets milestones, will plan to discharge PPD#2.    Sushma K Reddy, MD  Obstetrics  [facility]  SEEN AND EXAMINED BY ME  AGREE WITH ABOVE    Romario Crandall MD

## 2017-09-15 NOTE — DISCHARGE SUMMARY
[facility]  Obstetrics  Discharge Summary      Patient Name: Maribel Valderrama  MRN: [id number]  Admission Date: 2017  Hospital Length of Stay: 2 days  Discharge Date and Time:  09/15/2017 6:44 AM  Attending Physician: Alyssia Chavarria MD   Discharging Provider: Richie Velazco MD  Primary Care Provider: Primary Doctor No    HPI: Maribel Valderrama is a 32 y.o. [id number] at 39w2d presents complaining of leakage of fluids since 0400. She denies large gush but continues to have leakage of clear fluid.   Patient denies regular contractions, denies vaginal bleeding, reports LOF.   Fetal Movement: normal.  This IUP is complicated by  h/o marginal previa now resolved, h/o bilateral ovarian cystectomy, benign right ovarian teratoma, and h/o missed AB.    * No surgery found *     Hospital Course:   2017 - patient admitted to L&D for PROM. No ctx, denies Vb. Good fm.  2017 - PPD#1 s/p . Patient is doing well this morning. Meeting postpartum goals (ambulating, tolerating regular diet, voiding spontaneously, passing flatus)  09/15/2017: Patient is PPD#2 s/p . Patient continue to do well and is meeting all postpartum milestones. Patient to be discharged this morning with 6 week follow up.         Final Active Diagnoses:    Diagnosis Date Noted POA    PRINCIPAL PROBLEM:   (spontaneous vaginal delivery) [O80] 2017 Not Applicable      Problems Resolved During this Admission:    Diagnosis Date Noted Date Resolved POA    Indication for care in labor or delivery [O75.9] 2017 Yes    PROM (premature rupture of membranes) [O42.90] 2017 Unknown        Labs:   CBC   Recent Labs  Lab 17  1043 17  0442   WBC 7.34 10.27   HGB 13.1 11.0*   HCT 38.3 32.4*    206       Feeding Method: breast    Immunizations     Date Immunization Status Dose Route/Site Given by    17 0116 MMR Incomplete 0.5 mL Subcutaneous/Left  deltoid     17 0116 Tdap Incomplete 0.5 mL Intramuscular/Left deltoid           Delivery:    Episiotomy: None   Lacerations: None   Repair suture: None   Repair # of packets:     Blood loss (ml): 214     Birth information:  YOB: 2017   Time of birth: 9:57 PM   Sex: male   Delivery type: Vaginal, Spontaneous Delivery   Gestational Age: 39w2d    Delivery Clinician:      Other providers:       Additional  information:  Forceps:    Vacuum:    Breech:    Observed anomalies      Living?:           APGARS  One minute Five minutes Ten minutes   Skin color:         Heart rate:         Grimace:         Muscle tone:         Breathing:         Totals: 9  9        Placenta: Delivered:       appearance    Pending Diagnostic Studies:     None          Discharged Condition: good    Disposition: Home or Self Care    Follow Up:  Follow-up Information     Alyssia Chavarria MD In 6 weeks.    Specialties:  Obstetrics, Obstetrics and Gynecology  Why:  post partum  Contact information:  [address] [address]  [phone]                 Patient Instructions:     Diet general     Activity as tolerated     Call MD for:  temperature >100.4     Call MD for:  persistent nausea and vomiting or diarrhea     Call MD for:  severe uncontrolled pain     Call MD for:  redness, tenderness, or signs of infection (pain, swelling, redness, odor or green/yellow discharge around incision site)     Call MD for:  difficulty breathing or increased cough     Call MD for:  severe persistent headache     Call MD for:  worsening rash     Call MD for:  persistent dizziness, light-headedness, or visual disturbances     Call MD for:  increased confusion or weakness       Medications:  Current Discharge Medication List      START taking these medications    Details   ibuprofen (ADVIL,MOTRIN) 600 MG tablet Take 1 tablet (600 mg total) by mouth every 6 (six) hours.  Qty: 40 tablet, Refills: 0    Associated Diagnoses:   (spontaneous vaginal delivery)         CONTINUE these medications which have NOT CHANGED    Details   docusate sodium (COLACE) 100 MG capsule Take 1 capsule (100 mg total) by mouth daily as needed for Constipation.  Qty: 30 capsule, Refills: 3    Associated Diagnoses: 17 weeks gestation of pregnancy; Constipation during pregnancy, second trimester      PRENATAL VIT W-CA,FE,FA,<1 MG, (PRENATAL VITAMIN ORAL) Take by mouth.             Richie Velazco MD  Obstetrics  [facility]    SEEN AND EXAMINED BY ME  AGREE WITH ABOVE    Romario Crandall MD

## 2017-09-15 NOTE — DISCHARGE INSTRUCTIONS
Breastfeeding discharge instructions given with First Alert form and reviewed.  Also discussed:   AAP recommendation of exclusive breastfeeding for the first 6 months of life and continued breastfeeding with the introduction of supplemental foods beyond the first year of life.  Instructed on the recommendation to delay all bottle and pacifier use until after 4 weeks of age and breastfeeding is well established.  Discussed the benefits of exclusive breastfeeding for both mother and baby.  Discussed the risks of supplementation/pacifier use on the exclusivity of breastfeeding in the first 6 months. Feed the baby at the earliest sign of hunger or comfort  o Hands to mouth, sucking motions  o Rooting or searching for something to suck on  o Dont wait for crying - it is a not a late sign of hunger; it is a sign of distress     The feedings may be 8-12 times per 24hrs and will not follow a schedule   Alternate the breast you start the feeding with, or start with the breast that feels the fullest   Switch breasts when the baby takes himself off the breast or falls asleep   Keep offering breasts until the baby looks full, no longer gives hunger signs, and stays asleep when placed on his back in the crib   If the baby is sleepy and wont wake for a feeding, put the baby skin-to-skin dressed in a diaper against the mothers bare chest   Sleep near your baby   The baby should be positioned and latched on to the breast correctly  o Chest-to-chest, chin in the breast  o Babys lips are flipped outward  o Babys mouth is stretched open wide like a shout  o Babys sucking should feel like tugging to the mother  - The baby should be drinking at the breast:  o You should hear swallowing or gulping throughout the feeding  o You should see milk on the babys lips when he comes off the breast  o Your breasts should be softer when the baby is finished feeding  o The baby should look relaxed at the end of feedings  o After  the 4th day and your milk is in:  o The babys poop should turn bright yellow and be loose, watery, and seedy  o The baby should have at least 3-4 poops the size of the palm of your hand per day  o The baby should have at least 6-8 wet diapers per day  o The urine should be light yellow in color  You should drink when you are thirsty and eat a healthy diet when you are    hungry.     Take naps to get the rest you need.   Take medications and/or drink alcohol only with permission of your obstetrician    or the babys pediatrician.  You can also call the [facility],   ([phone]), Monday-Friday, 8am-5pm Central time, to get the most   up-to-date evidence-based information on the use of medications during   pregnancy and breastfeeding.      The baby should be examined by a pediatrician at 3-5 days of age; unless ordered sooner by the pediatrician.  Once your milk comes in, the baby should be back to birth weight no later than 10-14 days of age.

## 2017-09-15 NOTE — SUBJECTIVE & OBJECTIVE
Hospital course: 2017 - patient admitted to L&D for PROM. No ctx, denies Vb. Good fm.  2017 - PPD#1 s/p . Patient is doing well this morning. Meeting postpartum goals (ambulating, tolerating regular diet, voiding spontaneously, passing flatus)  09/15/2017: Patient is PPD#2 s/p . Patient continue to do well and is meeting all postpartum milestones. Patient to be discharged this morning with 6 week follow up.     Interval History:     She is doing well this morning. She is tolerating a regular diet without nausea or vomiting. She is voiding spontaneously. She is ambulating. She has passed flatus, and has not a BM. Vaginal bleeding is mild. She denies fever or chills. Abdominal pain is mild and controlled with oral medications. She is breastfeeding and bottle feeding.    Objective:     Vital Signs (Most Recent):  Temp: 98.4 °F (36.9 °C) (17)  Pulse: 77 (17)  Resp: 18 (17)  BP: 128/67 (17)  SpO2: 99 % (17) Vital Signs (24h Range):  Temp:  [97.7 °F (36.5 °C)-98.4 °F (36.9 °C)] 98.4 °F (36.9 °C)  Pulse:  [75-82] 77  Resp:  [18] 18  SpO2:  [99 %] 99 %  BP: (110-128)/(63-67) 128/67     Weight: 85.1 kg (187 lb 9.8 oz)  Body mass index is 29.38 kg/m².      Intake/Output Summary (Last 24 hours) at 09/15/17 0617  Last data filed at 17 1400   Gross per 24 hour   Intake                0 ml   Output              600 ml   Net             -600 ml       Significant Labs:  Lab Results   Component Value Date    GROUPTRH O POS 2017    HEPBSAG Negative 2017    STREPBCULT No Group B Streptococcus isolated 08/15/2017    AFP 0.81 MoM (34.3 ng/mL) 2010       Recent Labs  Lab 17  0442   HGB 11.0*   HCT 32.4*       I have personallly reviewed all pertinent lab results from the last 24 hours.    Physical Exam

## 2017-09-18 PROBLEM — O42.90 PROM (PREMATURE RUPTURE OF MEMBRANES): Status: RESOLVED | Noted: 2017-09-18 | Resolved: 2017-09-13

## 2017-09-21 ENCOUNTER — HOSPITAL ENCOUNTER (INPATIENT)
Facility: OTHER | Age: 32
LOS: 2 days | Discharge: HOME | DRG: 776 | End: 2017-09-23
Attending: OBSTETRICS & GYNECOLOGY | Admitting: OBSTETRICS & GYNECOLOGY
Payer: COMMERCIAL

## 2017-09-21 DIAGNOSIS — N61.0 MASTITIS: ICD-10-CM

## 2017-09-21 DIAGNOSIS — D69.6 THROMBOCYTOPENIA: ICD-10-CM

## 2017-09-21 DIAGNOSIS — R51.9 HEADACHE, UNSPECIFIED HEADACHE TYPE: ICD-10-CM

## 2017-09-21 LAB
BASOPHILS # BLD AUTO: 0 K/UL
BASOPHILS NFR BLD: 0 %
DIFFERENTIAL METHOD: ABNORMAL
EOSINOPHIL # BLD AUTO: 0 K/UL
EOSINOPHIL NFR BLD: 0.5 %
ERYTHROCYTE [DISTWIDTH] IN BLOOD BY AUTOMATED COUNT: 12.9 %
HCT VFR BLD AUTO: 33.8 %
HGB BLD-MCNC: 11.7 G/DL
LYMPHOCYTES # BLD AUTO: 0.4 K/UL
LYMPHOCYTES NFR BLD: 6.6 %
MCH RBC QN AUTO: 33.7 PG
MCHC RBC AUTO-ENTMCNC: 34.6 G/DL
MCV RBC AUTO: 97 FL
MONOCYTES # BLD AUTO: 0.3 K/UL
MONOCYTES NFR BLD: 3.9 %
NEUTROPHILS # BLD AUTO: 5.7 K/UL
NEUTROPHILS NFR BLD: 88.4 %
PLATELET # BLD AUTO: 52 K/UL
PMV BLD AUTO: 12.6 FL
RBC # BLD AUTO: 3.47 M/UL
WBC # BLD AUTO: 6.48 K/UL

## 2017-09-21 PROCEDURE — 11000001 HC ACUTE MED/SURG PRIVATE ROOM

## 2017-09-21 PROCEDURE — 99284 EMERGENCY DEPT VISIT MOD MDM: CPT | Mod: ,,, | Performed by: OBSTETRICS & GYNECOLOGY

## 2017-09-21 PROCEDURE — 85025 COMPLETE CBC W/AUTO DIFF WBC: CPT

## 2017-09-21 PROCEDURE — 99223 1ST HOSP IP/OBS HIGH 75: CPT | Mod: ,,, | Performed by: OBSTETRICS & GYNECOLOGY

## 2017-09-21 PROCEDURE — 80053 COMPREHEN METABOLIC PANEL: CPT

## 2017-09-21 PROCEDURE — 99284 EMERGENCY DEPT VISIT MOD MDM: CPT | Mod: 25

## 2017-09-21 PROCEDURE — 96374 THER/PROPH/DIAG INJ IV PUSH: CPT

## 2017-09-21 PROCEDURE — 25000003 PHARM REV CODE 250: Performed by: STUDENT IN AN ORGANIZED HEALTH CARE EDUCATION/TRAINING PROGRAM

## 2017-09-21 PROCEDURE — 36415 COLL VENOUS BLD VENIPUNCTURE: CPT

## 2017-09-21 PROCEDURE — 63600175 PHARM REV CODE 636 W HCPCS: Performed by: STUDENT IN AN ORGANIZED HEALTH CARE EDUCATION/TRAINING PROGRAM

## 2017-09-21 RX ORDER — OXYCODONE AND ACETAMINOPHEN 5; 325 MG/1; MG/1
1 TABLET ORAL ONCE
Status: COMPLETED | OUTPATIENT
Start: 2017-09-21 | End: 2017-09-21

## 2017-09-21 RX ORDER — LABETALOL HYDROCHLORIDE 5 MG/ML
20 INJECTION, SOLUTION INTRAVENOUS ONCE
Status: COMPLETED | OUTPATIENT
Start: 2017-09-21 | End: 2017-09-21

## 2017-09-21 RX ORDER — LABETALOL HYDROCHLORIDE 5 MG/ML
40 INJECTION, SOLUTION INTRAVENOUS ONCE
Status: DISCONTINUED | OUTPATIENT
Start: 2017-09-21 | End: 2017-09-21 | Stop reason: SDUPTHER

## 2017-09-21 RX ORDER — IBUPROFEN 600 MG/1
600 TABLET ORAL EVERY 6 HOURS PRN
Status: DISCONTINUED | OUTPATIENT
Start: 2017-09-21 | End: 2017-09-23 | Stop reason: HOSPADM

## 2017-09-21 RX ORDER — MAGNESIUM SULFATE HEPTAHYDRATE 40 MG/ML
2 INJECTION, SOLUTION INTRAVENOUS ONCE
Status: COMPLETED | OUTPATIENT
Start: 2017-09-21 | End: 2017-09-21

## 2017-09-21 RX ORDER — MAGNESIUM SULFATE HEPTAHYDRATE 40 MG/ML
2 INJECTION, SOLUTION INTRAVENOUS CONTINUOUS
Status: DISCONTINUED | OUTPATIENT
Start: 2017-09-21 | End: 2017-09-22

## 2017-09-21 RX ORDER — HYDROCODONE BITARTRATE AND ACETAMINOPHEN 5; 325 MG/1; MG/1
1 TABLET ORAL EVERY 4 HOURS PRN
Status: DISCONTINUED | OUTPATIENT
Start: 2017-09-22 | End: 2017-09-22

## 2017-09-21 RX ORDER — CALCIUM GLUCONATE 98 MG/ML
1 INJECTION, SOLUTION INTRAVENOUS
Status: DISCONTINUED | OUTPATIENT
Start: 2017-09-21 | End: 2017-09-23 | Stop reason: HOSPADM

## 2017-09-21 RX ORDER — SODIUM CHLORIDE, SODIUM LACTATE, POTASSIUM CHLORIDE, CALCIUM CHLORIDE 600; 310; 30; 20 MG/100ML; MG/100ML; MG/100ML; MG/100ML
1000 INJECTION, SOLUTION INTRAVENOUS CONTINUOUS
Status: ACTIVE | OUTPATIENT
Start: 2017-09-21 | End: 2017-09-22

## 2017-09-21 RX ADMIN — MAGNESIUM SULFATE IN WATER 2 G: 40 INJECTION, SOLUTION INTRAVENOUS at 11:09

## 2017-09-21 RX ADMIN — LABETALOL HYDROCHLORIDE 20 MG: 5 INJECTION, SOLUTION INTRAVENOUS at 09:09

## 2017-09-21 RX ADMIN — OXYCODONE HYDROCHLORIDE AND ACETAMINOPHEN 1 TABLET: 5; 325 TABLET ORAL at 09:09

## 2017-09-21 RX ADMIN — SODIUM CHLORIDE, SODIUM LACTATE, POTASSIUM CHLORIDE, AND CALCIUM CHLORIDE 1000 ML: .6; .31; .03; .02 INJECTION, SOLUTION INTRAVENOUS at 11:09

## 2017-09-21 NOTE — Clinical Note
Maribel Valderrama should be admitted to antepartum for management of elevated severe range blood pressures, MgSO4 for neuroprophylaxis, and management of possible HELLP syndrome.

## 2017-09-22 LAB
ALBUMIN SERPL BCP-MCNC: 2.9 G/DL
ALP SERPL-CCNC: 92 U/L
ALT SERPL W/O P-5'-P-CCNC: 173 U/L
ANION GAP SERPL CALC-SCNC: 9 MMOL/L
AST SERPL-CCNC: 40 U/L
BASOPHILS # BLD AUTO: 0.01 K/UL
BASOPHILS NFR BLD: 0.1 %
BILIRUB SERPL-MCNC: 0.4 MG/DL
BUN SERPL-MCNC: 12 MG/DL
CALCIUM SERPL-MCNC: 8.7 MG/DL
CHLORIDE SERPL-SCNC: 107 MMOL/L
CO2 SERPL-SCNC: 21 MMOL/L
CREAT SERPL-MCNC: 0.7 MG/DL
CREAT UR-MCNC: 33.5 MG/DL
DIFFERENTIAL METHOD: ABNORMAL
EOSINOPHIL # BLD AUTO: 0 K/UL
EOSINOPHIL NFR BLD: 0.3 %
ERYTHROCYTE [DISTWIDTH] IN BLOOD BY AUTOMATED COUNT: 13 %
EST. GFR  (AFRICAN AMERICAN): >60 ML/MIN/1.73 M^2
EST. GFR  (NON AFRICAN AMERICAN): >60 ML/MIN/1.73 M^2
GLUCOSE SERPL-MCNC: 102 MG/DL
HCT VFR BLD AUTO: 36.8 %
HGB BLD-MCNC: 12.6 G/DL
LDH SERPL L TO P-CCNC: 250 U/L
LYMPHOCYTES # BLD AUTO: 1 K/UL
LYMPHOCYTES NFR BLD: 7.4 %
MCH RBC QN AUTO: 33.7 PG
MCHC RBC AUTO-ENTMCNC: 34.2 G/DL
MCV RBC AUTO: 98 FL
MONOCYTES # BLD AUTO: 0.6 K/UL
MONOCYTES NFR BLD: 4.6 %
NEUTROPHILS # BLD AUTO: 12 K/UL
NEUTROPHILS NFR BLD: 87.2 %
PLATELET # BLD AUTO: 286 K/UL
PMV BLD AUTO: 8.8 FL
POTASSIUM SERPL-SCNC: 3.4 MMOL/L
PROT SERPL-MCNC: 6.2 G/DL
PROT UR-MCNC: 12 MG/DL
PROT/CREAT RATIO, UR: 0.36
RBC # BLD AUTO: 3.74 M/UL
SODIUM SERPL-SCNC: 137 MMOL/L
WBC # BLD AUTO: 13.77 K/UL

## 2017-09-22 PROCEDURE — 99232 SBSQ HOSP IP/OBS MODERATE 35: CPT | Mod: 24,,, | Performed by: OBSTETRICS & GYNECOLOGY

## 2017-09-22 PROCEDURE — 25000003 PHARM REV CODE 250: Performed by: STUDENT IN AN ORGANIZED HEALTH CARE EDUCATION/TRAINING PROGRAM

## 2017-09-22 PROCEDURE — 83615 LACTATE (LD) (LDH) ENZYME: CPT

## 2017-09-22 PROCEDURE — 85025 COMPLETE CBC W/AUTO DIFF WBC: CPT

## 2017-09-22 PROCEDURE — 11000001 HC ACUTE MED/SURG PRIVATE ROOM

## 2017-09-22 PROCEDURE — 63600175 PHARM REV CODE 636 W HCPCS: Performed by: STUDENT IN AN ORGANIZED HEALTH CARE EDUCATION/TRAINING PROGRAM

## 2017-09-22 PROCEDURE — 36415 COLL VENOUS BLD VENIPUNCTURE: CPT

## 2017-09-22 PROCEDURE — 82570 ASSAY OF URINE CREATININE: CPT

## 2017-09-22 PROCEDURE — 25000003 PHARM REV CODE 250: Performed by: OBSTETRICS & GYNECOLOGY

## 2017-09-22 RX ORDER — DIPHENHYDRAMINE HCL 25 MG
25 CAPSULE ORAL EVERY 4 HOURS PRN
Status: DISCONTINUED | OUTPATIENT
Start: 2017-09-22 | End: 2017-09-23 | Stop reason: HOSPADM

## 2017-09-22 RX ORDER — IBUPROFEN 600 MG/1
600 TABLET ORAL EVERY 6 HOURS PRN
Status: DISCONTINUED | OUTPATIENT
Start: 2017-09-22 | End: 2017-09-22

## 2017-09-22 RX ORDER — OXYCODONE HYDROCHLORIDE 5 MG/1
5 TABLET ORAL EVERY 6 HOURS PRN
Status: DISCONTINUED | OUTPATIENT
Start: 2017-09-22 | End: 2017-09-23 | Stop reason: HOSPADM

## 2017-09-22 RX ORDER — PRENATAL WITH FERROUS FUM AND FOLIC ACID 3080; 920; 120; 400; 22; 1.84; 3; 20; 10; 1; 12; 200; 27; 25; 2 [IU]/1; [IU]/1; MG/1; [IU]/1; MG/1; MG/1; MG/1; MG/1; MG/1; MG/1; UG/1; MG/1; MG/1; MG/1; MG/1
1 TABLET ORAL DAILY
Status: DISCONTINUED | OUTPATIENT
Start: 2017-09-22 | End: 2017-09-23 | Stop reason: HOSPADM

## 2017-09-22 RX ORDER — DIPHENHYDRAMINE HYDROCHLORIDE 50 MG/ML
25 INJECTION INTRAMUSCULAR; INTRAVENOUS EVERY 4 HOURS PRN
Status: DISCONTINUED | OUTPATIENT
Start: 2017-09-22 | End: 2017-09-22

## 2017-09-22 RX ORDER — CEPHALEXIN 500 MG/1
500 CAPSULE ORAL EVERY 6 HOURS
Status: DISCONTINUED | OUTPATIENT
Start: 2017-09-22 | End: 2017-09-23 | Stop reason: HOSPADM

## 2017-09-22 RX ORDER — AMOXICILLIN 250 MG
1 CAPSULE ORAL NIGHTLY PRN
Status: DISCONTINUED | OUTPATIENT
Start: 2017-09-22 | End: 2017-09-23 | Stop reason: HOSPADM

## 2017-09-22 RX ORDER — ONDANSETRON 8 MG/1
8 TABLET, ORALLY DISINTEGRATING ORAL EVERY 8 HOURS PRN
Status: DISCONTINUED | OUTPATIENT
Start: 2017-09-22 | End: 2017-09-23 | Stop reason: HOSPADM

## 2017-09-22 RX ORDER — OXYCODONE AND ACETAMINOPHEN 5; 325 MG/1; MG/1
1 TABLET ORAL EVERY 4 HOURS PRN
Status: DISCONTINUED | OUTPATIENT
Start: 2017-09-22 | End: 2017-09-22

## 2017-09-22 RX ORDER — SODIUM CHLORIDE, SODIUM LACTATE, POTASSIUM CHLORIDE, CALCIUM CHLORIDE 600; 310; 30; 20 MG/100ML; MG/100ML; MG/100ML; MG/100ML
1000 INJECTION, SOLUTION INTRAVENOUS CONTINUOUS
Status: DISCONTINUED | OUTPATIENT
Start: 2017-09-21 | End: 2017-09-23

## 2017-09-22 RX ORDER — SIMETHICONE 80 MG
1 TABLET,CHEWABLE ORAL EVERY 6 HOURS PRN
Status: DISCONTINUED | OUTPATIENT
Start: 2017-09-22 | End: 2017-09-23 | Stop reason: HOSPADM

## 2017-09-22 RX ADMIN — PRENATAL VIT W/ FE FUMARATE-FA TAB 27-0.8 MG 1 EACH: 27-0.8 TAB at 08:09

## 2017-09-22 RX ADMIN — CEPHALEXIN 500 MG: 500 CAPSULE ORAL at 01:09

## 2017-09-22 RX ADMIN — IBUPROFEN 600 MG: 600 TABLET, FILM COATED ORAL at 08:09

## 2017-09-22 RX ADMIN — HYDROCODONE BITARTRATE AND ACETAMINOPHEN 1 TABLET: 5; 325 TABLET ORAL at 03:09

## 2017-09-22 RX ADMIN — CEPHALEXIN 500 MG: 500 CAPSULE ORAL at 08:09

## 2017-09-22 RX ADMIN — MAGNESIUM SULFATE IN WATER 2 G/HR: 40 INJECTION, SOLUTION INTRAVENOUS at 12:09

## 2017-09-22 RX ADMIN — SODIUM CHLORIDE, SODIUM LACTATE, POTASSIUM CHLORIDE, AND CALCIUM CHLORIDE 1000 ML: .6; .31; .03; .02 INJECTION, SOLUTION INTRAVENOUS at 09:09

## 2017-09-22 RX ADMIN — CEPHALEXIN 500 MG: 500 CAPSULE ORAL at 07:09

## 2017-09-22 NOTE — H&P
[facility]  Obstetrics & Gynecology  History & Physical    Patient Name: Maribel Valderrama  MRN: [id number]  Admission Date: 2017  Primary Care Provider: Primary Doctor No    Subjective:     Chief Complaint/Reason for Admission: New onset elevated blood pressures, thrombocytopenia    History of Present Illness:  Maribel Valderrama is a 32 y.o. [id number] who is PPD#8 s/p  presents complaining of pain and hardening in her right breast. She notes a temperature of 100.3 at home. She denies nausea, vomiting, has had a bowel movement two days ago, and denies dysuria or difficulty urinating.  Patient also notes headaches since the last two days. The headache has been intermittent, throbbing, in the right frontal head. She denies any vision changes, SOB, chest pain, increased extremity edema, or RUQ pain. She has no history of hypertension.  This IUP was complicated by h/o marginal previa now resolved, h/o bilateral ovarian cystectomy, benign right ovarian teratoma, and h/o missed AB.        Obstetric History       T2      L2     SAB0   TAB0   Ectopic0   Multiple0   Live Births2       # Outcome Date GA Lbr Zeus/2nd Weight Sex Delivery Anes PTL Lv   3 Term 17 39w2d / 00:22 3.65 kg (8 lb 0.8 oz) M Vag-Spont EPI N TONYA      Name: NINI VALDERRAMA      Apgar1:  9                Apgar5: 9   2 SAB / 18w5d 20:00 / 00:13 0.28 kg (9.9 oz) M Vag-Spont OTHER Y FD      Apgar1:  0                Apgar5: 0   1 Term 09/25/10 39w4d  3.697 kg (8 lb 2.4 oz) M Vag-Spont EPI  TONYA      Name: Elton        No past medical history on file.  Past Surgical History:   Procedure Laterality Date    Da Kory-assisted resection of complex left ovarian cyst x2 Left 2016    DONNA done at time of surgery -Dermoid and Hemorrhagic ovarian cyst    Ovarian Teratoma Removal Right 2010    15 weeks gestation, DERMOID RIGHT          (Not in a hospital admission)    Review of patient's allergies  indicates:  No Known Allergies     Family History     Problem Relation (Age of Onset)    Breast cancer Other    Cancer Other    Diabetes Maternal Uncle    Hypertension Mother, Father        Social History Main Topics    Smoking status: Never Smoker    Smokeless tobacco: Never Used    Alcohol use No    Drug use: No    Sexual activity: Yes     Partners: Male     Birth control/ protection: None      Comment:  to roxanne since 2009     Review of Systems   Constitutional: Positive for chills and fever.   Eyes: Negative for visual disturbance.   Respiratory: Negative for shortness of breath.    Cardiovascular: Negative for chest pain and leg swelling.   Gastrointestinal: Negative for abdominal pain, constipation, diarrhea, nausea and vomiting.   Genitourinary: Positive for vaginal bleeding (moderate). Negative for dysuria and vaginal discharge.   Neurological: Positive for headaches.      Objective:     Vital Signs (Most Recent):  Temp: (!) 100.6 °F (38.1 °C) (Dr. Kingston notified) (09/21/17 1946)  Pulse: 100 (09/21/17 2022)  Resp: 18 (09/21/17 2022)  BP: (!) 160/85 (09/21/17 2022)  SpO2: 99 % (09/21/17 1951) Vital Signs (24h Range):  Temp:  [100.6 °F (38.1 °C)] 100.6 °F (38.1 °C)  Pulse:  [] 100  Resp:  [18] 18  SpO2:  [99 %] 99 %  BP: (134-183)/(83-88) 160/85        There is no height or weight on file to calculate BMI.    Patient's last menstrual period was 12/12/2016 (exact date).    Physical Exam:   Constitutional: She is oriented to person, place, and time. She appears well-developed and well-nourished.    HENT:   Head: Normocephalic and atraumatic.    Eyes: EOM are normal.    Neck: Normal range of motion.    Cardiovascular: Normal rate and regular rhythm.    Murmur (systolic) heard.   Pulmonary/Chest: Effort normal and breath sounds normal. No respiratory distress. She has no wheezes. She has no rales. Right breast exhibits tenderness and swelling. Right breast exhibits no mass, no nipple discharge, no  skin change and no bleeding. Left breast exhibits nipple discharge. Left breast exhibits no skin change, no tenderness, no bleeding and no swelling. Breasts are symmetrical.            Abdominal: Soft. There is no tenderness. There is no rebound and no guarding.                 Neurological: She is alert and oriented to person, place, and time. She has normal reflexes.     Psychiatric: She has a normal mood and affect. Her behavior is normal. Judgment and thought content normal.       Laboratory:  CBC:   Recent Labs  Lab 09/21/17  2100   WBC 6.48   RBC 3.47*   HGB 11.7*   HCT 33.8*   PLT 52*   MCV 97   MCH 33.7*   MCHC 34.6     Assessment/Plan:     Mastitis    - febrile in [place] at 100.6, pain and tenderness in left breast  - keflex 500gm q6h  - will continue to monitor for signs of worsening infection  - pump provided for patient as her baby is not with her        Thrombocytopenia    - platelets 45 (down from baseline >200)        Postpartum hypertension    - severe range elevated blood pressures in KWABENA  - headache x 2 days, throbbing, right frontal, intermittent  - s/p labetalol 20 in KWABENA  - no h/o hypertension  - will admit for MgSO4 for seizure prophylaxis and further management of elevated blood pressures  - monitoring for mag toxicity  - CBC shows platelets of 54, down from her baseline of >200  - CMP, p/c ratio pending            Sushma K Reddy, MD  Obstetrics & Gynecology  [facility]    Patient seen and examined.  Agree with resident assessment and plan.  Presley Castillo Iv

## 2017-09-22 NOTE — ASSESSMENT & PLAN NOTE
- Pain and tenderness in left breast  - Tmax/last 9/21100.6 @ 1946   - Cont keflex 500gm q6h  - Continue monitoring  - Okay to continue pumping for baby

## 2017-09-22 NOTE — ASSESSMENT & PLAN NOTE
- severe range elevated blood pressures in KWABENA  - headache x 2 days, throbbing, right frontal, intermittent  - s/p labetalol 20 in KWABENA  - no h/o hypertension  - will admit for MgSO4 for neuroprophylaxis and further management of elevated blood pressures  - monitoring for mag toxicity  - CBC shows platelets of 54, down from her baseline of >200  - CMP, p/c ratio pending

## 2017-09-22 NOTE — PROGRESS NOTES
MAG NOTE     Maribel Valderrama is a 32 y.o.  POD#9 s/p , now with Pre-E with SF (HA/BP/low plt/elevated AST). Readmitted for magnesium for seizure prophylaxis.    Patient reports intermittent headaches no HA currently.  She denies blurry vision and denies right upper quadrant pain. denies CP, denies SOB. Patient reports no nausea/no vomiting.     Objective:       /76   Pulse 105   Temp 97.9 °F (36.6 °C) (Temporal)   Resp 18   LMP 2016 (Exact Date)   SpO2 100%   Breastfeeding? Yes   Vitals:    17 0620 17 0622 17 0627 17 0632   BP: 122/76      Pulse:  103 101 105   Resp:  18     Temp:       TempSrc:       SpO2:  100% 100% 100%         I/O last 3 completed shifts:  In: 1456.7 [P.O.:720; I.V.:736.7]  Out: 1400 [Urine:1400]    No intake/output data recorded.           General:   alert, appears stated age and cooperative   Lungs:   clear to auscultation bilaterally   Heart:   regular rate and rhythm, no S3 or S4 and systolic murmur   Abdomen:  soft, non-tender; bowel sounds normal; no masses,  no organomegaly   Extremities: peripheral pulses normal, no pedal edema, no clubbing or cyanosis   Reflexes - 2+  bilaterally     Lab Review  Recent Results (from the past 48 hour(s))   CBC auto differential    Collection Time: 17  9:00 PM   Result Value Ref Range    WBC 6.48 3.90 - 12.70 K/uL    RBC 3.47 (L) 4.00 - 5.40 M/uL    Hemoglobin 11.7 (L) 12.0 - 16.0 g/dL    Hematocrit 33.8 (L) 37.0 - 48.5 %    MCV 97 82 - 98 fL    MCH 33.7 (H) 27.0 - 31.0 pg    MCHC 34.6 32.0 - 36.0 g/dL    RDW 12.9 11.5 - 14.5 %    Platelets 52 (L) 150 - 350 K/uL    MPV 12.6 9.2 - 12.9 fL    Gran # 5.7 1.8 - 7.7 K/uL    Lymph # 0.4 (L) 1.0 - 4.8 K/uL    Mono # 0.3 0.3 - 1.0 K/uL    Eos # 0.0 0.0 - 0.5 K/uL    Baso # 0.00 0.00 - 0.20 K/uL    Gran% 88.4 (H) 38.0 - 73.0 %    Lymph% 6.6 (L) 18.0 - 48.0 %    Mono% 3.9 (L) 4.0 - 15.0 %    Eosinophil% 0.5 0.0 - 8.0 %    Basophil% 0.0 0.0 - 1.9 %     Differential Method Automated    Comprehensive metabolic panel    Collection Time: 17 11:51 PM   Result Value Ref Range    Sodium 137 136 - 145 mmol/L    Potassium 3.4 (L) 3.5 - 5.1 mmol/L    Chloride 107 95 - 110 mmol/L    CO2 21 (L) 23 - 29 mmol/L    Glucose 102 70 - 110 mg/dL    BUN, Bld 12 6 - 20 mg/dL    Creatinine 0.7 0.5 - 1.4 mg/dL    Calcium 8.7 8.7 - 10.5 mg/dL    Total Protein 6.2 6.0 - 8.4 g/dL    Albumin 2.9 (L) 3.5 - 5.2 g/dL    Total Bilirubin 0.4 0.1 - 1.0 mg/dL    Alkaline Phosphatase 92 55 - 135 U/L    AST 40 10 - 40 U/L     (H) 10 - 44 U/L    Anion Gap 9 8 - 16 mmol/L    eGFR if African American >60 >60 mL/min/1.73 m^2    eGFR if non African American >60 >60 mL/min/1.73 m^2   Protein / creatinine ratio, urine    Collection Time: 17 12:30 AM   Result Value Ref Range    Protein, Urine Random 12 0 - 15 mg/dL    Creatinine, Random Ur 33.5 15.0 - 325.0 mg/dL    Prot/Creat Ratio, Ur 0.36 (H) 0.00 - 0.20   Lactate dehydrogenase    Collection Time: 17  2:07 AM   Result Value Ref Range     110 - 260 U/L          Assessment:     32 y.o.  female , on magnesium sulfate    Active Hospital Problems    Diagnosis  POA    Pre-eclampsia in postpartum period [O14.95]  Unknown    Thrombocytopenia [D69.6]  Unknown    Mastitis [N61.0]  Unknown      Resolved Hospital Problems    Diagnosis Date Resolved POA   No resolved problems to display.        Plan:   1. Continue magnesium sulfate, no signs of toxicity at this time  2. Monitor for s/s of eclampsia  3. Close maternal monitoring including UOP and BP   - UOP > 200 cc/hr   - BP: (118-183)/(62-93) 122/76  4. Recheck in 4 hours or PRN    Ofelia Bautista MD  Ob/Gyn PGY-2

## 2017-09-22 NOTE — ASSESSMENT & PLAN NOTE
- severe range elevated blood pressures in KWABENA  - s/p labetalol 20 in KWABENA  - BP WNL overnight, BP: (118-183)/(62-93) 122/76  - PreE labs   - Plts 45   - AST/ALT 40/173   - Cr 0.7   - P:C 0.36  - MgSO4 started 2315 9/21  - Continue for 24 hours  - No s/sxs of toxicity

## 2017-09-22 NOTE — PROGRESS NOTES
MAG NOTE     Maribel Valderrama is a 32 y.o.  POD#9 s/p , now with Pre-E with SF (HA/BP/low plt/elevated AST). Readmitted for magnesium for seizure prophylaxis.    Patient reports headaches which are improving, now 1-2/10, denies blurry vision and denies right upper quadrant pain. denies CP, denies SOB. Patient reports no nausea/no vomiting.     Objective:       /74   Pulse 110   Temp 97.9 °F (36.6 °C) (Temporal)   Resp 18   LMP 2016 (Exact Date)   SpO2 98%   Breastfeeding? Yes   Vitals:    17 0045 17 0158 17 0208 17 0305   BP: 127/70 128/63  129/74   Pulse: 96  106 110   Resp:   18 18   Temp:    97.9 °F (36.6 °C)   TempSrc:    Temporal   SpO2: 97%  98% 98%         No intake/output data recorded.    I/O this shift:  In: -   Out: 400 [Urine:400]           General:   alert, appears stated age and cooperative   Lungs:   clear to auscultation bilaterally   Heart:   regular rate and rhythm, no S3 or S4 and systolic murmur   Abdomen:  soft, non-tender; bowel sounds normal; no masses,  no organomegaly   Extremities: peripheral pulses normal, no pedal edema, no clubbing or cyanosis   Reflexes - 2+  bilaterally     Lab Review  Recent Results (from the past 48 hour(s))   CBC auto differential    Collection Time: 17  9:00 PM   Result Value Ref Range    WBC 6.48 3.90 - 12.70 K/uL    RBC 3.47 (L) 4.00 - 5.40 M/uL    Hemoglobin 11.7 (L) 12.0 - 16.0 g/dL    Hematocrit 33.8 (L) 37.0 - 48.5 %    MCV 97 82 - 98 fL    MCH 33.7 (H) 27.0 - 31.0 pg    MCHC 34.6 32.0 - 36.0 g/dL    RDW 12.9 11.5 - 14.5 %    Platelets 52 (L) 150 - 350 K/uL    MPV 12.6 9.2 - 12.9 fL    Gran # 5.7 1.8 - 7.7 K/uL    Lymph # 0.4 (L) 1.0 - 4.8 K/uL    Mono # 0.3 0.3 - 1.0 K/uL    Eos # 0.0 0.0 - 0.5 K/uL    Baso # 0.00 0.00 - 0.20 K/uL    Gran% 88.4 (H) 38.0 - 73.0 %    Lymph% 6.6 (L) 18.0 - 48.0 %    Mono% 3.9 (L) 4.0 - 15.0 %    Eosinophil% 0.5 0.0 - 8.0 %    Basophil% 0.0 0.0 - 1.9 %     Differential Method Automated    Comprehensive metabolic panel    Collection Time: 17 11:51 PM   Result Value Ref Range    Sodium 137 136 - 145 mmol/L    Potassium 3.4 (L) 3.5 - 5.1 mmol/L    Chloride 107 95 - 110 mmol/L    CO2 21 (L) 23 - 29 mmol/L    Glucose 102 70 - 110 mg/dL    BUN, Bld 12 6 - 20 mg/dL    Creatinine 0.7 0.5 - 1.4 mg/dL    Calcium 8.7 8.7 - 10.5 mg/dL    Total Protein 6.2 6.0 - 8.4 g/dL    Albumin 2.9 (L) 3.5 - 5.2 g/dL    Total Bilirubin 0.4 0.1 - 1.0 mg/dL    Alkaline Phosphatase 92 55 - 135 U/L    AST 40 10 - 40 U/L     (H) 10 - 44 U/L    Anion Gap 9 8 - 16 mmol/L    eGFR if African American >60 >60 mL/min/1.73 m^2    eGFR if non African American >60 >60 mL/min/1.73 m^2   Protein / creatinine ratio, urine    Collection Time: 17 12:30 AM   Result Value Ref Range    Protein, Urine Random 12 0 - 15 mg/dL    Creatinine, Random Ur 33.5 15.0 - 325.0 mg/dL    Prot/Creat Ratio, Ur 0.36 (H) 0.00 - 0.20   Lactate dehydrogenase    Collection Time: 17  2:07 AM   Result Value Ref Range     110 - 260 U/L          Assessment:     32 y.o.  female , on magnesium sulfate    Active Hospital Problems    Diagnosis  POA    Postpartum hypertension [O16.5]  Yes    Thrombocytopenia [D69.6]  Unknown    Mastitis [N61.0]  Unknown      Resolved Hospital Problems    Diagnosis Date Resolved POA   No resolved problems to display.        Plan:   1. Continue magnesium sulfate, no signs of toxicity at this time  2. Monitor for s/s of eclampsia  3. Close maternal monitoring including UOP and BP  - last UOP @0200 - 400cc  - BP: (121-183)/(63-93) 129/74 - blood pressures in normal range since admission. S/p labetalol 20 x 1.  4. Recheck in 4 hours or PRN  5. . Patient does not have HELLP syndrome. Will continue magnesium for seizure prophylaxis.

## 2017-09-22 NOTE — ASSESSMENT & PLAN NOTE
- severe range elevated blood pressures in KWABENA  - s/p labetalol 20 in KWABENA  - no h/o hypertension  - PreE labs   - Plts 45   - AST/ALT 40/173   - Cr 0.7   - P:C 0.36  - MgSO4 started 2315 9/21  - Continue for 24 hours  - No s/sxs of toxicity

## 2017-09-22 NOTE — SUBJECTIVE & OBJECTIVE
Obstetric History       T2      L2     SAB0   TAB0   Ectopic0   Multiple0   Live Births2       # Outcome Date GA Lbr Zeus/2nd Weight Sex Delivery Anes PTL Lv   3 Term 17 39w2d / 00:22 3.65 kg (8 lb 0.8 oz) M Vag-Spont EPI N TONYA      Name: NINI GUTIERREZ      Apgar1:  9                Apgar5: 9   2 SAB 16 18w5d 20:00 / 00:13 0.28 kg (9.9 oz) M Vag-Spont OTHER Y FD      Apgar1:  0                Apgar5: 0   1 Term 09/25/10 39w4d  3.697 kg (8 lb 2.4 oz) M Vag-Spont EPI  TONYA      Name: Elton        No past medical history on file.  Past Surgical History:   Procedure Laterality Date    Da Kory-assisted resection of complex left ovarian cyst x2 Left 2016    DONNA done at time of surgery -Dermoid and Hemorrhagic ovarian cyst    Ovarian Teratoma Removal Right 2010    15 weeks gestation, DERMOID RIGHT          (Not in a hospital admission)    Review of patient's allergies indicates:  No Known Allergies     Family History     Problem Relation (Age of Onset)    Breast cancer Other    Cancer Other    Diabetes Maternal Uncle    Hypertension Mother, Father        Social History Main Topics    Smoking status: Never Smoker    Smokeless tobacco: Never Used    Alcohol use No    Drug use: No    Sexual activity: Yes     Partners: Male     Birth control/ protection: None      Comment:  to roxanne since      Review of Systems   Constitutional: Positive for chills and fever.   Eyes: Negative for visual disturbance.   Respiratory: Negative for shortness of breath.    Cardiovascular: Negative for chest pain and leg swelling.   Gastrointestinal: Negative for abdominal pain, constipation, diarrhea, nausea and vomiting.   Genitourinary: Positive for vaginal bleeding (moderate). Negative for dysuria and vaginal discharge.   Neurological: Positive for headaches.      Objective:     Vital Signs (Most Recent):  Temp: (!) 100.6 °F (38.1 °C) (Dr. Kingston notified) (17)  Pulse: 100  (09/21/17 2022)  Resp: 18 (09/21/17 2022)  BP: (!) 160/85 (09/21/17 2022)  SpO2: 99 % (09/21/17 1951) Vital Signs (24h Range):  Temp:  [100.6 °F (38.1 °C)] 100.6 °F (38.1 °C)  Pulse:  [] 100  Resp:  [18] 18  SpO2:  [99 %] 99 %  BP: (134-183)/(83-88) 160/85        There is no height or weight on file to calculate BMI.    Patient's last menstrual period was 12/12/2016 (exact date).    Physical Exam:   Constitutional: She is oriented to person, place, and time. She appears well-developed and well-nourished.    HENT:   Head: Normocephalic and atraumatic.    Eyes: EOM are normal.    Neck: Normal range of motion.    Cardiovascular: Normal rate and regular rhythm.    Murmur (systolic) heard.   Pulmonary/Chest: Effort normal and breath sounds normal. No respiratory distress. She has no wheezes. She has no rales. Right breast exhibits tenderness and swelling. Right breast exhibits no mass, no nipple discharge, no skin change and no bleeding. Left breast exhibits nipple discharge. Left breast exhibits no skin change, no tenderness, no bleeding and no swelling. Breasts are symmetrical.            Abdominal: Soft. There is no tenderness. There is no rebound and no guarding.                 Neurological: She is alert and oriented to person, place, and time. She has normal reflexes.     Psychiatric: She has a normal mood and affect. Her behavior is normal. Judgment and thought content normal.       Laboratory:  CBC:   Recent Labs  Lab 09/21/17  2100   WBC 6.48   RBC 3.47*   HGB 11.7*   HCT 33.8*   PLT 52*   MCV 97   MCH 33.7*   MCHC 34.6

## 2017-09-22 NOTE — SUBJECTIVE & OBJECTIVE
Interval History:   Patient doing well this AM. She reports feeling better this AM. She denies current headache, denies RUQ pain, denies visual changes, denies SOB.  She reports right breast continues to be tender to palpation and warm to touch. She is pumping for infant.     Scheduled Meds:   cephALEXin  500 mg Oral Q6H    prenatal vitamin  1 tablet Oral Daily     Continuous Infusions:   lactated Ringers 1,000 mL (09/21/17 2312)    magnesium sulfate in water 2 g/hr (09/22/17 0004)     PRN Meds:calcium gluconate, diphenhydrAMINE, diphenhydrAMINE, hydrocodone-acetaminophen 5-325mg, ibuprofen, ondansetron, promethazine (PHENERGAN) IVPB, senna-docusate 8.6-50 mg, simethicone    Review of patient's allergies indicates:  No Known Allergies    Objective:     Vital Signs (Most Recent):  Temp: 97.9 °F (36.6 °C) (09/22/17 0305)  Pulse: 99 (09/22/17 0515)  Resp: 18 (09/22/17 0420)  BP: 118/68 (09/22/17 0520)  SpO2: 96 % (09/22/17 0515) Vital Signs (24h Range):  Temp:  [97.3 °F (36.3 °C)-100.6 °F (38.1 °C)] 97.9 °F (36.6 °C)  Pulse:  [] 99  Resp:  [18] 18  SpO2:  [96 %-99 %] 96 %  BP: (118-183)/(62-93) 118/68        There is no height or weight on file to calculate BMI.  Patient's last menstrual period was 12/12/2016 (exact date).    I&O (Last 24H):    No intake/output data recorded.  I/O this shift:  In: 1456.7 [P.O.:720; I.V.:736.7]  Out: 1400 [Urine:1400]      Laboratory:    Recent Labs  Lab 09/21/17  2100   WBC 6.48   HGB 11.7*   HCT 33.8*   MCV 97   PLT 52*        Recent Labs  Lab 09/21/17  2351      K 3.4*      CO2 21*   BUN 12   CREATININE 0.7      PROT 6.2   BILITOT 0.4   ALKPHOS 92   *   AST 40        P:C 0.36        Physical Exam:   Constitutional: She is oriented to person, place, and time. She appears well-developed and well-nourished.    HENT:   Head: Normocephalic and atraumatic.    Eyes: EOM are normal.    Neck: Normal range of motion.    Cardiovascular: Normal rate and  regular rhythm.     Pulmonary/Chest: Effort normal and breath sounds normal. No respiratory distress. She has no wheezes. She has no rales. Right breast exhibits tenderness and swelling (warmth). Right breast exhibits no mass, no nipple discharge, no skin change (lactating) and no bleeding. Left breast exhibits no skin change, no tenderness, no bleeding and no swelling. Breasts are symmetrical.            Abdominal: Soft. There is no tenderness. There is no rebound and no guarding.                 Neurological: She is alert and oriented to person, place, and time. She has normal reflexes.   2+ DTRs     Psychiatric: She has a normal mood and affect. Her behavior is normal. Judgment and thought content normal.

## 2017-09-22 NOTE — HPI
Maribel Valderrama is a 32 y.o. [id number] who is PPD#8 s/p  presents complaining of pain and hardening in her right breast. She notes a temperature of 100.3 at home. She denies nausea, vomiting, has had a bowel movement two days ago, and denies dysuria or difficulty urinating.  Patient also notes headaches since the last two days. The headache has been intermittent, throbbing, in the right frontal head. She denies any vision changes, SOB, chest pain, increased extremity edema, or RUQ pain. She has no history of hypertension.  This IUP was complicated by h/o marginal previa now resolved, h/o bilateral ovarian cystectomy, benign right ovarian teratoma, and h/o missed AB.

## 2017-09-22 NOTE — ASSESSMENT & PLAN NOTE
- febrile in [place]ine at 100.6, pain and tenderness in left breast  - keflex 500gm q6h  - will continue to monitor for signs of worsening infection  - pump provided for patient as her baby is not with her

## 2017-09-22 NOTE — PLAN OF CARE
Problem: Patient Care Overview  Goal: Plan of Care Review  Outcome: Ongoing (interventions implemented as appropriate)  Pt.reports headaches which are improving, now 1-2/10. Denies blurry vision, right upper quadrant pain, or SOB. Patient reports no N/V. VSS throughout the night. Urine output is adequate. POC reviewed with patient and any questions or concerns were answered at this time. Pt. Was taught how to use breast pump, how often to pump, and how to clean parts.Pt. Verbalized understanding and doing well with pumping. Remains safe and free from falls with bed in lowest position, side rails up x 2, and call light in reach. Will continue to monitor.

## 2017-09-22 NOTE — LACTATION NOTE
Lactation rounds. Breasts soft with short, everted, intact nipples bilaterally. Right breast with firmer, warmer, slightly pink area noted to outer side of right breast. Patient reports it has improved much since admit.   Using Symphony breast pump while in hospital, and just completed pumping, collecting total of 25 mL. Reviewed proper pump use, cleaning of parts, labeling and storage of milk. Explained importance of pumping frequently and recommended pumping both breasts for 15-20 minutes 8-10 times per 24 hour period, and no longer than 3 hours between pumpings at this time.   Patient reports that prior to admit, she had been breastfeeding baby and supplementing with formula. States latch has been painful, and reports she does not have a pump at home. Recommended [employer]'s latch video for reference, and reviewed proper positioning and latch. Explained importance of adequate latch to achieve adequate breast drainage. Explained importance of frequent, adequate breast drainage to establish and maintain adequate milk supply as well as to prevent plugged ducts and mastitis. Recommended working on improving baby's latch, once home with baby, and if unable to achieve adequate latch, to pump and bottlefeed expressed milk while working to improve feedings. Discussed options for obtaining a pump, and patient reports she has been working on getting a pump through insurance. Informed of rental option, if needed. Encouraged to contact lactation as needed for assistance or with questions/concerns. Voices understanding.     09/22/17 1015   Maternal Infant Assessment   Breast Shape round   Breast Density (see note)   Areola elastic   Nipple(s) everted  (short)   Breast Signs/Symptoms of Infection (see note)   Equipment Type/Education   Pump Type Symphony   Breast Pump Type double electric, hospital grade   Breast Pump Flange Type hard   Breast Pump Flange Size 27 mm  (used 24 previously, recommended 27 )   Breast  Pumping Bilateral Breasts:   Pumping Frequency (times) (8+ times/24 hours, not to exceed 3 hrs between pumpings)

## 2017-09-22 NOTE — HOSPITAL COURSE
09/21/2017 - patient presented to [facility] with right sided breast pain and headache. Severe range blood pressures noted, 20mg of labetalol pushed. Platelets noted to be 54. Patient admitted, will give MgSO4 for neuroprophylaxis.  09/22/2017- No acute events overnight. No PreE symptoms this AM. Reports she is feeling better. Breast exam improving. Repeat labs show platelets of 286 (prior result likely lab error). MgSO4 stopped after 24h  09/23/2017 - Doing well, remains afebrile. Neg PreE ROS. BP normal range on no meds. ALT decreasing, no RUQ pain. Ready for DC home with 1 week BP check and Rx for abx.

## 2017-09-22 NOTE — PROGRESS NOTES
MAG NOTE     Maribel Valderrama is a 32 y.o.  POD#9 s/p , now with Pre-E with SF (HA/BP/low plt/elevated AST). Readmitted for magnesium for seizure prophylaxis.    Patient reports intermittent headache 1-2/10.  She denies blurry vision and denies right upper quadrant pain. denies CP, denies SOB. Patient reports no nausea/no vomiting.     Objective:       /76   Pulse 105   Temp 97.9 °F (36.6 °C) (Temporal)   Resp 18   LMP 2016 (Exact Date)   SpO2 100%   Breastfeeding? Yes   Vitals:    17 0620 17 0622 17 0627 17 0632   BP: 122/76      Pulse:  103 101 105   Resp:  18     Temp:       TempSrc:       SpO2:  100% 100% 100%         I/O last 3 completed shifts:  In: 1456.7 [P.O.:720; I.V.:736.7]  Out: 1400 [Urine:1400]    I/O this shift:  In: 1290 [P.O.:640; I.V.:650]  Out: -       Date 17 0700 - 17 0659   Shift 6005-6962 0535-7725 8156-8889 24 Hour Total   I  N  T  A  K  E   P.O. 640   640    I.V. 650   650    Shift Total 1290   1290   O  U  T  P  U  T   Shift Total       Weight (kg)             General:   alert, appears stated age and cooperative   Lungs:   clear to auscultation bilaterally   Heart:   regular rate and rhythm, no S3 or S4 and systolic murmur   Abdomen:  soft, non-tender; bowel sounds normal; no masses,  no organomegaly   Extremities: peripheral pulses normal, no pedal edema, no clubbing or cyanosis   Reflexes - 2+  bilaterally     Lab Review  Recent Results (from the past 48 hour(s))   CBC auto differential    Collection Time: 17  9:00 PM   Result Value Ref Range    WBC 6.48 3.90 - 12.70 K/uL    RBC 3.47 (L) 4.00 - 5.40 M/uL    Hemoglobin 11.7 (L) 12.0 - 16.0 g/dL    Hematocrit 33.8 (L) 37.0 - 48.5 %    MCV 97 82 - 98 fL    MCH 33.7 (H) 27.0 - 31.0 pg    MCHC 34.6 32.0 - 36.0 g/dL    RDW 12.9 11.5 - 14.5 %    Platelets 52 (L) 150 - 350 K/uL    MPV 12.6 9.2 - 12.9 fL    Gran # 5.7 1.8 - 7.7 K/uL    Lymph # 0.4 (L) 1.0 - 4.8 K/uL     Mono # 0.3 0.3 - 1.0 K/uL    Eos # 0.0 0.0 - 0.5 K/uL    Baso # 0.00 0.00 - 0.20 K/uL    Gran% 88.4 (H) 38.0 - 73.0 %    Lymph% 6.6 (L) 18.0 - 48.0 %    Mono% 3.9 (L) 4.0 - 15.0 %    Eosinophil% 0.5 0.0 - 8.0 %    Basophil% 0.0 0.0 - 1.9 %    Differential Method Automated    Comprehensive metabolic panel    Collection Time: 17 11:51 PM   Result Value Ref Range    Sodium 137 136 - 145 mmol/L    Potassium 3.4 (L) 3.5 - 5.1 mmol/L    Chloride 107 95 - 110 mmol/L    CO2 21 (L) 23 - 29 mmol/L    Glucose 102 70 - 110 mg/dL    BUN, Bld 12 6 - 20 mg/dL    Creatinine 0.7 0.5 - 1.4 mg/dL    Calcium 8.7 8.7 - 10.5 mg/dL    Total Protein 6.2 6.0 - 8.4 g/dL    Albumin 2.9 (L) 3.5 - 5.2 g/dL    Total Bilirubin 0.4 0.1 - 1.0 mg/dL    Alkaline Phosphatase 92 55 - 135 U/L    AST 40 10 - 40 U/L     (H) 10 - 44 U/L    Anion Gap 9 8 - 16 mmol/L    eGFR if African American >60 >60 mL/min/1.73 m^2    eGFR if non African American >60 >60 mL/min/1.73 m^2   Protein / creatinine ratio, urine    Collection Time: 17 12:30 AM   Result Value Ref Range    Protein, Urine Random 12 0 - 15 mg/dL    Creatinine, Random Ur 33.5 15.0 - 325.0 mg/dL    Prot/Creat Ratio, Ur 0.36 (H) 0.00 - 0.20   Lactate dehydrogenase    Collection Time: 17  2:07 AM   Result Value Ref Range     110 - 260 U/L          Assessment:     32 y.o.  female , on magnesium sulfate    Active Hospital Problems    Diagnosis  POA    Pre-eclampsia in postpartum period [O14.95]  Unknown    Thrombocytopenia [D69.6]  Unknown    Mastitis [N61.0]  Unknown      Resolved Hospital Problems    Diagnosis Date Resolved POA   No resolved problems to display.        Plan:   1. Continue magnesium sulfate, no signs of toxicity at this time  2. Monitor for s/s of eclampsia  3. Close maternal monitoring including UOP and BP   - UOP > 150 cc/hr   - BP: (118-183)/(62-93) 122/76  4. Recheck in 4 hours or PRN    Ofelia Bautista MD  Ob/Gyn PGY-2

## 2017-09-22 NOTE — ED PROVIDER NOTES
Encounter Date: 2017       History     Chief Complaint   Patient presents with    Fever    Chills    Hypertension    Breast Pain     Maribel Valderrama is a 32 y.o. [id number] who is PPD#8 s/p  presents complaining of pain and hardening in her right breast. She notes a temperature of 100.3 at home. She denies nausea, vomiting, has had a bowel movement two days ago, and denies dysuria or difficulty urinating.  Patient also notes headaches since the last two days. The headache has been intermittent, throbbing, in the right frontal head. She denies any vision changes, SOB, chest pain, increased extremity edema, or RUQ pain. She has no history of hypertension.  This IUP was complicated by h/o marginal previa now resolved, h/o bilateral ovarian cystectomy, benign right ovarian teratoma, and h/o missed AB.          Review of patient's allergies indicates:  No Known Allergies  No past medical history on file.  Past Surgical History:   Procedure Laterality Date    Da Kory-assisted resection of complex left ovarian cyst x2 Left 2016    DONNA done at time of surgery -Dermoid and Hemorrhagic ovarian cyst    Ovarian Teratoma Removal Right 2010    15 weeks gestation, DERMOID RIGHT      Family History   Problem Relation Age of Onset    Hypertension Mother     Hypertension Father     Breast cancer Other     Cancer Other      cervical cancer    Diabetes Maternal Uncle     Ovarian cancer Neg Hx     Stroke Neg Hx     Colon cancer Neg Hx     Celiac disease Neg Hx     Cirrhosis Neg Hx     Colon polyps Neg Hx     Crohn's disease Neg Hx     Esophageal cancer Neg Hx     Inflammatory bowel disease Neg Hx     Liver cancer Neg Hx     Liver disease Neg Hx     Rectal cancer Neg Hx     Stomach cancer Neg Hx     Ulcerative colitis Neg Hx      Social History   Substance Use Topics    Smoking status: Never Smoker    Smokeless tobacco: Never Used    Alcohol use No     Review of Systems   Eyes: Negative  for visual disturbance.   Respiratory: Negative for shortness of breath.    Cardiovascular: Negative for chest pain and leg swelling.   Gastrointestinal: Negative for abdominal pain.   Genitourinary:        Right-sided breast pain and tenderness (see hpi)   Neurological: Positive for headaches.       Physical Exam     Initial Vitals   BP Pulse Resp Temp SpO2   174 17   134/83 (!) 123 18 (!) 100.6 °F (38.1 °C) 99 %      MAP       17       100         Physical Exam    Vitals reviewed.  Constitutional: She appears well-developed and well-nourished. She is not diaphoretic. No distress.   HENT:   Head: Normocephalic and atraumatic.   Eyes: EOM are normal.   Neck: Normal range of motion. Neck supple.   Cardiovascular: Normal rate, regular rhythm and normal heart sounds.   Pulmonary/Chest: Breath sounds normal. No respiratory distress. She has no wheezes. She has no rhonchi. She has no rales. Right breast exhibits no nipple discharge. Left breast exhibits nipple discharge.       Breast exam:  Left breast: no skin changes, swelling, edema, or tenderness to palpation noted.  Right breast: no skin changes noted. + edema and tenderness to palpation of outer lower right breast.   Abdominal: Soft. There is no tenderness. There is no rebound and no guarding.   Neurological: She is alert and oriented to person, place, and time. She has normal reflexes.   Skin: Skin is warm and dry.   Psychiatric: She has a normal mood and affect. Her behavior is normal. Judgment and thought content normal.         ED Course   Procedures  Labs Reviewed - No data to display          Medical Decision Making:   Initial Assessment:   33 yo  at 39w2d presents with right-sided breast pain and fever as well as elevated blood pressures and headaches.  Differential Diagnosis:   Pre-E w/ Sf, HELLP, mastitis  ED Management:  Right sided breast pain and febrile. Will start  antibiotics (keflex 500mg qid).  Percocet and heat pack for breast.    Severe range elevated blood pressures up to 180's systolic. 20 of labetalol pushed.  Patient complaining of headache for last 2 days.  CBC, CMP, P/C ratio taken.  Platelets 54.     MgSO4 started.  Will admit for magnesium and management of elevated blood pressures.    Other:   I have discussed this case with another health care provider.              Attending Attestation:   Physician Attestation Statement for Resident:  As the supervising MD   Physician Attestation Statement: I have personally seen and examined this patient.   I agree with the above history. -:   As the supervising MD I agree with the above PE.    As the supervising MD I agree with the above treatment, course, plan, and disposition.  I was personally present during the critical portions of the procedure(s) performed by the resident and was immediately available in the ED to provide services and assistance as needed during the entire procedure.  I have reviewed and agree with the residents interpretation of the following: lab data.  I have reviewed the following: old records at this facility.                    ED Course as of Sep 21 2154   Thu Sep 21, 2017   2037 I evaluated Ms. Valderrama and discussed plan with Dr. So.  Ms. Valderrama presents with right breast pain with elevated tempt 100.3 at home (100.6 here) no body aches or flu sx's, HA 6/10 on and off over last couple of days w minimal relief from tylenol, and elevated BP at home.  Breast exam shows tenderness to palpation 8 oclock right breast, no warmth or redness, no evidence of abscess.  BP's elevated in severe range x 3, will give IV labetalol and get pre-e labs.  Pt's breast are full and leaking, so will get pump.  Warm compresses and percocet for breast pain.  Plain discussed with pt who is in agreement  [HU]   2152 After labetalol 20mg, at 2118, BP still severe range at 160/85.  Platelets 52.  Starting magnesium now,  will move towards admission for HELLP syndrome  [HU]      ED Course User Index  [HU] Cherise aTm DO     Clinical Impression:   The primary encounter diagnosis was Postpartum hypertension. Diagnoses of Headache, unspecified headache type and Thrombocytopenia were also pertinent to this visit.    Disposition:   Disposition: Admitted  Condition: Stable                        Cherise Tam DO  09/30/17 1233

## 2017-09-23 VITALS
RESPIRATION RATE: 16 BRPM | TEMPERATURE: 97 F | DIASTOLIC BLOOD PRESSURE: 77 MMHG | SYSTOLIC BLOOD PRESSURE: 136 MMHG | HEART RATE: 81 BPM | OXYGEN SATURATION: 98 %

## 2017-09-23 PROBLEM — D69.6 THROMBOCYTOPENIA: Status: RESOLVED | Noted: 2017-09-21 | Resolved: 2017-09-23

## 2017-09-23 LAB
ALBUMIN SERPL BCP-MCNC: 2.7 G/DL
ALP SERPL-CCNC: 128 U/L
ALT SERPL W/O P-5'-P-CCNC: 112 U/L
ANION GAP SERPL CALC-SCNC: 5 MMOL/L
AST SERPL-CCNC: 22 U/L
BILIRUB SERPL-MCNC: 0.4 MG/DL
BUN SERPL-MCNC: 9 MG/DL
CALCIUM SERPL-MCNC: 8.2 MG/DL
CHLORIDE SERPL-SCNC: 111 MMOL/L
CO2 SERPL-SCNC: 23 MMOL/L
CREAT SERPL-MCNC: 0.6 MG/DL
EST. GFR  (AFRICAN AMERICAN): >60 ML/MIN/1.73 M^2
EST. GFR  (NON AFRICAN AMERICAN): >60 ML/MIN/1.73 M^2
GLUCOSE SERPL-MCNC: 85 MG/DL
POTASSIUM SERPL-SCNC: 4 MMOL/L
PROT SERPL-MCNC: 6.4 G/DL
SODIUM SERPL-SCNC: 139 MMOL/L

## 2017-09-23 PROCEDURE — 25000003 PHARM REV CODE 250: Performed by: STUDENT IN AN ORGANIZED HEALTH CARE EDUCATION/TRAINING PROGRAM

## 2017-09-23 PROCEDURE — 80053 COMPREHEN METABOLIC PANEL: CPT

## 2017-09-23 PROCEDURE — 36415 COLL VENOUS BLD VENIPUNCTURE: CPT

## 2017-09-23 PROCEDURE — 99239 HOSP IP/OBS DSCHRG MGMT >30: CPT | Mod: 24,,, | Performed by: OBSTETRICS & GYNECOLOGY

## 2017-09-23 RX ORDER — CEPHALEXIN 500 MG/1
500 CAPSULE ORAL EVERY 6 HOURS
Qty: 28 CAPSULE | Refills: 0 | Status: SHIPPED | OUTPATIENT
Start: 2017-09-23 | End: 2017-09-30

## 2017-09-23 RX ADMIN — CEPHALEXIN 500 MG: 500 CAPSULE ORAL at 08:09

## 2017-09-23 RX ADMIN — PRENATAL VIT W/ FE FUMARATE-FA TAB 27-0.8 MG 1 EACH: 27-0.8 TAB at 08:09

## 2017-09-23 RX ADMIN — CEPHALEXIN 500 MG: 500 CAPSULE ORAL at 01:09

## 2017-09-23 NOTE — PROGRESS NOTES
MAG NOTE     Maribel Valderrama is a 32 y.o. female on Magnesium for seizure prophylaxis for post-partum Pre-E with severe features.    Patient denies headaches, denies blurry vision and denies right upper quadrant pain. denies CP, denies SOB. Patient reports no nausea/no vomiting.     Objective:       /71   Pulse 102   Temp 97.9 °F (36.6 °C) (Temporal)   Resp 18   LMP 12/12/2016 (Exact Date)   SpO2 99%   Breastfeeding? Yes   Vitals:    09/22/17 1925 09/22/17 1930 09/22/17 1935 09/22/17 1940   BP:       Pulse: 110 106 105 102   Resp:       Temp:       TempSrc:       SpO2: 100% 99% 99% 99%         I/O last 3 completed shifts:  In: 3546.7 [P.O.:1360; I.V.:2186.7]  Out: 3525 [Urine:3525]    I/O this shift:  In: 75 [I.V.:75]  Out: -       Date 09/22/17 0700 - 09/23/17 0659   Shift 3436-4084 7021-4414 5695-9171 24 Hour Total   I  N  T  A  K  E   P.O. 640   640    I.V. 650 875  1525    Shift Total 1290 875  2165   O  U  T  P  U  T   Urine 1125 1000  2125    Shift Total 1125 1000  2125   Weight (kg)             General:   alert, appears stated age and cooperative   Lungs:   clear to auscultation bilaterally   Heart:   regular rate and rhythm, S1, S2 normal, no murmur, click, rub or gallop   Abdomen:  soft, non-tender; bowel sounds normal; no masses,  no organomegaly   Uterine Size:  firm located below the umblicus.    Extremities: peripheral pulses normal, no pedal edema, no clubbing or cyanosis   Reflexes - 2+  bilaterally       Lab Review  Recent Results (from the past 48 hour(s))   CBC auto differential    Collection Time: 09/21/17  9:00 PM   Result Value Ref Range    WBC 6.48 3.90 - 12.70 K/uL    RBC 3.47 (L) 4.00 - 5.40 M/uL    Hemoglobin 11.7 (L) 12.0 - 16.0 g/dL    Hematocrit 33.8 (L) 37.0 - 48.5 %    MCV 97 82 - 98 fL    MCH 33.7 (H) 27.0 - 31.0 pg    MCHC 34.6 32.0 - 36.0 g/dL    RDW 12.9 11.5 - 14.5 %    Platelets 52 (L) 150 - 350 K/uL    MPV 12.6 9.2 - 12.9 fL    Gran # 5.7 1.8 - 7.7 K/uL     Lymph # 0.4 (L) 1.0 - 4.8 K/uL    Mono # 0.3 0.3 - 1.0 K/uL    Eos # 0.0 0.0 - 0.5 K/uL    Baso # 0.00 0.00 - 0.20 K/uL    Gran% 88.4 (H) 38.0 - 73.0 %    Lymph% 6.6 (L) 18.0 - 48.0 %    Mono% 3.9 (L) 4.0 - 15.0 %    Eosinophil% 0.5 0.0 - 8.0 %    Basophil% 0.0 0.0 - 1.9 %    Differential Method Automated    Comprehensive metabolic panel    Collection Time: 09/21/17 11:51 PM   Result Value Ref Range    Sodium 137 136 - 145 mmol/L    Potassium 3.4 (L) 3.5 - 5.1 mmol/L    Chloride 107 95 - 110 mmol/L    CO2 21 (L) 23 - 29 mmol/L    Glucose 102 70 - 110 mg/dL    BUN, Bld 12 6 - 20 mg/dL    Creatinine 0.7 0.5 - 1.4 mg/dL    Calcium 8.7 8.7 - 10.5 mg/dL    Total Protein 6.2 6.0 - 8.4 g/dL    Albumin 2.9 (L) 3.5 - 5.2 g/dL    Total Bilirubin 0.4 0.1 - 1.0 mg/dL    Alkaline Phosphatase 92 55 - 135 U/L    AST 40 10 - 40 U/L     (H) 10 - 44 U/L    Anion Gap 9 8 - 16 mmol/L    eGFR if African American >60 >60 mL/min/1.73 m^2    eGFR if non African American >60 >60 mL/min/1.73 m^2   Protein / creatinine ratio, urine    Collection Time: 09/22/17 12:30 AM   Result Value Ref Range    Protein, Urine Random 12 0 - 15 mg/dL    Creatinine, Random Ur 33.5 15.0 - 325.0 mg/dL    Prot/Creat Ratio, Ur 0.36 (H) 0.00 - 0.20   Lactate dehydrogenase    Collection Time: 09/22/17  2:07 AM   Result Value Ref Range     110 - 260 U/L   CBC auto differential    Collection Time: 09/22/17  2:53 PM   Result Value Ref Range    WBC 13.77 (H) 3.90 - 12.70 K/uL    RBC 3.74 (L) 4.00 - 5.40 M/uL    Hemoglobin 12.6 12.0 - 16.0 g/dL    Hematocrit 36.8 (L) 37.0 - 48.5 %    MCV 98 82 - 98 fL    MCH 33.7 (H) 27.0 - 31.0 pg    MCHC 34.2 32.0 - 36.0 g/dL    RDW 13.0 11.5 - 14.5 %    Platelets 286 150 - 350 K/uL    MPV 8.8 (L) 9.2 - 12.9 fL    Gran # 12.0 (H) 1.8 - 7.7 K/uL    Lymph # 1.0 1.0 - 4.8 K/uL    Mono # 0.6 0.3 - 1.0 K/uL    Eos # 0.0 0.0 - 0.5 K/uL    Baso # 0.01 0.00 - 0.20 K/uL    Gran% 87.2 (H) 38.0 - 73.0 %    Lymph% 7.4 (L) 18.0 - 48.0  %    Mono% 4.6 4.0 - 15.0 %    Eosinophil% 0.3 0.0 - 8.0 %    Basophil% 0.1 0.0 - 1.9 %    Differential Method Automated           Assessment:     32 y.o.  female , on magnesium sulfate    Active Hospital Problems    Diagnosis  POA    Pre-eclampsia in postpartum period [O14.95]  Unknown    Thrombocytopenia [D69.6]  Unknown    Mastitis [N61.0]  Unknown      Resolved Hospital Problems    Diagnosis Date Resolved POA   No resolved problems to display.        Plan:   1. Continue magnesium sulfate, no signs of toxicity at this time  2. Monitor for s/s of eclampsia  3. Close maternal monitoring including UOP-  > 200/hr and BP: (118-175)/(62-93) 136/71  4. Recheck in 2-4 hours or PRN  5. Patient will be at 24 hours of Mag at 2315    Kitty Bee M.D.  PGY1 OB/GYN

## 2017-09-23 NOTE — PROGRESS NOTES
[facility]  Obstetrics & Gynecology  Progress Note    Patient Name: Maribel Valderrama  MRN: [id number]  Admission Date: 2017  Primary Care Provider: Primary Doctor No  Principal Problem: Pre E postpartum    Subjective:     HPI:  Maribel Valderrama is a 32 y.o. [id number] who is PPD#8 s/p  presents complaining of pain and hardening in her right breast. She notes a temperature of 100.3 at home. She denies nausea, vomiting, has had a bowel movement two days ago, and denies dysuria or difficulty urinating.  Patient also notes headaches since the last two days. The headache has been intermittent, throbbing, in the right frontal head. She denies any vision changes, SOB, chest pain, increased extremity edema, or RUQ pain. She has no history of hypertension.  This IUP was complicated by h/o marginal previa now resolved, h/o bilateral ovarian cystectomy, benign right ovarian teratoma, and h/o missed AB.    Interval History:   Patient doing well this AM, took a shower this morning. She reports feeling good, remains afebrile. She denies current headache, RUQ pain, visual changes, SOB.  She reports right breast continues to improve. She is pumping for infant. Denies fever, chills, CP, SOB. Tolerating diet w/o N/V.    Scheduled Meds:   cephALEXin  500 mg Oral Q6H    prenatal vitamin  1 tablet Oral Daily     Continuous Infusions:   lactated Ringers 1,000 mL (17 2312)    magnesium sulfate in water 2 g/hr (17 0004)     PRN Meds:calcium gluconate, diphenhydrAMINE, diphenhydrAMINE, hydrocodone-acetaminophen 5-325mg, ibuprofen, ondansetron, promethazine (PHENERGAN) IVPB, senna-docusate 8.6-50 mg, simethicone    Review of patient's allergies indicates:  No Known Allergies    Objective:     Vital Signs (Most Recent):  Temp: 97.9 °F (36.6 °C) (17 0305)  Pulse: 99 (17 0515)  Resp: 18 (17 0420)  BP: 118/68 (17 0520)  SpO2: 96 % (17 0515) Vital Signs (24h  Range):  Temp:  [97.3 °F (36.3 °C)-100.6 °F (38.1 °C)] 97.9 °F (36.6 °C)  Pulse:  [] 99  Resp:  [18] 18  SpO2:  [96 %-99 %] 96 %  BP: (118-183)/(62-93) 118/68        There is no height or weight on file to calculate BMI.  Patient's last menstrual period was 12/12/2016 (exact date).    I&O (Last 24H):    I/O last 3 completed shifts:  In: 3546.7 [P.O.:1360; I.V.:2186.7]  Out: 3525 [Urine:3525]  I/O this shift:  In: 354.2 [I.V.:354.2]  Out: 1100 [Urine:1100]      Laboratory:    Recent Labs  Lab 09/21/17  2100 09/22/17  1453   WBC 6.48 13.77*   HGB 11.7* 12.6   HCT 33.8* 36.8*   MCV 97 98   PLT 52* 286        Recent Labs  Lab 09/21/17  2351      K 3.4*      CO2 21*   BUN 12   CREATININE 0.7      PROT 6.2   BILITOT 0.4   ALKPHOS 92   *   AST 40        P:C 0.36        Physical Exam:   Constitutional: She is oriented to person, place, and time. She appears well-developed and well-nourished.    HENT:   Head: Normocephalic and atraumatic.    Eyes: EOM are normal.    Neck: Normal range of motion.    Cardiovascular: Normal rate and regular rhythm.     Pulmonary/Chest: Effort normal and breath sounds normal. No respiratory distress. She has no wheezes. She has no rales. Right breast exhibits tenderness (improved). Right breast exhibits no mass, no nipple discharge, no skin change (lactating), no bleeding and no swelling (warmth). Left breast exhibits no skin change, no tenderness, no bleeding and no swelling. Breasts are symmetrical.    Abdominal: Soft. There is no tenderness. There is no rebound and no guarding.                 Neurological: She is alert and oriented to person, place, and time. She has normal reflexes.   2+ DTRs     Psychiatric: She has a normal mood and affect. Her behavior is normal. Judgment and thought content normal.       Assessment/Plan:     Pre-eclampsia in postpartum period    - severe range elevated blood pressures in [facility], now normal range  - BP: (122-140)/(67-88)  139/86  - BP WNL overnight, BP: (118-183)/(62-93) 122/76  - PreE labs   - Plts 45>286   - AST/ALT 40/173   - Cr 0.7   - P:C 0.36  - s/p Mag x 24h        Mastitis    - Improving  - Tmax/last (9/21) 100.6 @ 1946   - Cont keflex 500gm q6h  - Continue monitoring  - Okay to continue pumping for baby        Thrombocytopenia    - platelets 45 (down from baseline >200)  - resolved --> likely lab error, repeat 286k          Discharge home today as long as BP remain normal and patient remains afebrile    Mecca Jean MD  Obstetrics & Gynecology  [facility]

## 2017-09-23 NOTE — ASSESSMENT & PLAN NOTE
- severe range elevated blood pressures in [facility], now normal range  - BP: (122-140)/(67-88) 139/86  - BP WNL overnight, BP: (118-183)/(62-93) 122/76  - PreE labs   - Plts 45>286   - AST/ALT 40/173   - Cr 0.7   - P:C 0.36  - s/p Mag x 24h

## 2017-09-23 NOTE — ASSESSMENT & PLAN NOTE
- Improving  - Tmax/last (9/21) 100.6 @ 1946   - Cont keflex 500gm q6h  - Continue monitoring  - Okay to continue pumping for baby

## 2017-09-23 NOTE — SUBJECTIVE & OBJECTIVE
Interval History:   Patient doing well this AM, took a shower this morning. She reports feeling good, remains afebrile. She denies current headache, RUQ pain, visual changes, SOB.  She reports right breast continues to improve. She is pumping for infant. Denies fever, chills, CP, SOB. Tolerating diet w/o N/V.    Scheduled Meds:   cephALEXin  500 mg Oral Q6H    prenatal vitamin  1 tablet Oral Daily     Continuous Infusions:   lactated Ringers 1,000 mL (09/21/17 2312)    magnesium sulfate in water 2 g/hr (09/22/17 0004)     PRN Meds:calcium gluconate, diphenhydrAMINE, diphenhydrAMINE, hydrocodone-acetaminophen 5-325mg, ibuprofen, ondansetron, promethazine (PHENERGAN) IVPB, senna-docusate 8.6-50 mg, simethicone    Review of patient's allergies indicates:  No Known Allergies    Objective:     Vital Signs (Most Recent):  Temp: 97.9 °F (36.6 °C) (09/22/17 0305)  Pulse: 99 (09/22/17 0515)  Resp: 18 (09/22/17 0420)  BP: 118/68 (09/22/17 0520)  SpO2: 96 % (09/22/17 0515) Vital Signs (24h Range):  Temp:  [97.3 °F (36.3 °C)-100.6 °F (38.1 °C)] 97.9 °F (36.6 °C)  Pulse:  [] 99  Resp:  [18] 18  SpO2:  [96 %-99 %] 96 %  BP: (118-183)/(62-93) 118/68        There is no height or weight on file to calculate BMI.  Patient's last menstrual period was 12/12/2016 (exact date).    I&O (Last 24H):    I/O last 3 completed shifts:  In: 3546.7 [P.O.:1360; I.V.:2186.7]  Out: 3525 [Urine:3525]  I/O this shift:  In: 354.2 [I.V.:354.2]  Out: 1100 [Urine:1100]      Laboratory:    Recent Labs  Lab 09/21/17  2100 09/22/17  1453   WBC 6.48 13.77*   HGB 11.7* 12.6   HCT 33.8* 36.8*   MCV 97 98   PLT 52* 286        Recent Labs  Lab 09/21/17  2351      K 3.4*      CO2 21*   BUN 12   CREATININE 0.7      PROT 6.2   BILITOT 0.4   ALKPHOS 92   *   AST 40        P:C 0.36        Physical Exam:   Constitutional: She is oriented to person, place, and time. She appears well-developed and well-nourished.    HENT:   Head:  Normocephalic and atraumatic.    Eyes: EOM are normal.    Neck: Normal range of motion.    Cardiovascular: Normal rate and regular rhythm.     Pulmonary/Chest: Effort normal and breath sounds normal. No respiratory distress. She has no wheezes. She has no rales. Right breast exhibits tenderness (improved). Right breast exhibits no mass, no nipple discharge, no skin change (lactating), no bleeding and no swelling (warmth). Left breast exhibits no skin change, no tenderness, no bleeding and no swelling. Breasts are symmetrical.            Abdominal: Soft. There is no tenderness. There is no rebound and no guarding.                 Neurological: She is alert and oriented to person, place, and time. She has normal reflexes.   2+ DTRs     Psychiatric: She has a normal mood and affect. Her behavior is normal. Judgment and thought content normal.

## 2017-09-23 NOTE — DISCHARGE SUMMARY
[facility]  Obstetrics & Gynecology  Discharge Summary    Patient Name: Maribel Valderrama  MRN: [id number]  Admission Date: 2017  Hospital Length of Stay: 2 days  Discharge Date and Time:  2017  Attending Physician: Alyssia Chavarria MD   Discharging Provider: Mecca Jean MD  Primary Care Provider: Primary Doctor No    HPI:  Maribel Valderrama is a 32 y.o. [id number] who is PPD#8 s/p  presents complaining of pain and hardening in her right breast. She notes a temperature of 100.3 at home. She denies nausea, vomiting, has had a bowel movement two days ago, and denies dysuria or difficulty urinating.  Patient also notes headaches since the last two days. The headache has been intermittent, throbbing, in the right frontal head. She denies any vision changes, SOB, chest pain, increased extremity edema, or RUQ pain. She has no history of hypertension.  This IUP was complicated by h/o marginal previa now resolved, h/o bilateral ovarian cystectomy, benign right ovarian teratoma, and h/o missed AB.    Hospital Course:  2017 - patient presented to [facility] with right sided breast pain and headache. Severe range blood pressures noted, 20mg of labetalol pushed. Platelets noted to be 54. Patient admitted, will give MgSO4 for neuroprophylaxis.  2017- No acute events overnight. No PreE symptoms this AM. Reports she is feeling better. Breast exam improving. Repeat labs show platelets of 286 (prior result likely lab error). MgSO4 stopped after 24h  2017 - Doing well, remains afebrile. Neg PreE ROS. BP normal range on no meds. Ready for DC home with 1 week BP check and Rx for abx.    * No surgery found *     Consults         Status Ordering Provider     Inpatient consult to Lactation  Once     Provider:  (Not yet assigned)    Acknowledged JAIRO SHAH          Significant Diagnostic Studies: ]    Chemistries:     Recent Labs  Lab 17  2351      K 3.4*       CO2 21*   BUN 12   CREATININE 0.7   CALCIUM 8.7   PROT 6.2   BILITOT 0.4   ALKPHOS 92   *   AST 40        CBC/Anemia Labs:     Recent Labs  Lab 09/21/17 2100 09/22/17  1453   WBC 6.48 13.77*   HGB 11.7* 12.6   HCT 33.8* 36.8*   PLT 52* 286   MCV 97 98   RDW 12.9 13.0            Pending Diagnostic Studies:     Procedure Component Value Units Date/Time    Comprehensive metabolic panel [[id number]] Collected:  09/21/17 2100    Order Status:  Sent Lab Status:  In process Updated:  09/21/17 2133    Specimen:  Blood from Blood         Final Active Diagnoses:    Diagnosis Date Noted POA    PRINCIPAL PROBLEM:  Pre-eclampsia in postpartum period [O14.95] 09/22/2017 Unknown    Mastitis [N61.0] 09/21/2017 Unknown      Problems Resolved During this Admission:    Diagnosis Date Noted Date Resolved POA    Thrombocytopenia [D69.6] 09/21/2017 09/23/2017 Unknown        Discharged Condition: good    Disposition: Home or Self Care    Follow Up:  Follow-up Information     Alyssia Chavarria MD In 1 week.    Specialties:  Obstetrics, Obstetrics and Gynecology  Why:  BP check  Contact information:  [address] [place] [address] [id number] [phone]                 Patient Instructions:     Diet general     Activity as tolerated     Call MD for:  severe uncontrolled pain     Call MD for:  temperature >100.4     Call MD for:  difficulty breathing or increased cough     Call MD for:  severe persistent headache     Call MD for:  persistent dizziness, light-headedness, or visual disturbances       Medications:  Reconciled Home Medications:   Current Discharge Medication List      START taking these medications    Details   cephALEXin (KEFLEX) 500 MG capsule Take 1 capsule (500 mg total) by mouth every 6 (six) hours.  Qty: 28 capsule, Refills: 0         CONTINUE these medications which have NOT CHANGED    Details   docusate sodium (COLACE) 100 MG capsule Take 1 capsule (100 mg total) by mouth daily as needed for  Constipation.  Qty: 30 capsule, Refills: 3    Associated Diagnoses: 17 weeks gestation of pregnancy; Constipation during pregnancy, second trimester      ibuprofen (ADVIL,MOTRIN) 600 MG tablet Take 1 tablet (600 mg total) by mouth every 6 (six) hours.  Qty: 40 tablet, Refills: 0    Associated Diagnoses:  (spontaneous vaginal delivery)      PRENATAL VIT W-CA,FE,FA,<1 MG, (PRENATAL VITAMIN ORAL) Take by mouth.             Mecca Jean MD  Obstetrics & Gynecology  [facility]

## 2017-09-26 ENCOUNTER — TELEPHONE (OUTPATIENT)
Dept: OBSTETRICS AND GYNECOLOGY | Facility: CLINIC | Age: 32
End: 2017-09-26

## 2017-09-26 NOTE — TELEPHONE ENCOUNTER
----- Message from Mary Oliver MD sent at 9/24/2017  7:40 AM CDT -----  Regarding: Breast pump  Hi,  Ms. Valderrama was admitted and discharged over the weekend for mastitis and severe preeclampsia. I have recommended that she see Dr. Chavarria in follow-up this week for repeat evaluation.   Additionally, she was inquiring about a breast pump prescription. She plans to directly breastfeed right now but is planning to go back to work in about a month and would like to have a breast pump.   Please let me know if I can help with anything.  Thanks,  Elli Oliver (new MFM at [facility])

## 2017-09-29 ENCOUNTER — POSTPARTUM VISIT (OUTPATIENT)
Dept: OBSTETRICS AND GYNECOLOGY | Facility: CLINIC | Age: 32
End: 2017-09-29
Attending: OBSTETRICS & GYNECOLOGY
Payer: COMMERCIAL

## 2017-09-29 VITALS
WEIGHT: 172.19 LBS | DIASTOLIC BLOOD PRESSURE: 80 MMHG | BODY MASS INDEX: 27.02 KG/M2 | HEIGHT: 67 IN | SYSTOLIC BLOOD PRESSURE: 124 MMHG

## 2017-09-29 PROCEDURE — 3008F BODY MASS INDEX DOCD: CPT | Mod: S$GLB,,, | Performed by: OBSTETRICS & GYNECOLOGY

## 2017-09-29 PROCEDURE — 99999 PR PBB SHADOW E&M-EST. PATIENT-LVL II: CPT | Mod: PBBFAC,,, | Performed by: OBSTETRICS & GYNECOLOGY

## 2017-09-29 PROCEDURE — 99212 OFFICE O/P EST SF 10 MIN: CPT | Mod: S$GLB,,, | Performed by: OBSTETRICS & GYNECOLOGY

## 2017-09-29 NOTE — PROGRESS NOTES
"POST PARTUM INTERIM CHECK    Date: 2017       28 year old female patient   Presents today 2 weeks following a NVD. The delivery and hospitalization were complicated by Pre Eclampsia requiring Magnesium .. She denies headaches, states her swelling has resolved and is generally feeling better.    Delivery Date:  Delivery MD:Elizabeth    INFANT: Petar Mckeon    PE: /80   Ht 5' 7" (1.702 m)   Wt 78.1 kg (172 lb 2.9 oz)   LMP 2016 (Exact Date)   BMI 26.97 kg/m²   Extremities: No edema.  Normal REflexes    DIAGNOSIS:  Interim post partum follow up for incision / B/P check.    1. Pre-eclampsia in postpartum period      and PLAN:    PLAN: REturn in 4 weeks    MEDICATIONS PRESCRIBED: PNV      Return in about 4 weeks (around 10/27/2017).      "

## 2017-09-30 PROBLEM — D69.6 THROMBOCYTOPENIA: Status: RESOLVED | Noted: 2017-09-30 | Resolved: 2017-09-23

## 2017-09-30 PROBLEM — N61.0 MASTITIS: Status: ACTIVE | Noted: 2017-09-30

## 2017-10-30 ENCOUNTER — POSTPARTUM VISIT (OUTPATIENT)
Dept: OBSTETRICS AND GYNECOLOGY | Facility: CLINIC | Age: 32
End: 2017-10-30
Attending: OBSTETRICS & GYNECOLOGY
Payer: COMMERCIAL

## 2017-10-30 VITALS
SYSTOLIC BLOOD PRESSURE: 128 MMHG | BODY MASS INDEX: 27.93 KG/M2 | DIASTOLIC BLOOD PRESSURE: 82 MMHG | HEIGHT: 67 IN | WEIGHT: 177.94 LBS

## 2017-10-30 DIAGNOSIS — Z30.09 GENERAL COUNSELING AND ADVICE ON FEMALE CONTRACEPTION: ICD-10-CM

## 2017-10-30 DIAGNOSIS — R35.0 URINARY FREQUENCY: ICD-10-CM

## 2017-10-30 PROBLEM — O09.293 PRIOR PREGNANCY WITH FETAL DEMISE AND CURRENT PREGNANCY IN THIRD TRIMESTER: Status: RESOLVED | Noted: 2017-02-15 | Resolved: 2017-10-30

## 2017-10-30 PROBLEM — Z34.80 SUPERVISION OF OTHER NORMAL PREGNANCY: Status: RESOLVED | Noted: 2017-02-15 | Resolved: 2017-10-30

## 2017-10-30 PROBLEM — N61.0 MASTITIS: Status: RESOLVED | Noted: 2017-09-30 | Resolved: 2017-10-30

## 2017-10-30 PROBLEM — O99.013 ANTEPARTUM ANEMIA IN THIRD TRIMESTER: Status: RESOLVED | Noted: 2017-07-11 | Resolved: 2017-10-30

## 2017-10-30 PROBLEM — O26.899 PELVIC PRESSURE IN PREGNANCY, ANTEPARTUM: Status: RESOLVED | Noted: 2017-03-20 | Resolved: 2017-10-30

## 2017-10-30 PROBLEM — R10.2 PELVIC PRESSURE IN PREGNANCY, ANTEPARTUM: Status: RESOLVED | Noted: 2017-03-20 | Resolved: 2017-10-30

## 2017-10-30 PROCEDURE — 99999 PR PBB SHADOW E&M-EST. PATIENT-LVL III: CPT | Mod: PBBFAC,,, | Performed by: OBSTETRICS & GYNECOLOGY

## 2017-10-30 PROCEDURE — 87088 URINE BACTERIA CULTURE: CPT

## 2017-10-30 PROCEDURE — 87147 CULTURE TYPE IMMUNOLOGIC: CPT

## 2017-10-30 PROCEDURE — 0503F POSTPARTUM CARE VISIT: CPT | Mod: S$GLB,,, | Performed by: OBSTETRICS & GYNECOLOGY

## 2017-10-30 PROCEDURE — 87086 URINE CULTURE/COLONY COUNT: CPT

## 2017-10-30 RX ORDER — ACETAMINOPHEN AND CODEINE PHOSPHATE 120; 12 MG/5ML; MG/5ML
1 SOLUTION ORAL DAILY
Qty: 28 TABLET | Refills: 12 | Status: SHIPPED | OUTPATIENT
Start: 2017-10-30 | End: 2018-05-17

## 2017-10-30 NOTE — PROGRESS NOTES
"CC: Post-partum follow-up    Maribel Valderrama is a 32 y.o. female  who presents for post-partum visit.  She is S/P a .  She and the baby are doing well.  No pain.  No fever.   No bowel  Complaints. Reports odor with urination and frequency.    Delivery Date: 2017  Delivery MD: Elizabeth  Gender: male  Name: Petar  Birth Weight: 8 pounds 1 ounces  Breast Feeding: YES  Depression: NO  Contraception: oral progesterone-only contraceptive    Pregnancy was complicated by:  N/A    /82   Ht 5' 7" (1.702 m)   Wt 80.7 kg (177 lb 14.6 oz)   LMP 2016 (Exact Date)   BMI 27.86 kg/m²     ROS:  GENERAL: No fever, chills, fatigability.  VULVAR: No pain, no lesions and no itching.  VAGINAL: No relaxation, no itching, no discharge, no abnormal bleeding and no lesions.  ABDOMEN: No abdominal pain. Denies nausea. Denies vomiting. No diarrhea. No constipation  BREAST: Denies pain. No lumps. No discharge.  URINARY: No incontinence, no nocturia, no frequency and no dysuria.  CARDIOVASCULAR: No chest pain. No shortness of breath. No leg cramps.  NEUROLOGICAL: No headaches. No vision changes.    PHYSICAL EXAM:  ABDOMEN:  Soft, non-tender, non-distended  VULVA:  Normal, no lesions  CERVIX:  Without lesions, polyps or tenderness.  UTERUS:  Normal size, shape, consistency, no mass or tenderness.  ADNEXA:  Normal in size without mass or tenderness    IMP:  Doing well S/P   Instructions / precautions reviewed  Contraceptive counseling    Rx Micronor  Urine culture    PLAN:  May resume normal activities  Return in about 4 months (around 2018).      "

## 2017-10-31 LAB — BACTERIA UR CULT: NORMAL

## 2018-01-02 ENCOUNTER — TELEPHONE (OUTPATIENT)
Dept: ORTHOPEDICS | Facility: CLINIC | Age: 33
End: 2018-01-02

## 2018-01-02 DIAGNOSIS — M79.641 RIGHT HAND PAIN: Primary | ICD-10-CM

## 2018-01-02 NOTE — TELEPHONE ENCOUNTER
----- Message from Dunia James sent at 1/2/2018 12:17 PM CST -----  Contact: self  Pt is scheduled for an appt on 01/31.  Per pt would like to know if there is anyway she can be seen sooner.  Pt would like a call back in regards to this matter.    Pt can be reached at [phone].    Thank you

## 2018-01-03 ENCOUNTER — OFFICE VISIT (OUTPATIENT)
Dept: ORTHOPEDICS | Facility: CLINIC | Age: 33
End: 2018-01-03
Payer: COMMERCIAL

## 2018-01-03 ENCOUNTER — HOSPITAL ENCOUNTER (OUTPATIENT)
Dept: RADIOLOGY | Facility: OTHER | Age: 33
Discharge: HOME | End: 2018-01-03
Attending: PLASTIC SURGERY
Payer: COMMERCIAL

## 2018-01-03 VITALS
WEIGHT: 177 LBS | HEIGHT: 67 IN | DIASTOLIC BLOOD PRESSURE: 74 MMHG | SYSTOLIC BLOOD PRESSURE: 138 MMHG | BODY MASS INDEX: 27.78 KG/M2

## 2018-01-03 DIAGNOSIS — M65.4 DE QUERVAIN'S TENOSYNOVITIS, RIGHT: Primary | ICD-10-CM

## 2018-01-03 DIAGNOSIS — M79.641 RIGHT HAND PAIN: ICD-10-CM

## 2018-01-03 PROCEDURE — 99999 PR PBB SHADOW E&M-EST. PATIENT-LVL II: CPT | Mod: PBBFAC,,, | Performed by: PLASTIC SURGERY

## 2018-01-03 PROCEDURE — 99203 OFFICE O/P NEW LOW 30 MIN: CPT | Mod: 25,S$GLB,, | Performed by: PLASTIC SURGERY

## 2018-01-03 PROCEDURE — 20550 NJX 1 TENDON SHEATH/LIGAMENT: CPT | Mod: LT,S$GLB,, | Performed by: PLASTIC SURGERY

## 2018-01-03 PROCEDURE — 73130 X-RAY EXAM OF HAND: CPT | Mod: 26,FY,RT, | Performed by: INTERNAL MEDICINE

## 2018-01-03 PROCEDURE — 73130 X-RAY EXAM OF HAND: CPT | Mod: TC,FY,RT

## 2018-01-03 RX ORDER — LIDOCAINE HYDROCHLORIDE 10 MG/ML
1 INJECTION INFILTRATION; PERINEURAL
Status: COMPLETED | OUTPATIENT
Start: 2018-01-03 | End: 2018-01-03

## 2018-01-03 RX ORDER — DEXAMETHASONE SODIUM PHOSPHATE 4 MG/ML
4 INJECTION, SOLUTION INTRA-ARTICULAR; INTRALESIONAL; INTRAMUSCULAR; INTRAVENOUS; SOFT TISSUE
Status: COMPLETED | OUTPATIENT
Start: 2018-01-03 | End: 2018-01-03

## 2018-01-03 RX ADMIN — DEXAMETHASONE SODIUM PHOSPHATE 4 MG: 4 INJECTION, SOLUTION INTRA-ARTICULAR; INTRALESIONAL; INTRAMUSCULAR; INTRAVENOUS; SOFT TISSUE at 10:01

## 2018-01-03 RX ADMIN — LIDOCAINE HYDROCHLORIDE 1 ML: 10 INJECTION INFILTRATION; PERINEURAL at 10:01

## 2018-01-03 NOTE — PROGRESS NOTES
Chief Complaint: Pain of the Right Wrist      HPI:    Maribel Valderrama is a right hand dominant 32 y.o. female presenting today for right radial sided wrist pain.There was not a history of trauma.  Onset of symptoms began 2 weeks ago.The pain is intermittent with use, currently a 6/10 at rest, is localized to the right radial styloid. Symptoms aggravated by gripping, ulnar deviated. Pain does not radiate  and is not worse at night .  Alleviating factors include rest .    History reviewed. No pertinent past medical history.    Past Surgical History:   Procedure Laterality Date    Da Kory-assisted resection of complex left ovarian cyst x2 Left 06/22/2016    DONNA done at time of surgery -Dermoid and Hemorrhagic ovarian cyst    Ovarian Teratoma Removal Right 4/5/2010    15 weeks gestation, DERMOID RIGHT         Family History   Problem Relation Age of Onset    Hypertension Mother     Hypertension Father     Breast cancer Other     Cancer Other      cervical cancer    Diabetes Maternal Uncle     Ovarian cancer Neg Hx     Stroke Neg Hx     Colon cancer Neg Hx     Celiac disease Neg Hx     Cirrhosis Neg Hx     Colon polyps Neg Hx     Crohn's disease Neg Hx     Esophageal cancer Neg Hx     Inflammatory bowel disease Neg Hx     Liver cancer Neg Hx     Liver disease Neg Hx     Rectal cancer Neg Hx     Stomach cancer Neg Hx     Ulcerative colitis Neg Hx        Social History     Social History    Marital status:      Spouse name: Roxanne    Number of children: 1    Years of education: N/A     Occupational History    Registered Nurse       [employer]     Social History Main Topics    Smoking status: Never Smoker    Smokeless tobacco: Never Used    Alcohol use No    Drug use: No    Sexual activity: Yes     Partners: Male     Birth control/ protection: None      Comment:  to roxanne since 2009     Other Topics Concern    None     Social History Narrative    None       Review of  "patient's allergies indicates:  No Known Allergies      Current Outpatient Prescriptions:     docusate sodium (COLACE) 100 MG capsule, Take 1 capsule (100 mg total) by mouth daily as needed for Constipation., Disp: 30 capsule, Rfl: 3    PRENATAL VIT W-CA,FE,FA,<1 MG, (PRENATAL VITAMIN ORAL), Take by mouth., Disp: , Rfl:     norethindrone (ORTHO MICRONOR) 0.35 mg tablet, Take 1 tablet (0.35 mg total) by mouth once daily., Disp: 28 tablet, Rfl: 12    Current Facility-Administered Medications:     [COMPLETED] dexamethasone injection 4 mg, 4 mg, Other, 1 time in Clinic/HOD, Kennedy Michael Jr., MD, 4 mg at 01/03/18 1030    [COMPLETED] lidocaine HCL 10 mg/ml (1%) injection 1 mL, 1 mL, Other, 1 time in Clinic/HOD, Kennedy Michael Jr., MD, 1 mL at 01/03/18 1030        Review of Systems:  Constitutional: no fever or chills  ENT: no nasal congestion or sore throat  Respiratory: no cough or shortness of breath  Cardiovascular: no chest pain or palpitations  Gastrointestinal: no nausea or vomiting, PUD, GERD, NSAID intolerance  Genitourinary: no hematuria or dysuria  Integument/Breast: no rash or pruritis  Hematologic/Lymphatic: no easy bruising or lymphadenopathy  Musculoskeletal: see HPI  Neurological: no seizures or tremors  Behavioral/Psych: no auditory or visual hallucinations      Objective:      PHYSICAL EXAM:  Vitals:    01/03/18 1010   BP: 138/74   Weight: 80.3 kg (177 lb)   Height: 5' 7" (1.702 m)   PainSc: 0-No pain   PainLoc: Wrist     General Appearance: WDWN, NAD  Neuro/Psych: Mood & affect appropriate  Lungs: Respirations equal and unlabored.   CV: No apparent CV dysfunction, distal pulses intact, RRR  Skin: Intact throughout  Extremities:   Ortho Exam      DIAGNOSTICS/IMAGING:    Radiologist's Impression:     Normal xray   Comments: I have personnaly reviewed the imaging and I agree with the above radiologist's report.  I have explained the risks, benefits, and alternatives of the procedure in " detail.  The patient voices understanding and all questions have been answered.  The patient agrees to proceed as planned. After a sterile prep of the skin in the normal the right 1st dorsal compartment is injected from the radial approach using a 25 gauge needle with a combination of 1cc 1% plain xylocaine and 40mg of Kenalog.  The patient is cautioned and immediate relief of pain is secondary to the local anesthetic and will be temporary.  After the anesthetic wears off there may be a increase in pain that may last for a few hours or a few days and they should use ice to help alleviate this flair up of pain.           Assessment:     Encounter Diagnosis   Name Primary?    De Quervain's tenosynovitis, right Yes              Plan/Discussion:   Maribel was seen today for pain.    Diagnoses and all orders for this visit:    De Quervain's tenosynovitis, right    Other orders  -     dexamethasone injection 4 mg; 1 mL (4 mg total) by Other route one time.  -     lidocaine HCL 10 mg/ml (1%) injection 1 mL; 1 mL by Other route one time.    We talked at length about the pathophysiology and progression of DeQuervain's tenosynovitis.   An injection for pain relief was offered.  she wished  to proceed.  she will follow up in 6-8 weeks if symptoms fail to improve.

## 2018-05-10 ENCOUNTER — TELEPHONE (OUTPATIENT)
Dept: OBSTETRICS AND GYNECOLOGY | Facility: CLINIC | Age: 33
End: 2018-05-10

## 2018-05-10 NOTE — TELEPHONE ENCOUNTER
----- Message from Ileana Merino MA sent at 5/7/2018  5:23 PM CDT -----  Contact: JOLENE GUTIERREZ [[id number]]    ----- Message -----  From: Apurva Rodriguez  Sent: 5/7/2018   3:41 PM  To: [facility] Staff              Name of Who is Calling:JOLENE GUTIERREZ [[id number]]      What is the request in detail: requesting [facility] ORDERS       Can the clinic reply by [facility]:no      What Number to Call Back if not in [facility]:[phone]

## 2018-05-10 NOTE — TELEPHONE ENCOUNTER
Called patient, she is concerned about her aunt age 70 + has been diagnosed with breast cancer. Wanted to get a Mammogram.   Discussed screening guidelines, advised to come in for check up, and we can further explore family history.

## 2018-05-17 ENCOUNTER — OFFICE VISIT (OUTPATIENT)
Dept: OBSTETRICS AND GYNECOLOGY | Facility: CLINIC | Age: 33
End: 2018-05-17
Attending: OBSTETRICS & GYNECOLOGY
Payer: COMMERCIAL

## 2018-05-17 VITALS
WEIGHT: 180.31 LBS | DIASTOLIC BLOOD PRESSURE: 78 MMHG | BODY MASS INDEX: 28.3 KG/M2 | SYSTOLIC BLOOD PRESSURE: 122 MMHG | HEIGHT: 67 IN

## 2018-05-17 DIAGNOSIS — Z01.419 ENCOUNTER FOR GYNECOLOGICAL EXAMINATION WITHOUT ABNORMAL FINDING: Primary | ICD-10-CM

## 2018-05-17 DIAGNOSIS — F41.9 ANXIETY AND DEPRESSION: ICD-10-CM

## 2018-05-17 DIAGNOSIS — Z12.4 PAP SMEAR FOR CERVICAL CANCER SCREENING: ICD-10-CM

## 2018-05-17 DIAGNOSIS — F32.A ANXIETY AND DEPRESSION: ICD-10-CM

## 2018-05-17 PROCEDURE — 88175 CYTOPATH C/V AUTO FLUID REDO: CPT

## 2018-05-17 PROCEDURE — 3074F SYST BP LT 130 MM HG: CPT | Mod: CPTII,S$GLB,, | Performed by: OBSTETRICS & GYNECOLOGY

## 2018-05-17 PROCEDURE — 99395 PREV VISIT EST AGE 18-39: CPT | Mod: S$GLB,,, | Performed by: OBSTETRICS & GYNECOLOGY

## 2018-05-17 PROCEDURE — 3078F DIAST BP <80 MM HG: CPT | Mod: CPTII,S$GLB,, | Performed by: OBSTETRICS & GYNECOLOGY

## 2018-05-17 PROCEDURE — 99999 PR PBB SHADOW E&M-EST. PATIENT-LVL III: CPT | Mod: PBBFAC,,, | Performed by: OBSTETRICS & GYNECOLOGY

## 2018-05-17 RX ORDER — PAROXETINE HYDROCHLORIDE HEMIHYDRATE 12.5 MG/1
12.5 TABLET, FILM COATED, EXTENDED RELEASE ORAL DAILY
Qty: 30 TABLET | Refills: 6 | Status: SHIPPED | OUTPATIENT
Start: 2018-05-17 | End: 2018-10-04

## 2018-05-17 NOTE — PROGRESS NOTES
Subjective:       Patient ID: Maribel Valderrama is a 33 y.o. female.    Chief Complaint:  Well Woman      History of Present Illness  HPI    Maribel Valderrama is a 33 y.o. female  here for her annual GYN exam.  She complains of problems sleeping, anxiety and depression, feels stressed and overwhelmed. She has felt this way about work since before her baby but it has gotten worse since her delivery.  She describes her periods as regular, normal flow, lasting 5-7 days.   denies break through bleeding.   denies vaginal itching or irritation.  Denies vaginal discharge.  She is sexually active. She has had 1 partner for the past 9 years .  She uses condoms, and withdrawal for contraception.   History of abnormal pap: No  Last Pap: approximate date  and was normal  Feels Safe at Home.     History reviewed. No pertinent past medical history.  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[place] [address]     Social History Main Topics    Smoking status: Never Smoker    Smokeless tobacco: Never Used    Alcohol use No    Drug use: No    Sexual activity: Yes     Partners: Male     Birth control/ protection: None      Comment:  to roxanne since      Other Topics Concern    Not on file     Social History Narrative    No narrative on file     Family History   Problem Relation Age of Onset    Hypertension Mother     Hypertension Father     Breast cancer Other     Cancer Other         cervical cancer    Diabetes Maternal Uncle     Ovarian cancer Neg Hx     Stroke Neg  "Hx     Colon cancer Neg Hx     Celiac disease Neg Hx     Cirrhosis Neg Hx     Colon polyps Neg Hx     Crohn's disease Neg Hx     Esophageal cancer Neg Hx     Inflammatory bowel disease Neg Hx     Liver cancer Neg Hx     Liver disease Neg Hx     Rectal cancer Neg Hx     Stomach cancer Neg Hx     Ulcerative colitis Neg Hx      OB History      Para Term  AB Living    3 2 2   1 2    SAB TAB Ectopic Multiple Live Births    1     0 2          /78 (BP Location: Left arm, Patient Position: Sitting, BP Method: Medium (Manual))   Ht 5' 7" (1.702 m)   Wt 81.8 kg (180 lb 5.4 oz)   LMP 2018 (Exact Date)   Breastfeeding? No   BMI 28.24 kg/m²         GYN & OB History  Patient's last menstrual period was 2018 (exact date).   Date of Last Pap: 3/30/2015    OB History    Para Term  AB Living   3 2 2   1 2   SAB TAB Ectopic Multiple Live Births   1     0 2      # Outcome Date GA Lbr Zeus/2nd Weight Sex Delivery Anes PTL Lv   3 Term 17 39w2d / 00:22 3.65 kg (8 lb 0.8 oz) M Vag-Spont EPI N TONYA   2 SAB 16 18w5d 20:00 / 00:13 0.28 kg (9.9 oz) M Vag-Spont OTHER Y FD   1 Term 09/25/10 39w4d  3.697 kg (8 lb 2.4 oz) M Vag-Spont EPI  TONYA          Review of Systems  Review of Systems   Constitutional: Negative for activity change, appetite change, fatigue and unexpected weight change.   HENT: Negative.    Eyes: Negative for visual disturbance.   Respiratory: Negative for shortness of breath and wheezing.    Cardiovascular: Negative for chest pain, palpitations and leg swelling.   Gastrointestinal: Negative for abdominal pain, bloating and blood in stool.   Endocrine: Negative for diabetes and hair loss.   Genitourinary: Negative for decreased libido and dyspareunia.   Musculoskeletal: Negative for back pain and joint swelling.   Skin:  Negative for no acne and hair changes.   Neurological: Negative for headaches.   Hematological: Does not bruise/bleed easily. "   Psychiatric/Behavioral: Positive for depression and sleep disturbance. The patient is nervous/anxious.    Breast: Negative for breast pain and nipple discharge          Objective:    Physical Exam:   Constitutional: She is oriented to person, place, and time. She appears well-developed and well-nourished.    HENT:   Head: Normocephalic and atraumatic.    Eyes: EOM are normal. Pupils are equal, round, and reactive to light.    Neck: Normal range of motion. Neck supple.    Cardiovascular: Normal rate and regular rhythm.     Pulmonary/Chest: Effort normal and breath sounds normal.   BREASTS: Symmetrical, no skin changes or visible lesions.  No palpable masses, nipple discharge bilaterally.          Abdominal: Soft. Bowel sounds are normal.     Genitourinary: Pelvic exam was performed with patient supine.   Genitourinary Comments: PELVIC: Normal external genitalia without lesions.  Normal hair distribution.  Adequate perineal body, normal urethral meatus.  Vagina moist and well rugated without lesions or discharge.  Cervix pink, Parous, Pink without lesions, discharge or tenderness.  No significant cystocele or rectocele.  Bimanual exam shows uterus to be normal size, regular, mobile and nontender.  Adnexa without masses or tenderness.               Musculoskeletal: Normal range of motion and moves all extremeties.       Neurological: She is alert and oriented to person, place, and time.    Skin: Skin is warm and dry.    Psychiatric: She has a normal mood and affect.          Assessment:        1. Encounter for gynecological examination without abnormal finding    2. Pap smear for cervical cancer screening    3. Anxiety and depression               Plan:        1. Encounter for gynecological examination without abnormal finding  COUNSELING:  The patient was counseled today on osteoporosis prevention, calcium supplementation, and regular weight bearing exercise. The patient was also counseled today on ACS PAP  guidelines, with recommendations for yearly pelvic exams unless their uterus, cervix, and ovaries were removed for benign reasons; in that case, examinations every 3-5 years are recommended. The patient was also counseled regarding monthly breast self-examination, routine STD screening for at-risk populations, prophylactic immunizations for transmitted infections such as HPV, Pertussis, or Influenza as appropriate, and yearly mammograms when indicated by ACS guidelines. She was advised to see her primary care physician for all other health maintenance.   FOLLOW-UP with me for next routine visit.       2. Pap smear for cervical cancer screening    - Liquid-based pap smear, screening    3. Anxiety and depression    - paroxetine (PAXIL CR) 12.5 MG 24 hr tablet; Take 1 tablet (12.5 mg total) by mouth once daily.  Dispense: 30 tablet; Refill: 6       Follow-up in about 1 year (around 5/17/2019).

## 2018-09-12 ENCOUNTER — TELEPHONE (OUTPATIENT)
Dept: GASTROENTEROLOGY | Facility: CLINIC | Age: 33
End: 2018-09-12

## 2018-09-12 NOTE — TELEPHONE ENCOUNTER
----- Message from Caitlin Menezes sent at 9/12/2018  7:57 AM CDT -----  Contact: self  Pt would like to be seen today for back pain and constipation.  Please call her to let her know if you can put her on the schedule for today or this week sometime.    Pt can be reached at [phone]

## 2018-10-04 ENCOUNTER — OFFICE VISIT (OUTPATIENT)
Dept: GASTROENTEROLOGY | Facility: CLINIC | Age: 33
End: 2018-10-04
Payer: COMMERCIAL

## 2018-10-04 VITALS
BODY MASS INDEX: 27.41 KG/M2 | WEIGHT: 174.63 LBS | SYSTOLIC BLOOD PRESSURE: 134 MMHG | HEART RATE: 77 BPM | DIASTOLIC BLOOD PRESSURE: 89 MMHG | HEIGHT: 67 IN

## 2018-10-04 DIAGNOSIS — K59.04 CHRONIC IDIOPATHIC CONSTIPATION: Primary | ICD-10-CM

## 2018-10-04 PROCEDURE — 99999 PR PBB SHADOW E&M-EST. PATIENT-LVL III: CPT | Mod: PBBFAC,,, | Performed by: PHYSICIAN ASSISTANT

## 2018-10-04 PROCEDURE — 99213 OFFICE O/P EST LOW 20 MIN: CPT | Mod: S$GLB,,, | Performed by: PHYSICIAN ASSISTANT

## 2018-10-04 PROCEDURE — 3008F BODY MASS INDEX DOCD: CPT | Mod: CPTII,S$GLB,, | Performed by: PHYSICIAN ASSISTANT

## 2018-10-04 PROCEDURE — 3075F SYST BP GE 130 - 139MM HG: CPT | Mod: CPTII,S$GLB,, | Performed by: PHYSICIAN ASSISTANT

## 2018-10-04 PROCEDURE — 3079F DIAST BP 80-89 MM HG: CPT | Mod: CPTII,S$GLB,, | Performed by: PHYSICIAN ASSISTANT

## 2018-10-04 NOTE — PROGRESS NOTES
[facility] Consultation Note    Reason for Consult:  The encounter diagnosis was Chronic idiopathic constipation.    PCP: Primary Doctor No       HPI:  This is a 33 y.o. female here for evaluation of constipation.    Typically has a BMs every 3 days without abdominal discomfort.  Constipation start after the delivery of her youngest child who just turned 1years old.  Has worsened in the past  Few months  Bowel movement frequency -every 4-6 days  Stool consistency - bistol type 1  Blood in stools - no   Vaginal dileveries x 2  Laxatives used - stool softener tried taking miralax every few days without relief  Fiber- no relief, possibly worsened it  Drinks plenty of water  Constipating medications - no     Denies Fhx of IBD of GI cancers    ROS:  Constitutional: No fevers, chills, No weight loss  ENT: No allergies  CV: No chest pain  Pulm: No cough, No shortness of breath  Ophtho: No vision changes  GI: see HPI  Derm: No rash  Heme: No lymphadenopathy, No bruising  MSK: No arthritis  : No dysuria, No hematuria  Endo: No hot or cold intolerance  Neuro: No syncope, No seizure  Psych: No anxiety, No depression    Medical History:  has a past medical history of Chronic constipation.    Surgical History:  has a past surgical history that includes Ovarian Teratoma Removal (Right, 4/5/2010); Da Kory-assisted resection of complex left ovarian cyst x2 (Left, 06/22/2016); ROBOT ASSISTED LAPAROSCOPIC OVARIAN CYSTECTOMY (Left, 6/22/2016); ROBOTIC ASSISTED LAPAROSCOPIC LYSIS OF ADHESIONS (6/22/2016); and ESOPHAGOGASTRODUODENOSCOPY (EGD) (N/A, 5/13/2016).    Family History: family history includes Breast cancer in her other; Cancer in her other; Diabetes in her maternal uncle; Hypertension in her father and mother..     Social History:  reports that  has never smoked. she has never used smokeless tobacco. She reports that she does not drink alcohol or use drugs.    Review of patient's allergies  "indicates:  No Known Allergies    Current Outpatient Medications   Medication Sig    GAVILYTE-G 236-22.74-6.74 -5.86 gram suspension Take 4,000 mLs (4 L total) by mouth once. For delivery/ phone number verified w/ Pt at time of scheduling. for 1 dose     No current facility-administered medications for this visit.          Objective Findings:    Vital Signs:  /89   Pulse 77   Ht 5' 7" (1.702 m)   Wt 79.2 kg (174 lb 9.7 oz)   BMI 27.35 kg/m²   Body mass index is 27.35 kg/m².    Physical Exam:  General Appearance: Well appearing in no acute distress  Head:   Normocephalic, without obvious abnormality  Eyes:    No scleral icterus  ENT: Neck supple, Lips, mucosa, and tongue normal; teeth and gums normal  Lungs: CTA bilaterally in anterior and posterior fields, no wheezes, no crackles.  Heart:  Regular rate and rhythm, S1, S2 normal, no murmurs heard  Abdomen: Soft, non tender, non distended with positive bowel sounds in all four quadrants.   Extremities: no edema  Skin: No rash  Neurologic: AAO x 3      Labs:  Lab Results   Component Value Date    WBC 13.77 (H) 09/22/2017    HGB 12.6 09/22/2017    HCT 36.8 (L) 09/22/2017     09/22/2017     (H) 09/23/2017    AST 22 09/23/2017     09/23/2017    K 4.0 09/23/2017     (H) 09/23/2017    CREATININE 0.6 09/23/2017    BUN 9 09/23/2017    CO2 23 09/23/2017    TSH 1.495 03/23/2015    HGBA1C 5.2 02/03/2017       Imaging:    Endoscopy:    none  Assessment:  1. Chronic idiopathic constipation       32yo F with chronic constipation  X 1 yrs, worsening. No relief with taking miralax every few days    Recommendations:  1. Drink a bottle of magnesium citrate then start taking miralax 1 -2 capfuls nightly in 10 oz of water around 6pm until your colonoscopy    2. Schedule colonoscopy to rule out obstructive pathology    3. High fiber diet. Increase water intake    4. Start a probiotic    If no relief will Rx Linzess  Follow-up in about 2 months (around " 12/4/2018).      Order summary:  Orders Placed This Encounter    Case request GI: COLONOSCOPY         Thank you so much for allowing me to participate in the care of Maribel Bocanegra PA-C

## 2018-10-04 NOTE — PATIENT INSTRUCTIONS
Drink 64 oz of water    Start a lactose-free probiotic over the counter - try culturelle    Constipation prep:  Drink a bottle of magnesium citrate then start taking miralax 1 -2 capfuls nightly in 10 oz of water around 6pm until your colonoscopy    Message me if the miralax does not work and I will prescribe you a medicine      HIGH FIBER KEY POINTS:  1. Goal of 20-25 grams of fiber each day for women, 30-35 grams each day for men. Slowly build up to this goal as you may experience gas and bloating at first.  2. Take a fiber supplement to help reach this goal: Metamucil, Citrucel, Fibercon, Konsyl, or Psyllium  3. For Constipation: Finely ground psyllium husk (with or without flavoring or                                              Additives)   - To start: 1 teaspoon once a day in the morning with 8 oz of liquid    followed by an additional 8 ounce glass of water   - After a week: add a second teaspoon in the middle of the day   - After two weeks: add a third teaspoon at bedtime   - Follow each dose with another glass of water. Can add a splash of juice   or lemonade for taste.  3. Drink 6-8 glasses of water a day.  4. Try to do plant fiber (fruits and vegetables) over processed fibers (cereals and breads)     HIGH FIBER DIET  Fiber is present in all fruits, vegetables, cereals and grains. Fiber passes through the body undigested. A high fiber diet helps food move through the intestinal tract. The added bulk is helpful in preventing constipation. In persons with diverticulosis it serves to clean out the pouches along the colon wall while preventing new ones from forming. A high fiber diet may reduce the risk of colon cancer, decreases blood cholesterol and prevents high blood sugar in diabetics.    The foods listed below are high in fiber and should be included in your diet. If you are not used to high fiber foods, start with 1 or 2 foods from this list. Every 3-4 days add a new one to your diet until you are  eating 4 high fiber foods per day. This should give you 20-35 Gm of fiber/day. It is also important to drink a lot of water when you are on this diet (6-8 glasses a day). Water causes the fiber to swell and increases the benefit.  Add Fiber to Your Diet   Adding fiber to your diet can help relieve constipation by making stools softer and easier to pass. To increase your fiber intake, your doctor may recommend a bulking agent, such as psyllium. This is a high-fiber supplement available at most grocery and drugstores. Eating more fiber-rich foods will also help. There are two types of fiber:   Insoluble fiber is the main ingredient in bulking agents. Its also found in foods such as wheat bran, whole-grain breads, fresh fruits, and vegetables.   Soluble fiber is found in foods such as oat bran. Although soluble fiber is good for you, it may not ease constipation as much as foods high in insoluble fiber.    FOODS HIGH IN DIETARY FIBER:  BREADS: Made with 100% whole wheat flour; Jevon, wheat or rye crackers; tortillas, bran muffins. Whole grains, such as wheat bran, corn bran, and brown rice.  CEREALS: Whole grain cereal with bran (Chex, Raisin Bran, Corn Bran), oatmeal, rolled oats, granola, wheat flakes, brown rice  NUTS: Any nuts  FRUITS: All fresh fruits along with edible skins, (bananas, citrus fruit, mangoes, pears, prunes, raisins, apples, pineapple, apricot, melon, jams and marmalades), fruit juices (especially prune juice)  VEGETABLES: All types, preferably raw or lightly cooked: especially, celery, eggplant, potatoes,spinach, broccoli, brussel sprouts, winter squash, carrots, cauliflower, soybeans, lentils, fresh and dried beans of all kinds  OTHER: Popcorn, any spices.   Nuts and legumes, especially peanuts, lentils, and kidney beans.  Easy Ways to Add Fiber   The tips below offer some simple ways to add more high-fiber foods to your meals.   Start your day with a high-fiber breakfast. Eat a wheat bran  cereal along with a sliced banana. Or, try peanut butter on whole-wheat toast.   Eat carrot sticks for snacks. Theyre easy to prepare, taste great, and are low in calories.   Use whole-grain breads instead of white bread for sandwiches.   Eat fruits for treats. Try an apple and some raisins instead of a candy bar.  Vegetables  Artichoke, cooked 10.3  Asparagus - 2.8  Beans - 2  Broccoli, boiled 1 cup - 5.1  Flynn sprouts, cooked 1 cup - 4.1  Carrots - boiled 2.5, raw 4  Celery - 1  Corn - 4  Corn on the Cob - 5.9  Lettuce - 1  Peas (canned) - 4  Peas (dried) - 7.9  Green peas (cooked) - 8.8  Potato, with skin, baked - 3.0  Spinach - 4  Sweet potato - 3.4  Turnip greens, boiled 1 cup - 5.0  Sweet corn, cooked  1 cup  4.0  Tomato paste -1/4 cup -2.7  Yam - 2.7  Legumes, Nuts, and Seeds  Split peas, cooked  1 cup  16.3  Lentils, cooked 1 cup 15.6  Black beans, cooked 1 cup 15.0  Black eyed peas (1/2 cup) 4.2  Chick peas (1/2 cup) 5  Lima beans, cooked 1 cup  13.2  Baked beans, vegetarian, canned, cooked 1 cup 10.4  Sunflower seed kernels 1/4 cup 3.9  Almonds 1 ounce (23 nuts)  3.5  Pistachio nuts 1 ounce (49 nuts) 2.9  Pecans 1 ounce (19 halves) 2.7  Beans (lima,kidney,baked) - 10 (1/2 cup)  Refried beans -12 (1cup)  Lentils - 8  Soybeans (1/2 cup) 5  Fruit  Raspberries  1 cup  8.0  Pear, with skin - 5.5  Apple, with skin 4.4 (Juice = 0)  Banana - 3.1  Orange - 3.1  Strawberries (halves) 1 cup - 3.0  Figs, dried 2 medium - 1.6  Raisins 1 ounce (60 raisins) -1.0  Grapefruit - 1.4  Peach - 2  Kiwi - 5  Atkins - 3.7  Pineapple - 2  Cereal, Grains, and Pasta  Spaghetti, whole-wheat, cooked 1 cup 6.3  Barley, pearled, cooked 1 cup 6.0  Bran flakes 3/4 cup 5.3  Oat bran muffin 1 medium 5.2  Oatmeal, instant, cooked 1 cup 4.0  Popcorn, air-popped 3 cups 3.5  Brown rice, cooked  1 cup  3.5  Bread, rye 1 slice 1.9, pumpernickel - 2.1  Bagel - 1.6  Bread, whole-wheat or multigrain  1 slice 1.9  Fiber One - 14  100% Bran -  13  Raisin Bran - 3.5  All Bran Extra Fiber - 13

## 2018-10-05 DIAGNOSIS — Z12.11 SPECIAL SCREENING FOR MALIGNANT NEOPLASMS, COLON: Primary | ICD-10-CM

## 2018-10-05 PROBLEM — K59.04 CHRONIC IDIOPATHIC CONSTIPATION: Status: ACTIVE | Noted: 2018-10-05

## 2018-10-05 RX ORDER — POLYETHYLENE GLYCOL-3350 AND ELECTROLYTES 236; 6.74; 5.86; 2.97; 22.74 G/274.31G; G/274.31G; G/274.31G; G/274.31G; G/274.31G
4 POWDER, FOR SOLUTION ORAL ONCE
Qty: 4000 ML | Refills: 0 | Status: SHIPPED | OUTPATIENT
Start: 2018-10-05 | End: 2018-10-16

## 2018-10-09 ENCOUNTER — PATIENT MESSAGE (OUTPATIENT)
Dept: ADMINISTRATIVE | Facility: OTHER | Age: 33
End: 2018-10-09

## 2018-10-23 ENCOUNTER — ANESTHESIA EVENT (OUTPATIENT)
Dept: ENDOSCOPY | Facility: HOSPITAL | Age: 33
End: 2018-10-23
Payer: COMMERCIAL

## 2018-10-23 ENCOUNTER — ANESTHESIA (OUTPATIENT)
Dept: ENDOSCOPY | Facility: HOSPITAL | Age: 33
End: 2018-10-23
Payer: COMMERCIAL

## 2018-10-23 ENCOUNTER — HOSPITAL ENCOUNTER (OUTPATIENT)
Facility: HOSPITAL | Age: 33
Discharge: HOME | End: 2018-10-23
Attending: INTERNAL MEDICINE | Admitting: INTERNAL MEDICINE
Payer: COMMERCIAL

## 2018-10-23 VITALS
OXYGEN SATURATION: 100 % | RESPIRATION RATE: 15 BRPM | HEART RATE: 66 BPM | DIASTOLIC BLOOD PRESSURE: 96 MMHG | HEIGHT: 67 IN | TEMPERATURE: 98 F | WEIGHT: 170 LBS | SYSTOLIC BLOOD PRESSURE: 133 MMHG | BODY MASS INDEX: 26.68 KG/M2

## 2018-10-23 DIAGNOSIS — K59.00 CONSTIPATION: ICD-10-CM

## 2018-10-23 DIAGNOSIS — K59.04 CHRONIC IDIOPATHIC CONSTIPATION: Primary | ICD-10-CM

## 2018-10-23 LAB
B-HCG UR QL: NEGATIVE
CTP QC/QA: YES

## 2018-10-23 PROCEDURE — 25000003 PHARM REV CODE 250: Performed by: INTERNAL MEDICINE

## 2018-10-23 PROCEDURE — 81025 URINE PREGNANCY TEST: CPT | Performed by: INTERNAL MEDICINE

## 2018-10-23 PROCEDURE — E9220 PRA ENDO ANESTHESIA: HCPCS | Mod: ,,, | Performed by: NURSE ANESTHETIST, CERTIFIED REGISTERED

## 2018-10-23 PROCEDURE — 37000008 HC ANESTHESIA 1ST 15 MINUTES: Performed by: INTERNAL MEDICINE

## 2018-10-23 PROCEDURE — 37000009 HC ANESTHESIA EA ADD 15 MINS: Performed by: INTERNAL MEDICINE

## 2018-10-23 PROCEDURE — 45378 DIAGNOSTIC COLONOSCOPY: CPT | Mod: ,,, | Performed by: INTERNAL MEDICINE

## 2018-10-23 PROCEDURE — 45378 DIAGNOSTIC COLONOSCOPY: CPT | Performed by: INTERNAL MEDICINE

## 2018-10-23 PROCEDURE — 63600175 PHARM REV CODE 636 W HCPCS: Performed by: NURSE ANESTHETIST, CERTIFIED REGISTERED

## 2018-10-23 RX ORDER — SODIUM CHLORIDE 0.9 % (FLUSH) 0.9 %
3 SYRINGE (ML) INJECTION
Status: DISCONTINUED | OUTPATIENT
Start: 2018-10-23 | End: 2018-10-23 | Stop reason: HOSPADM

## 2018-10-23 RX ORDER — LIDOCAINE HCL/PF 100 MG/5ML
SYRINGE (ML) INTRAVENOUS
Status: DISCONTINUED | OUTPATIENT
Start: 2018-10-23 | End: 2018-10-23

## 2018-10-23 RX ORDER — SODIUM CHLORIDE 9 MG/ML
INJECTION, SOLUTION INTRAVENOUS CONTINUOUS
Status: DISCONTINUED | OUTPATIENT
Start: 2018-10-23 | End: 2018-10-23 | Stop reason: HOSPADM

## 2018-10-23 RX ORDER — PROPOFOL 10 MG/ML
VIAL (ML) INTRAVENOUS CONTINUOUS PRN
Status: DISCONTINUED | OUTPATIENT
Start: 2018-10-23 | End: 2018-10-23

## 2018-10-23 RX ORDER — PROPOFOL 10 MG/ML
VIAL (ML) INTRAVENOUS
Status: DISCONTINUED | OUTPATIENT
Start: 2018-10-23 | End: 2018-10-23

## 2018-10-23 RX ADMIN — LIDOCAINE HYDROCHLORIDE 50 MG: 20 INJECTION, SOLUTION INTRAVENOUS at 12:10

## 2018-10-23 RX ADMIN — SODIUM CHLORIDE: 0.9 INJECTION, SOLUTION INTRAVENOUS at 11:10

## 2018-10-23 RX ADMIN — PROPOFOL 200 MCG/KG/MIN: 10 INJECTION, EMULSION INTRAVENOUS at 12:10

## 2018-10-23 RX ADMIN — PROPOFOL 100 MG: 10 INJECTION, EMULSION INTRAVENOUS at 12:10

## 2018-10-23 NOTE — H&P
Short Stay Endoscopy History and Physical    PCP - Primary Doctor No  Referring Physician - Manuel Bocanegra PA-C  [address] [place] [address]    Procedure - Colonoscopy  ASA - per anesthesia  Mallampati - per anesthesia  History of Anesthesia problems - no  Family history Anesthesia problems -  no   Plan of anesthesia - General    HPI  33 y.o. female  Reason for procedure: screening      ROS:  Constitutional: No fevers, chills, No weight loss  CV: No chest pain  Pulm: No cough, No shortness of breath  GI: see HPI    Medical History:  has a past medical history of Chronic constipation.    Surgical History:  has a past surgical history that includes Ovarian Teratoma Removal (Right, 4/5/2010); Da Kory-assisted resection of complex left ovarian cyst x2 (Left, 06/22/2016); ROBOT ASSISTED LAPAROSCOPIC OVARIAN CYSTECTOMY (Left, 6/22/2016); ROBOTIC ASSISTED LAPAROSCOPIC LYSIS OF ADHESIONS (6/22/2016); and ESOPHAGOGASTRODUODENOSCOPY (EGD) (N/A, 5/13/2016).    Family History: family history includes Breast cancer in her other; Cancer in her other; Diabetes in her maternal uncle; Hypertension in her father and mother.. Otherwise no colon cancer, inflammatory bowel disease, or GI malignancies.    Social History:  reports that  has never smoked. she has never used smokeless tobacco. She reports that she does not drink alcohol or use drugs.    Review of patient's allergies indicates:  No Known Allergies    Medications:   No medications prior to admission.       Physical Exam:    Vital Signs:   Vitals:    10/23/18 1123   BP: (!) 150/71   Pulse: 86   Resp: 15   Temp: 97.5 °F (36.4 °C)       General Appearance: Well appearing in no acute distress  Abdomen: Soft, non tender, non distended with normal bowel sounds, no masses    Labs:  Lab Results   Component Value Date    WBC 13.77 (H) 09/22/2017    HGB 12.6 09/22/2017    HCT 36.8 (L) 09/22/2017     09/22/2017     (H) 09/23/2017    AST 22  09/23/2017     09/23/2017    K 4.0 09/23/2017     (H) 09/23/2017    CREATININE 0.6 09/23/2017    BUN 9 09/23/2017    CO2 23 09/23/2017    TSH 1.495 03/23/2015    HGBA1C 5.2 02/03/2017       I have explained the risks and benefits of this endoscopic procedure to the patient including but not limited to bleeding, inflammation, infection, perforation, and death.      Natalia Rocha MD

## 2018-10-23 NOTE — TRANSFER OF CARE
"Anesthesia Transfer of Care Note    Patient: Maribel Valderrama    Procedure(s) Performed: Procedure(s) (LRB):  COLONOSCOPY (N/A)    Patient location: PACU    Anesthesia Type: general    Transport from OR: Transported from OR on room air with adequate spontaneous ventilation    Post pain: adequate analgesia    Post assessment: no apparent anesthetic complications and tolerated procedure well    Post vital signs: stable    Level of consciousness: sedated    Nausea/Vomiting: no nausea/vomiting    Complications: none    Transfer of care protocol was followed      Last vitals:   Visit Vitals  BP (!) 150/71   Pulse 86   Temp 36.4 °C (97.5 °F)   Resp 15   Ht 5' 7" (1.702 m)   Wt 77.1 kg (170 lb)   SpO2 99%   Breastfeeding? No   BMI 26.63 kg/m²     "

## 2018-10-23 NOTE — ANESTHESIA POSTPROCEDURE EVALUATION
"Anesthesia Post Evaluation    Patient: Maribel Valderrama    Procedure(s) Performed: Procedure(s) (LRB):  COLONOSCOPY (N/A)    Final Anesthesia Type: general  Patient location during evaluation: PACU  Patient participation: Yes- Able to Participate  Level of consciousness: awake and alert  Post-procedure vital signs: reviewed and stable  Pain management: adequate  Airway patency: patent  PONV status at discharge: No PONV  Anesthetic complications: no      Cardiovascular status: blood pressure returned to baseline  Respiratory status: spontaneous ventilation and room air  Hydration status: euvolemic  Follow-up not needed.        Visit Vitals  BP (!) 134/96 (BP Location: Left arm, Patient Position: Lying)   Pulse 67   Temp 36.4 °C (97.5 °F) (Temporal)   Resp 16   Ht 5' 7" (1.702 m)   Wt 77.1 kg (170 lb)   SpO2 100%   Breastfeeding? No   BMI 26.63 kg/m²       Pain/Mouna Score: Pain Assessment Performed: Yes (10/23/2018 12:39 PM)  Presence of Pain: denies (10/23/2018 12:39 PM)  Pain Rating Prior to Med Admin: 0 (10/23/2018 12:39 PM)  Mouna Score: 10 (10/23/2018 12:39 PM)        "

## 2018-10-23 NOTE — ANESTHESIA PREPROCEDURE EVALUATION
10/23/2018  Maribel Valderrama is a 33 y.o., female.    Anesthesia Evaluation    I have reviewed the Patient Summary Reports.     I have reviewed the Medications.     Review of Systems  Hematology/Oncology:  Hematology Normal   Oncology Normal     EENT/Dental:EENT/Dental Normal   Cardiovascular:  Cardiovascular Normal     Pulmonary:  Pulmonary Normal    Renal/:  Renal/ Normal     Hepatic/GI:  Hepatic/GI Normal    Musculoskeletal:  Musculoskeletal Normal    Neurological:  Neurology Normal    Endocrine:  Endocrine Normal    Dermatological:  Skin Normal    Psych:  Psychiatric Normal              Anesthesia Plan  Type of Anesthesia, risks & benefits discussed:  Anesthesia Type:  general  Patient's Preference: General  Intra-op Monitoring Plan: standard ASA monitors  Intra-op Monitoring Plan Comments:   Post Op Pain Control Plan:   Post Op Pain Control Plan Comments:   Induction:   IV  Beta Blocker:  Patient is not currently on a Beta-Blocker (No further documentation required).       Informed Consent: Patient understands risks and agrees with Anesthesia plan.  Questions answered. Anesthesia consent signed with patient.  ASA Score: 1     Day of Surgery Review of History & Physical: I have interviewed and examined the patient. I have reviewed the patient's H&P dated:    H&P update referred to the surgeon.         Ready For Surgery From Anesthesia Perspective.

## 2018-10-23 NOTE — DISCHARGE INSTRUCTIONS

## 2018-10-23 NOTE — [employer] PATIENT INSTRUCTIONS
Discharge Summary/Instructions after an Endoscopic Procedure  Patient Name: Maribel Valderrama  Patient MRN: [id number]  Patient YOB: 1985  Tuesday, October 23, 2018  Natalia Rocha MD  RESTRICTIONS:  During your procedure today, you received medications for sedation.  These   medications may affect your judgment, balance and coordination.  Therefore,   for 24 hours, you have the following restrictions:   - DO NOT drive a car, operate machinery, make legal/financial decisions,   sign important papers or drink alcohol.    ACTIVITY:  Today: no heavy lifting, straining or running due to procedural   sedation/anesthesia.  The following day: return to full activity including work.  DIET:  Eat and drink normally unless instructed otherwise.     TREATMENT FOR COMMON SIDE EFFECTS:  - Mild abdominal pain, nausea, belching, bloating or excessive gas:  rest,   eat lightly and use a heating pad.  - Sore Throat: treat with throat lozenges and/or gargle with warm salt   water.  - Because air was used during the procedure, expelling large amounts of air   from your rectum or belching is normal.  - If a bowel prep was taken, you may not have a bowel movement for 1-3 days.    This is normal.  SYMPTOMS TO WATCH FOR AND REPORT TO YOUR PHYSICIAN:  1. Abdominal pain or bloating, other than gas cramps.  2. Chest pain.  3. Back pain.  4. Signs of infection such as: chills or fever occurring within 24 hours   after the procedure.  5. Rectal bleeding, which would show as bright red, maroon, or black stools.   (A tablespoon of blood from the rectum is not serious, especially if   hemorrhoids are present.)  6. Vomiting.  7. Weakness or dizziness.  GO DIRECTLY TO THE NEAREST EMERGENCY ROOM IF YOU HAVE ANY OF THE FOLLOWING:      Difficulty breathing              Chills and/or fever over 101 F   Persistent vomiting and/or vomiting blood   Severe abdominal pain   Severe chest pain   Black, tarry stools   Bleeding- more than one  tablespoon   Any other symptom or condition that you feel may need urgent attention  Your doctor recommends these additional instructions:  If any biopsies were taken, your doctors clinic will contact you in 1 to 2   weeks with any results.  - Discharge patient to home.   - Patient has a contact number available for emergencies.  The signs and   symptoms of potential delayed complications were discussed with the   patient.  Return to normal activities tomorrow.  Written discharge   instructions were provided to the patient.   - Resume previous diet.   - Continue present medications.   - Return to referring physician.   - Repeat colonoscopy for screening purposes.   - Return to GI clinic.   For questions, problems or results please call your physician - Natalia Rocha MD at Work:  [phone].  [facility], EMERGENCY ROOM PHONE NUMBER: [phone]  IF A COMPLICATION OR EMERGENCY SITUATION ARISES AND YOU ARE UNABLE TO REACH   YOUR PHYSICIAN - GO DIRECTLY TO THE EMERGENCY ROOM.  Natalia Rocha MD  10/23/2018 12:19:42 PM  This report has been verified and signed electronically.  [employer]

## 2018-10-30 ENCOUNTER — TELEPHONE (OUTPATIENT)
Dept: ENDOSCOPY | Facility: HOSPITAL | Age: 33
End: 2018-10-30

## 2019-11-07 ENCOUNTER — OFFICE VISIT (OUTPATIENT)
Dept: OBSTETRICS AND GYNECOLOGY | Facility: CLINIC | Age: 34
End: 2019-11-07
Payer: COMMERCIAL

## 2019-11-07 ENCOUNTER — LAB VISIT (OUTPATIENT)
Dept: LAB | Facility: HOSPITAL | Age: 34
End: 2019-11-07
Attending: OBSTETRICS & GYNECOLOGY
Payer: COMMERCIAL

## 2019-11-07 VITALS
HEIGHT: 67 IN | WEIGHT: 174.25 LBS | DIASTOLIC BLOOD PRESSURE: 85 MMHG | BODY MASS INDEX: 27.35 KG/M2 | SYSTOLIC BLOOD PRESSURE: 134 MMHG

## 2019-11-07 DIAGNOSIS — R53.83 FATIGUE, UNSPECIFIED TYPE: ICD-10-CM

## 2019-11-07 DIAGNOSIS — N89.8 VAGINAL DISCHARGE: ICD-10-CM

## 2019-11-07 DIAGNOSIS — Z01.419 WELL WOMAN EXAM WITH ROUTINE GYNECOLOGICAL EXAM: Primary | ICD-10-CM

## 2019-11-07 LAB
ALBUMIN SERPL BCP-MCNC: 4.1 G/DL (ref 3.5–5.2)
ALP SERPL-CCNC: 60 U/L (ref 55–135)
ALT SERPL W/O P-5'-P-CCNC: 13 U/L (ref 10–44)
ANION GAP SERPL CALC-SCNC: 5 MMOL/L (ref 8–16)
AST SERPL-CCNC: 13 U/L (ref 10–40)
BASOPHILS # BLD AUTO: 0.03 K/UL (ref 0–0.2)
BASOPHILS NFR BLD: 0.6 % (ref 0–1.9)
BILIRUB SERPL-MCNC: 0.3 MG/DL (ref 0.1–1)
BUN SERPL-MCNC: 13 MG/DL (ref 6–20)
CALCIUM SERPL-MCNC: 9 MG/DL (ref 8.7–10.5)
CHLORIDE SERPL-SCNC: 105 MMOL/L (ref 95–110)
CO2 SERPL-SCNC: 28 MMOL/L (ref 23–29)
CREAT SERPL-MCNC: 0.8 MG/DL (ref 0.5–1.4)
DIFFERENTIAL METHOD: ABNORMAL
EOSINOPHIL # BLD AUTO: 0.1 K/UL (ref 0–0.5)
EOSINOPHIL NFR BLD: 1.7 % (ref 0–8)
ERYTHROCYTE [DISTWIDTH] IN BLOOD BY AUTOMATED COUNT: 12.2 % (ref 11.5–14.5)
EST. GFR  (AFRICAN AMERICAN): >60 ML/MIN/1.73 M^2
EST. GFR  (NON AFRICAN AMERICAN): >60 ML/MIN/1.73 M^2
GLUCOSE SERPL-MCNC: 86 MG/DL (ref 70–110)
HCT VFR BLD AUTO: 37.7 % (ref 37–48.5)
HGB BLD-MCNC: 12.1 G/DL (ref 12–16)
IMM GRANULOCYTES # BLD AUTO: 0.01 K/UL (ref 0–0.04)
IMM GRANULOCYTES NFR BLD AUTO: 0.2 % (ref 0–0.5)
LYMPHOCYTES # BLD AUTO: 2.3 K/UL (ref 1–4.8)
LYMPHOCYTES NFR BLD: 42.4 % (ref 18–48)
MCH RBC QN AUTO: 30.1 PG (ref 27–31)
MCHC RBC AUTO-ENTMCNC: 32.1 G/DL (ref 32–36)
MCV RBC AUTO: 94 FL (ref 82–98)
MONOCYTES # BLD AUTO: 0.4 K/UL (ref 0.3–1)
MONOCYTES NFR BLD: 7.8 % (ref 4–15)
NEUTROPHILS # BLD AUTO: 2.5 K/UL (ref 1.8–7.7)
NEUTROPHILS NFR BLD: 47.3 % (ref 38–73)
NRBC BLD-RTO: 0 /100 WBC
PLATELET # BLD AUTO: 330 K/UL (ref 150–350)
PMV BLD AUTO: 8.6 FL (ref 9.2–12.9)
POTASSIUM SERPL-SCNC: 4.4 MMOL/L (ref 3.5–5.1)
PROT SERPL-MCNC: 7.3 G/DL (ref 6–8.4)
RBC # BLD AUTO: 4.02 M/UL (ref 4–5.4)
SODIUM SERPL-SCNC: 138 MMOL/L (ref 136–145)
TSH SERPL DL<=0.005 MIU/L-ACNC: 1.82 UIU/ML (ref 0.4–4)
WBC # BLD AUTO: 5.36 K/UL (ref 3.9–12.7)

## 2019-11-07 PROCEDURE — 3079F DIAST BP 80-89 MM HG: CPT | Mod: CPTII,S$GLB,, | Performed by: OBSTETRICS & GYNECOLOGY

## 2019-11-07 PROCEDURE — 99395 PR PREVENTIVE VISIT,EST,18-39: ICD-10-PCS | Mod: S$GLB,,, | Performed by: OBSTETRICS & GYNECOLOGY

## 2019-11-07 PROCEDURE — 99395 PREV VISIT EST AGE 18-39: CPT | Mod: S$GLB,,, | Performed by: OBSTETRICS & GYNECOLOGY

## 2019-11-07 PROCEDURE — 3075F SYST BP GE 130 - 139MM HG: CPT | Mod: CPTII,S$GLB,, | Performed by: OBSTETRICS & GYNECOLOGY

## 2019-11-07 PROCEDURE — 99999 PR PBB SHADOW E&M-EST. PATIENT-LVL III: ICD-10-PCS | Mod: PBBFAC,,, | Performed by: OBSTETRICS & GYNECOLOGY

## 2019-11-07 PROCEDURE — 87801 DETECT AGNT MULT DNA AMPLI: CPT

## 2019-11-07 PROCEDURE — 87481 CANDIDA DNA AMP PROBE: CPT | Mod: 59

## 2019-11-07 PROCEDURE — 80053 COMPREHEN METABOLIC PANEL: CPT

## 2019-11-07 PROCEDURE — 84443 ASSAY THYROID STIM HORMONE: CPT

## 2019-11-07 PROCEDURE — 99999 PR PBB SHADOW E&M-EST. PATIENT-LVL III: CPT | Mod: PBBFAC,,, | Performed by: OBSTETRICS & GYNECOLOGY

## 2019-11-07 PROCEDURE — 85025 COMPLETE CBC W/AUTO DIFF WBC: CPT

## 2019-11-07 PROCEDURE — 87661 TRICHOMONAS VAGINALIS AMPLIF: CPT

## 2019-11-07 PROCEDURE — 36415 COLL VENOUS BLD VENIPUNCTURE: CPT

## 2019-11-07 PROCEDURE — 3079F PR MOST RECENT DIASTOLIC BLOOD PRESSURE 80-89 MM HG: ICD-10-PCS | Mod: CPTII,S$GLB,, | Performed by: OBSTETRICS & GYNECOLOGY

## 2019-11-07 PROCEDURE — 3075F PR MOST RECENT SYSTOLIC BLOOD PRESS GE 130-139MM HG: ICD-10-PCS | Mod: CPTII,S$GLB,, | Performed by: OBSTETRICS & GYNECOLOGY

## 2019-11-07 NOTE — PATIENT INSTRUCTIONS
Prevention Guidelines, Women Ages 18 to 39  Screening tests and vaccines are an important part of managing your health. Health counseling is essential, too. Below are guidelines for these, for women ages 18 to 39. Talk with your healthcare provider to make sure youre up-to-date on what you need.  Screening Who needs it How often   Alcohol misuse All women in this age group At routine exams   Blood pressure All women in this age group Every 2 years if your blood pressure is less than 120/80 mm Hg; yearly if your systolic blood pressure is 120 to 139 mm Hg, or your diastolic blood pressure reading is 80 to 89 mm Hg   Breast cancer All women in this age group should talk with their healthcare providers about the need for clinical breast exams (CBE)1 Clinical breast exam every 3 years1   Cervical cancer Women ages 21 and older Women between ages 21 and 29 should have a Pap test every 3 years; women between ages 30 and 65 are advised to have a Pap test plus an HPV test every 5 years   Chlamydia Sexually active women ages 24 and younger, and women at increased risk for infection Every 3 years if you're at risk or have symptoms   Depression All women in this age group At routine exams   Diabetes mellitus, type 2 Adults with no symptoms who are overweight or obese and have 1 or more other risk factors for diabetes At least every 3 years   Gonorrhea Sexually active women at increased risk for infection At routine exams   Hepatitis C Anyone at increased risk At routine exams   HIV All women At routine exams   Obesity All women in this age group At routine exams   Syphilis Women at increased risk for infection - talk with your healthcare provider At routine exams   Tuberculosis Women at increased risk for infection - talk with your healthcare provider Ask your healthcare provider   Vision All women in this age group At least 1 complete exam in your 20s, and 2 in your 30s   Vaccine2 Who needs it How often   Chickenpox  (varicella) All women in this age group who have no record of this infection or vaccine 2 doses; the second dose should be given 4 to 8 weeks after the first dose   Hepatitis A Women at increased risk for infection - talk with your healthcare provider 2 doses given at least 6 months apart   Hepatitis B Women at increased risk for infection - talk with your healthcare provider 3 doses over 6 months; second dose should be given 1 month after the first dose; the third dose should be given at least 2 months after the second dose and at least 4 months after the first dose   Haemophilus influenzae Type B (HIB) Women at increased risk for infection - talk with your healthcare provider 1 to 3 doses   Human papillomavirus (HPV) All women in this age group up to age 26 3 doses; the second dose should be given 1 to 2 months after the first dose and the third dose given 6 months after the first dose   Influenza (flu) All women in this age group Once a year   Measles, mumps, rubella (MMR) All women in this age group who have no record of these infections or vaccines 1 or 2 doses   Meningococcal Women at increased risk for infection - talk with your healthcare provider 1 or more doses   Pneumococcal conjugate vaccine (PCV13) and pneumococcal polysaccharide vaccine (PPSV23) Women at increased risk for infection - talk with your healthcare provider PCV13: 1 dose ages 19 to 65 (protects against 13 types of pneumococcal bacteria)  PPSV23: 1 to 2 doses through age 64, or 1 dose at 65 or older (protects against 23 types of pneumococcal bacteria)      Tetanus/diphtheria/pertussis (Td/Tdap) booster All women in this age group Td every 10 years, or a one-time dose of Tdap instead of a Td booster after age 18, then Td every 10 years   Counseling Who needs it How often   BRCA gene mutation testing for breast and ovarian cancer susceptibility Women with increased risk for having gene mutation When your risk is known   Breast cancer and  chemoprevention Women at high risk for breast cancer When your risk is known   Diet and exercise Women who are overweight or obese When diagnosed, and then at routine exams   Domestic violence Women at the age in which they are able to have children At routine exams   Sexually transmitted infection prevention Women who are sexually active At routine exams   Skin cancer Prevention of skin cancer in fair-skinned adults through age 24 At routine exams   Use of tobacco and the health effects it can cause All women in this age group Every visit   1According to the ACS, women ages 20 to 39 years should have a clinical breast exam (CBE) as part of their routine health exam every 3 years. Breast self-exams are an option for women starting in their 20s. But the  USPSTF does not recommend CBE.  2Those who are 18 years old and not up-to-date on their childhood vaccines should get all appropriate catch-up vaccines recommended by the CDC.  Date Last Reviewed: 8/27/2015  © 6750-1630 [employer]. [address] [place] [address], [place] [address]. All rights reserved. This information is not intended as a substitute for professional medical care. Always follow your healthcare professional's instructions.

## 2019-11-07 NOTE — PROGRESS NOTES
History & Physical  Gynecology      SUBJECTIVE:     Chief Complaint: Well Woman       History of Present Illness:  Annual Exam-Premenopausal  Ms. Valderrama is a 33 y/o female who presents for annual exam. The patient has no complaints today. She reports that she used to have a fishy smelling discharge but she began using Vagsil and no longer has the d/c. Patient also c/o being fatigue and having numbing back pain between her shoulder blades.     The patient is sexually active with her . GYN screening history: last pap: approximate date  and was normal. The patient wears seatbelts: yes. The patient participates in regular exercise: no. Has the patient ever been transfused or tattooed?: no. The patient reports that there is not domestic violence in her life.      Review of patient's allergies indicates:  No Known Allergies    Past Medical History:   Diagnosis Date    Chronic constipation      Past Surgical History:   Procedure Laterality Date    COLONOSCOPY N/A 10/23/2018    Procedure: COLONOSCOPY;  Surgeon: Natalia Rocha MD;  Location: [facility] ([address] [place] [address]);  Service: Endoscopy;  Laterality: N/A;  constipation prep    Da Kory-assisted resection of complex left ovarian cyst x2 Left 2016    DONNA done at time of surgery -Dermoid and Hemorrhagic ovarian cyst    Ovarian Teratoma Removal Right 2010    15 weeks gestation, DERMOID RIGHT      OB History        3    Para   2    Term   2            AB   1    Living   2       SAB   1    TAB        Ectopic        Multiple   0    Live Births   2               Family History   Problem Relation Age of Onset    Hypertension Mother     Hypertension Father     Breast cancer Other     Cancer Other         cervical cancer    Diabetes Maternal Uncle     Ovarian cancer Neg Hx     Stroke Neg Hx     Colon cancer Neg Hx     Celiac disease Neg Hx     Cirrhosis Neg Hx     Colon polyps Neg Hx     Crohn's disease Neg Hx     Esophageal cancer Neg  Hx     Inflammatory bowel disease Neg Hx     Liver cancer Neg Hx     Liver disease Neg Hx     Rectal cancer Neg Hx     Stomach cancer Neg Hx     Ulcerative colitis Neg Hx      Social History     Tobacco Use    Smoking status: Never Smoker    Smokeless tobacco: Never Used   Substance Use Topics    Alcohol use: No    Drug use: No       No current outpatient medications on file.     No current facility-administered medications for this visit.      Review of Systems:  Review of Systems   Constitutional: Positive for fatigue. Negative for chills and unexpected weight change.   Eyes: Negative for visual disturbance.   Respiratory: Negative for cough and wheezing.    Cardiovascular: Negative for chest pain and palpitations.   Gastrointestinal: Negative for abdominal pain, nausea and vomiting.   Genitourinary: Positive for vaginal discharge. Negative for dysuria, frequency, hematuria, pelvic pain, vaginal bleeding and vaginal pain.   Neurological: Negative for headaches.     OBJECTIVE:     Physical Exam:  Physical Exam   Constitutional: She is oriented to person, place, and time. She appears well-developed and well-nourished.   Cardiovascular: Normal rate.   Pulmonary/Chest: Effort normal. No respiratory distress. No breast swelling, tenderness, discharge or bleeding. Breasts are symmetrical.   Abdominal: Soft. She exhibits no distension. There is no tenderness.   Genitourinary: No breast swelling, tenderness, discharge or bleeding. Vaginal discharge found.   Genitourinary Comments: Thick white vaginal d/c. Uterus 6-8 w size, midline and mobile   Neurological: She is alert and oriented to person, place, and time.   Skin: Skin is warm and dry.   Psychiatric: She has a normal mood and affect.   Nursing note and vitals reviewed.        ASSESSMENT:       ICD-10-CM ICD-9-CM    1. Well woman exam with routine gynecological exam Z01.419 V72.31 POCT urine pregnancy   2. Fatigue, unspecified type R53.83 780.79 CBC auto  differential      Comprehensive metabolic panel      TSH   3. Vaginal discharge N89.8 623.5 Vaginosis Screen by DNA Probe     Plan:      Maribel was seen today for well woman.    Diagnoses and all orders for this visit:    Well woman exam with routine gynecological exam  -     POCT urine pregnancy  - Pap smear up to date as of 2018  - Declines birth control    Fatigue, unspecified type  -     CBC auto differential; Future  -     Comprehensive metabolic panel; Future  -     TSH; Future    Vaginal discharge  -     Vaginosis Screen by DNA Probe  - Discussed with patient how douching/body wash/scented soaps/bubble baths can shift her vaginal bernadette and natural vaginal pH which can cause overgrowth of yeast or gardnerella which is the bacteria that causes BV. Discussed with patient to use only mild, unscented soaps such as Dove unscented and Dial and water to wash her vagina.         Orders Placed This Encounter   Procedures    Vaginosis Screen by DNA Probe    CBC auto differential    Comprehensive metabolic panel    TSH    POCT urine pregnancy       Follow up in about 1 year (around 11/7/2020) for WWE.    Counseling time: 15 minutes    Richie Velazco

## 2019-11-08 LAB
BACTERIAL VAGINOSIS DNA: NEGATIVE
CANDIDA GLABRATA DNA: NEGATIVE
CANDIDA KRUSEI DNA: NEGATIVE
CANDIDA RRNA VAG QL PROBE: NEGATIVE
T VAGINALIS RRNA GENITAL QL PROBE: NEGATIVE

## 2019-12-30 ENCOUNTER — TELEPHONE (OUTPATIENT)
Dept: ORTHOPEDICS | Facility: CLINIC | Age: 34
End: 2019-12-30

## 2019-12-30 NOTE — TELEPHONE ENCOUNTER
Appointment scheduled for 1- with Dr Michael.     Laura Hernandez LPN     ----- Message from Suly Nowak sent at 12/30/2019  1:50 PM CST -----  Contact: JOLENE GUTIERREZ [[id number]]   Name of Who is Calling:     What is the request in detail:  Patient request call back in reference to sooner appointment for left /swollen wrist Please contact to further discuss and advise      Can the clinic reply by [facility]: no     [facility]  [phone]

## 2020-01-03 DIAGNOSIS — M25.532 LEFT WRIST PAIN: Primary | ICD-10-CM

## 2020-01-08 ENCOUNTER — TELEPHONE (OUTPATIENT)
Dept: ORTHOPEDICS | Facility: CLINIC | Age: 35
End: 2020-01-08

## 2020-01-08 NOTE — TELEPHONE ENCOUNTER
Left a voicemail for patient to return my phone call in regards to scheduling an appointment/x-ray at their convenience. Provided the clinic's phone number.

## 2020-03-15 NOTE — ANESTHESIA PROCEDURE NOTES
CSE    Patient location during procedure: OB  Start time: 9/13/2017 4:06 PM  Timeout: 9/13/2017 3:56 PM  End time: 9/13/2017 4:26 PM  Staffing  Anesthesiologist: HO ERNST  Resident/CRNA: PAKO ESPINAL  Performed: resident/CRNA   Preanesthetic Checklist  Completed: patient identified, site marked, surgical consent, pre-op evaluation, timeout performed, IV checked, risks and benefits discussed and monitors and equipment checked  CSE  Patient position: sitting  Prep: ChloraPrep  Patient monitoring: heart rate, continuous pulse ox and frequent blood pressure checks  Approach: midline  Spinal Needle  Needle type: pencil-tip   Needle gauge: 25 G  Needle length: 5 in  Epidural Needle  Injection technique: JACEK saline  Needle type: Tuohy   Needle gauge: 17 G  Needle length: 3.5 in  Needle insertion depth: 6 cm  Location: L3-4  Needle localization: anatomical landmarks  Catheter  Catheter type: SignStorey  Catheter size: 19 G  Catheter at skin depth: 10 cm  Test dose: lidocaine 1.5% with Epi 1-to-200,000  Additional Documentation: incremental injection, negative aspiration for CSF and negative aspiration for heme  Assessment  Intrathecal Medications:  Bolus administered: 1 mL of 0.25 bupivacaine  administered: primary anesthetic and 10 mcg of  fentanyl  Additional Notes  First attempt at l2/3, second attempt L3/4 epidural space found but unable to pass catheter. Performed dural puncture with +csf. Since unable to pass cath decided to dose spinal and redirect to obtain loss again at same space. This time able to pass epidural catheter.              
Epidural    Patient location during procedure: OB   Reason for block: primary anesthetic   Diagnosis: iup   Start time: 9/13/2017 4:02 PM  Timeout: 9/13/2017 3:54 PM  End time: 9/13/2017 4:25 PM  Staffing  Anesthesiologist: HO ERNST  Resident/CRNA: PAKO ESPINAL  Performed: resident/CRNA   Preanesthetic Checklist  Completed: patient identified, site marked, surgical consent, pre-op evaluation, timeout performed, IV checked, risks and benefits discussed, monitors and equipment checked, anesthesia consent given, hand hygiene performed and patient being monitored  Preparation  Patient position: sitting  Prep: ChloraPrep  Patient monitoring: Pulse Ox and Blood Pressure  Epidural  Skin Anesthetic: lidocaine 1%  Skin Wheal: 3 mL  Administration type: continuous  Approach: midline  Interspace: L3-4  Injection technique: JACEK air and JACEK saline  Needle and Epidural Catheter  Needle type: Tuohy   Needle gauge: 17  Needle length: 3.5 inches  Needle insertion depth: 6 cm  Catheter type: springwound and multi-orifice  Catheter size: 19 G  Catheter at skin depth: 10 cm  Test dose: 3 mL of lidocaine 1.5% with Epi 1-to-200,000  Additional Documentation: incremental injection, negative aspiration for heme and CSF, no paresthesia on injection, no signs/symptoms of IV or SA injection, no significant pain on injection and no significant complaints from patient  Needle localization: anatomical landmarks  Medications: 0.125% bupivacaine          
16-Mar-2020 13:26

## 2020-07-28 ENCOUNTER — OFFICE VISIT (OUTPATIENT)
Dept: URGENT CARE | Facility: CLINIC | Age: 35
End: 2020-07-28
Payer: COMMERCIAL

## 2020-07-28 VITALS
OXYGEN SATURATION: 99 % | BODY MASS INDEX: 25.11 KG/M2 | HEIGHT: 67 IN | RESPIRATION RATE: 17 BRPM | TEMPERATURE: 98 F | WEIGHT: 160 LBS | HEART RATE: 99 BPM | SYSTOLIC BLOOD PRESSURE: 124 MMHG | DIASTOLIC BLOOD PRESSURE: 78 MMHG

## 2020-07-28 DIAGNOSIS — J02.9 SORE THROAT: ICD-10-CM

## 2020-07-28 DIAGNOSIS — Z20.822 EXPOSURE TO COVID-19 VIRUS: ICD-10-CM

## 2020-07-28 DIAGNOSIS — R09.81 NASAL SINUS CONGESTION: ICD-10-CM

## 2020-07-28 DIAGNOSIS — R51.9 SINUS HEADACHE: Primary | ICD-10-CM

## 2020-07-28 DIAGNOSIS — J30.9 ALLERGIC RHINITIS, UNSPECIFIED SEASONALITY, UNSPECIFIED TRIGGER: ICD-10-CM

## 2020-07-28 PROCEDURE — 99214 OFFICE O/P EST MOD 30 MIN: CPT | Mod: S$GLB,,, | Performed by: NURSE PRACTITIONER

## 2020-07-28 PROCEDURE — 99214 PR OFFICE/OUTPT VISIT, EST, LEVL IV, 30-39 MIN: ICD-10-PCS | Mod: S$GLB,,, | Performed by: NURSE PRACTITIONER

## 2020-07-28 PROCEDURE — U0003 INFECTIOUS AGENT DETECTION BY NUCLEIC ACID (DNA OR RNA); SEVERE ACUTE RESPIRATORY SYNDROME CORONAVIRUS 2 (SARS-COV-2) (CORONAVIRUS DISEASE [COVID-19]), AMPLIFIED PROBE TECHNIQUE, MAKING USE OF HIGH THROUGHPUT TECHNOLOGIES AS DESCRIBED BY CMS-2020-01-R: HCPCS

## 2020-07-28 RX ORDER — FLUTICASONE PROPIONATE 50 MCG
1 SPRAY, SUSPENSION (ML) NASAL DAILY
Qty: 15.8 ML | Refills: 1 | Status: SHIPPED | OUTPATIENT
Start: 2020-07-28 | End: 2020-10-21

## 2020-07-28 NOTE — LETTER
[address] [place] [address] ? [place], [id number] ? Phone [phone] ? Fax [phone]           Return to Work/School    Patient: Maribel Valderrama  YOB: 1985   Date: 07/28/2020      To Whom It May Concern:     Maribel Valderrama was in contact with/seen in my office on 07/28/2020.      Maribel Valderrama has had the COVID-19 pcr test. The test has been completed and is pending results at this time. During this time the employee is not able to work and should be quarantined per the Centers for Disease Control timelines. The test results are taking about 3 days to return.      If you have any questions or concerns, or if I can be of further assistance, please do not hesitate to contact me.     Sincerely,          Sydni Cobb NP

## 2020-07-28 NOTE — PROGRESS NOTES
"Subjective:       Patient ID: Maribel Valderrama is a 35 y.o. female.    Vitals:  height is 5' 7" (1.702 m) and weight is 72.6 kg (160 lb). Her temperature is 98.2 °F (36.8 °C). Her blood pressure is 124/78 and her pulse is 99. Her respiration is 17 and oxygen saturation is 99%.     Chief Complaint: Headache and Sinus Problem    Patient presents to clinic with sneezing/itchy throat/sinus headache x 3 days.  She is an RN at an outside hospital.  Concerned about covid.     Headache   This is a new problem. The current episode started in the past 7 days. The problem occurs constantly. The problem has been unchanged. The pain is located in the frontal region. The pain does not radiate. The quality of the pain is described as aching. The pain is at a severity of 4/10. The pain is mild. Associated symptoms include a sore throat. Pertinent negatives include no coughing, ear pain, eye redness, fever, nausea, sinus pressure or vomiting.   Sinus Problem  Associated symptoms include headaches and a sore throat. Pertinent negatives include no chills, congestion, coughing, diaphoresis, ear pain, shortness of breath or sinus pressure.       Constitution: Negative for chills, sweating, fatigue and fever.   HENT: Positive for sinus pain and sore throat. Negative for ear pain, congestion, sinus pressure and voice change.    Neck: Negative for painful lymph nodes.   Eyes: Negative for eye redness.   Respiratory: Negative for chest tightness, cough, sputum production, bloody sputum, COPD, shortness of breath, stridor, wheezing and asthma.    Gastrointestinal: Negative for nausea and vomiting.   Musculoskeletal: Negative for muscle ache.   Skin: Negative for rash.   Allergic/Immunologic: Negative for seasonal allergies and asthma.   Neurological: Positive for headaches.   Hematologic/Lymphatic: Negative for swollen lymph nodes.       Objective:      Physical Exam   Constitutional: She is oriented to person, place, and time. She " appears well-developed. She is cooperative.  Non-toxic appearance. She does not appear ill. No distress.   HENT:   Head: Normocephalic and atraumatic.   Ears:   Right Ear: Hearing, external ear and ear canal normal. Tympanic membrane is bulging.   Left Ear: Hearing, tympanic membrane, external ear and ear canal normal.   Nose: Mucosal edema and rhinorrhea present. No purulent discharge or nasal deformity. No epistaxis. Right sinus exhibits frontal sinus tenderness. Right sinus exhibits no maxillary sinus tenderness. Left sinus exhibits frontal sinus tenderness. Left sinus exhibits no maxillary sinus tenderness.   Mouth/Throat: Uvula is midline and mucous membranes are normal. No trismus in the jaw. Normal dentition. No uvula swelling. Posterior oropharyngeal erythema present. No oropharyngeal exudate or posterior oropharyngeal edema.   Oropharynx with copious thick, clear mucus         Comments: Oropharynx with copious thick, clear mucus     Eyes: Conjunctivae and lids are normal. No scleral icterus.   Neck: Trachea normal, full passive range of motion without pain and phonation normal. Neck supple. No neck rigidity. No edema and no erythema present.   Cardiovascular: Normal rate, regular rhythm, normal heart sounds and normal pulses.   Pulmonary/Chest: Effort normal and breath sounds normal. No respiratory distress. She has no decreased breath sounds. She has no rhonchi.   Abdominal: Normal appearance.   Musculoskeletal: Normal range of motion.         General: No deformity.   Neurological: She is alert and oriented to person, place, and time. She exhibits normal muscle tone. Coordination normal.   Skin: Skin is warm, dry, intact, not diaphoretic, not pale and no rash. Psychiatric: Her speech is normal and behavior is normal. Judgment and thought content normal.   Nursing note and vitals reviewed.        Assessment:       1. Sinus headache    2. Exposure to Covid-19 Virus    3. Allergic rhinitis, unspecified  seasonality, unspecified trigger    4. Nasal sinus congestion    5. Sore throat        Plan:         Sinus headache  -     fluticasone propionate (FLONASE) 50 mcg/actuation nasal spray; 1 spray (50 mcg total) by Each Nostril route once daily.  Dispense: 15.8 mL; Refill: 1    Exposure to Covid-19 Virus    Allergic rhinitis, unspecified seasonality, unspecified trigger  -     fluticasone propionate (FLONASE) 50 mcg/actuation nasal spray; 1 spray (50 mcg total) by Each Nostril route once daily.  Dispense: 15.8 mL; Refill: 1    Nasal sinus congestion  -     fluticasone propionate (FLONASE) 50 mcg/actuation nasal spray; 1 spray (50 mcg total) by Each Nostril route once daily.  Dispense: 15.8 mL; Refill: 1    Sore throat  -     (Magic mouthwash) 1:1:1 Benadryl 12.5mg/5ml liq, aluminum & magnesium hydroxide-simehticone (Maalox), lidocaine viscous 2%; Swish and spit 10 mLs every 6 (six) hours as needed (sore throat).  Dispense: 100 mL; Refill: 0

## 2020-07-28 NOTE — PATIENT INSTRUCTIONS
Return to Urgent Care or go to ER if symptoms worsen or fail to improve.  Follow up with PCP as recommended for further management.   We will contact you in 3-5 days with your test results.    Please remain home quarantined at least until you are notified of your test results.   If your Covid-19 PCR test is positive, you will need to remain in isolation x 10 days from today and at least 3 days with no fever, without the use of fever reducing medications (ibuprofen or acetaminophen)--- whichever is longer.         Sinus Headache    The sinuses are air-filled spaces within the bones of the face. They connect to the inside of the nose. Sinusitis is an inflammation of the tissue lining the sinus cavity. Sinus inflammation can occur during a cold or hay fever (allergies to pollens and other particles in the air) and cause symptoms of sinus congestion and fullness and perhaps a low-grade fever. An infection is usually present when there is also facial pain or headache and green or yellow drainage from the nose or into the back of the throat (postnasal drip). Antibiotics are often prescribed to treat this condition.  Sinus headache may cause pain in different places, depending on which sinuses are infected. There may be pain in the temples, forehead, top of the head, behind or around the eye, across the cheekbone, or into the upper teeth.  You may find that changing your position, sitting upright or lying down, will bring some relief.  Home care  The following guidelines will help you care for yourself at home:  · Drink plenty of water, hot tea, and other liquids to stay well hydrated. This thins the mucus and helps your sinuses drain.  · Apply heat to the painful areas of the face. Use a towel soaked in hot water. Or  the shower with the hot spray on your face. This is a good way to inhale warm water vapor and get heat on your face at the same time. Cover your mouth and nose with your hands so you can still  breathe as you do this.  · Use a cool mist vaporizer at night. Suck on peppermint, menthol, or eucalyptus hard candies during the day.  · An expectorant containing guaifenesin helps thin the mucus. It also helps your sinuses drain.  · You may use over-the-counter decongestants unless a similar medicine was prescribed. Nasal sprays or drops work the fastest. Use one that contains phenylephrine or oxymetazoline. First blow your nose gently to remove mucus. Then apply the spray or drops. Don't use decongestant nasal sprays or drops more often than the label says or for more than 3 days. This can make symptoms worse. Nasal sprays or drops prescribed by your doctor typically do not have these limits. Check with your doctor or pharmacist. You may also use oral tablets containing pseudoephedrine. Side effects from oral decongestants tend to be worse than with nasal sprays or drops, and may keep you from using them. Many sinus remedies combine ingredients, which may increase side effects. Also, if you are taking a combination medicine with another medicine, be sure you are not taking a double dose of anything by mistake. Read the labels or ask the pharmacist for help. Talk with your doctor before using decongestants if you have high blood pressure, heart disease, glaucoma, or prostate trouble.  · Antihistamines may help if allergies are causing your sinusitis. You can get chlorpheniramine and diphenhydramine over the counter, but these can cause drowsiness. Don't use these if you have glaucoma or if you are a man with trouble urinating due to an enlarged prostate. Over-the-counter antihistamines containing loratidine and cetirizine cause less drowsiness and may be a better choice for daytime use.  · When allergies cause your sinusitis, a saline nasal rinse may give relief. A saline nasal rinse reduces swelling and clears excess mucus. This allows sinuses to drain. Prepackaged kits are available at most drugstores. These  contain premixed salt packets and an irrigation device. If antibiotics have been prescribed to treat an acute sinus infection, talk with your doctor before using a nasal rinse to be sure it is safe for you.  · You may use over-the-counter medicine to control pain and fever, unless another pain medicine was prescribed. Talk with your doctor before using acetaminophen or ibuprofen if you have chronic liver or kidney disease. Also talk with your doctor if you have ever had a stomach ulcer. Aspirin should never be used in anyone under 18 years of age who has a fever. It may cause a life-threatening condition called Reye syndrome.  · If antibiotics were given, finish all of them, even if you are feeling better after a few days.  Follow-up care  Follow up with your healthcare provider, or as advised if your symptoms aren't better in 1 week.  Call 911  Call 911 if any of these occur:  · Unusual drowsiness or confusion  · Swelling of the forehead or eyelids  · Vision problems including blurred or double vision  · Seizure  When to seek medical advice  Call your healthcare provider right away if any of these occur:  · Facial pain or headache becomes more severe  · Stiff neck  · Fever over 100.4º F (38.0º C) for more than 3 days on antibiotics  · Bleeding from the nose or throat  Date Last Reviewed: 10/1/2016  © 9529-0041 [employer]. [address] [place] [address], [place] [address]. All rights reserved. This information is not intended as a substitute for professional medical care. Always follow your healthcare professional's instructions.

## 2020-07-30 LAB — SARS-COV-2 RNA RESP QL NAA+PROBE: NOT DETECTED

## 2020-09-13 ENCOUNTER — HOSPITAL ENCOUNTER (EMERGENCY)
Facility: HOSPITAL | Age: 35
Discharge: HOME | End: 2020-09-13
Attending: EMERGENCY MEDICINE
Payer: COMMERCIAL

## 2020-09-13 VITALS
SYSTOLIC BLOOD PRESSURE: 129 MMHG | WEIGHT: 163 LBS | TEMPERATURE: 100 F | OXYGEN SATURATION: 97 % | HEIGHT: 67 IN | RESPIRATION RATE: 18 BRPM | BODY MASS INDEX: 25.58 KG/M2 | DIASTOLIC BLOOD PRESSURE: 85 MMHG | HEART RATE: 106 BPM

## 2020-09-13 DIAGNOSIS — R05.9 COUGH: ICD-10-CM

## 2020-09-13 DIAGNOSIS — B34.9 VIRAL SYNDROME: Primary | ICD-10-CM

## 2020-09-13 LAB
B-HCG UR QL: NEGATIVE
CTP QC/QA: YES
CTP QC/QA: YES
POCT GLUCOSE: 127 MG/DL (ref 70–110)
SARS-COV-2 RDRP RESP QL NAA+PROBE: NEGATIVE

## 2020-09-13 PROCEDURE — 81025 URINE PREGNANCY TEST: CPT | Performed by: PHYSICIAN ASSISTANT

## 2020-09-13 PROCEDURE — 82962 GLUCOSE BLOOD TEST: CPT

## 2020-09-13 PROCEDURE — 99284 EMERGENCY DEPT VISIT MOD MDM: CPT | Mod: 25

## 2020-09-13 PROCEDURE — 25000003 PHARM REV CODE 250: Performed by: PHYSICIAN ASSISTANT

## 2020-09-13 PROCEDURE — U0002 COVID-19 LAB TEST NON-CDC: HCPCS | Performed by: PHYSICIAN ASSISTANT

## 2020-09-13 RX ORDER — HYDROCORTISONE 1 %
CREAM (GRAM) TOPICAL
Qty: 30 G | Refills: 0 | Status: SHIPPED | OUTPATIENT
Start: 2020-09-13 | End: 2020-10-21

## 2020-09-13 RX ORDER — ACETAMINOPHEN 500 MG
1000 TABLET ORAL
Status: COMPLETED | OUTPATIENT
Start: 2020-09-13 | End: 2020-09-13

## 2020-09-13 RX ORDER — HYDROCORTISONE 1 %
CREAM (GRAM) TOPICAL
Qty: 30 G | Refills: 0 | COMMUNITY
Start: 2020-09-13 | End: 2020-09-13 | Stop reason: SDUPTHER

## 2020-09-13 RX ORDER — BENZONATATE 100 MG/1
100 CAPSULE ORAL
Status: COMPLETED | OUTPATIENT
Start: 2020-09-13 | End: 2020-09-13

## 2020-09-13 RX ORDER — BENZONATATE 100 MG/1
100 CAPSULE ORAL 3 TIMES DAILY PRN
Qty: 20 CAPSULE | Refills: 0 | Status: SHIPPED | OUTPATIENT
Start: 2020-09-13 | End: 2020-09-23

## 2020-09-13 RX ADMIN — ACETAMINOPHEN 1000 MG: 500 TABLET ORAL at 02:09

## 2020-09-13 RX ADMIN — BENZONATATE 100 MG: 100 CAPSULE ORAL at 02:09

## 2020-09-13 NOTE — Clinical Note
"Maribel Lintonisabella Valderrama was seen and treated in our emergency department on 9/13/2020.  She may return to work on 09/16/2020.       If you have any questions or concerns, please don't hesitate to call.      Dariusz Kenney PA-C"

## 2020-09-13 NOTE — ED PROVIDER NOTES
Encounter Date: 9/13/2020       History     Chief Complaint   Patient presents with    Fatigue     Pt c/o weakness, cough, fever that started yesterday. Temp 100.6 at home. Denies abd pain, N/V/D, CP, SOB, congestion. Denies taking meds for symptoms PTA. Temp 100.9 in triage. Pain is 3-4/10 (lower back). Denies taking meds for symptoms PTA    Fever     35-year-old generally healthy female complains of headache, cough, generalized weakness and fever.  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  Initial symptoms were malaise, which progressed today to include headache, cough, and temperature 100.6° at home.  She denies difficulty breathing, chest pain, or vomiting.  Reports mild associated nausea.  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        Review of patient's allergies indicates:   Allergen Reactions    Sulfamethoxazole-trimethoprim Other (See Comments)     Past Medical History:   Diagnosis Date    Chronic constipation      Past Surgical History:   Procedure Laterality Date    COLONOSCOPY N/A 10/23/2018    Procedure: COLONOSCOPY;  Surgeon: Natalia Rocha MD;  Location: [address]);  Service: Endoscopy;  Laterality: N/A;  constipation prep    Da Kory-assisted resection of complex left ovarian cyst x2 Left 06/22/2016    DONNA done at time of surgery -Dermoid and Hemorrhagic ovarian cyst    Ovarian Teratoma Removal Right 4/5/2010    15 weeks gestation, DERMOID RIGHT      Family History   Problem Relation Age of Onset    Hypertension Mother     Hypertension Father     Breast cancer Other     Cancer Other         cervical cancer    Diabetes Maternal Uncle     Ovarian cancer Neg Hx     Stroke Neg Hx     Colon cancer Neg Hx     Celiac disease Neg Hx     Cirrhosis Neg Hx     Colon polyps Neg Hx     Crohn's disease Neg Hx     Esophageal cancer Neg Hx     Inflammatory bowel disease Neg Hx     Liver cancer Neg Hx     Liver disease Neg  Hx     Rectal cancer Neg Hx     Stomach cancer Neg Hx     Ulcerative colitis Neg Hx      Social History     Tobacco Use    Smoking status: Never Smoker    Smokeless tobacco: Never Used   Substance Use Topics    Alcohol use: No    Drug use: No     Review of Systems   Constitutional: Positive for fatigue and fever.   HENT: Negative for sore throat.    Respiratory: Positive for cough. Negative for shortness of breath.    Cardiovascular: Negative for chest pain.   Gastrointestinal: Positive for nausea.   Genitourinary: Negative for dysuria.   Musculoskeletal: Negative for back pain, neck pain and neck stiffness.   Skin: Negative for rash.   Neurological: Positive for headaches. Negative for weakness.   Hematological: Does not bruise/bleed easily.       Physical Exam     Initial Vitals [09/13/20 1428]   BP Pulse Resp Temp SpO2   (!) 149/83 (!) 111 20 (!) 100.9 °F (38.3 °C) 100 %      MAP       --         Physical Exam    Vitals reviewed.  Constitutional: She appears well-developed and well-nourished. She is not diaphoretic. No distress.   HENT:   Head: Normocephalic and atraumatic.   Right Ear: External ear normal.   Left Ear: External ear normal.   Nose: Nose normal.   Eyes: Conjunctivae are normal. No scleral icterus.   Neck: Normal range of motion. Neck supple. No JVD present.   Cardiovascular: Normal rate, regular rhythm, normal heart sounds and intact distal pulses.   Pulmonary/Chest: Breath sounds normal. No respiratory distress. She has no wheezes. She has no rhonchi. She has no rales. She exhibits no tenderness.   Musculoskeletal: Normal range of motion.   Lymphadenopathy:     She has no cervical adenopathy.   Neurological: She is alert and oriented to person, place, and time.   Skin: Skin is warm and dry.         ED Course   Procedures  Labs Reviewed   POCT GLUCOSE - Abnormal; Notable for the following components:       Result Value    POCT Glucose 127 (*)     All other components within normal limits    SARS-COV-2 RDRP GENE   POCT URINE PREGNANCY          Imaging Results          X-Ray Chest PA And Lateral (Final result)  Result time 09/13/20 14:53:53    Final result by Horacio Salcedo MD (09/13/20 14:53:53)                 Impression:      No radiographic evidence of pneumonia or other source of cough, noting that early/mild viral pneumonia may be radiographically occult.      Electronically signed by: Horacio Salcedo MD  Date:    09/13/2020  Time:    14:53             Narrative:    EXAMINATION:  XR CHEST PA AND LATERAL    CLINICAL HISTORY:  Cough    TECHNIQUE:  PA and lateral views of the chest were performed.    COMPARISON:  CT abdomen and pelvis 05/20/2016    FINDINGS:  Bibasilar minimal platelike scarring versus atelectasis.The lungs are otherwise symmetrically well expanded and clear.  No pleural effusion or pneumothorax.  Pulmonary vasculature and hilar contours are within normal limits.    The cardiac silhouette is normal in size. Mediastinal contours are within normal limits.    Bones are intact.                              X-Rays:   Independently Interpreted Readings:   Chest X-Ray: Normal heart size.  No infiltrates.  No acute abnormalities.     Medical Decision Making:   ED Management:  35-year-old female with fever and cough with malaise today, concerning for viral process.  Rapid COVID negative.  Chest x-ray without evidence for pneumonia.  Patient is well-appearing in no distress.  Treated with antipyretics with improvement in temperature and symptoms.  Will discharge home with supportive care.                             Clinical Impression:       ICD-10-CM ICD-9-CM   1. Viral syndrome  B34.9 079.99   2. Cough  R05 786.2             ED Disposition Condition    Discharge Stable        ED Prescriptions     Medication Sig Dispense Start Date End Date Auth. Provider    benzonatate (TESSALON) 100 MG capsule Take 1 capsule (100 mg total) by mouth 3 (three) times daily as needed for Cough. 20 capsule  9/13/2020 9/23/2020 Dariusz Kenney PA-C    hydrocortisone 1 % cream  (Status: Discontinued) Apply to affected area 2 times daily 30 g 9/13/2020 9/13/2020 Dariusz Kenney PA-C    hydrocortisone 1 % cream Apply to affected area 2 times daily 30 g 9/13/2020  Dariusz Kenney PA-C        Follow-up Information     Follow up With Specialties Details Why Contact Info      In 1 week      Primary care provider  Schedule an appointment as soon as possible for a visit       [facility] Emergency [facility] Go to  If symptoms worsen [address] [facility] [id number] [phone]                                       Dariusz Kenney PA-C  09/13/20 4609

## 2020-09-13 NOTE — ED TRIAGE NOTES
Pt c/o weakness, cough, fever that started yesterday. Temp 100.6 at home. Denies abd pain, N/V/D, CP, SOB, congestion. Denies taking meds for symptoms PTA. Temp 100.9 in triage. Pain is 3-4/10 (lower back). Denies taking meds for symptoms PTA

## 2020-09-17 ENCOUNTER — HOSPITAL ENCOUNTER (EMERGENCY)
Facility: HOSPITAL | Age: 35
Discharge: HOME | End: 2020-09-17
Attending: EMERGENCY MEDICINE
Payer: COMMERCIAL

## 2020-09-17 VITALS
WEIGHT: 163 LBS | TEMPERATURE: 99 F | BODY MASS INDEX: 25.58 KG/M2 | OXYGEN SATURATION: 99 % | RESPIRATION RATE: 18 BRPM | SYSTOLIC BLOOD PRESSURE: 121 MMHG | DIASTOLIC BLOOD PRESSURE: 85 MMHG | HEIGHT: 67 IN | HEART RATE: 96 BPM

## 2020-09-17 DIAGNOSIS — Z20.822 SUSPECTED COVID-19 VIRUS INFECTION: Primary | ICD-10-CM

## 2020-09-17 DIAGNOSIS — R74.8 ELEVATED LIVER ENZYMES: ICD-10-CM

## 2020-09-17 DIAGNOSIS — R50.9 FEVER: ICD-10-CM

## 2020-09-17 DIAGNOSIS — B34.9 VIRAL SYNDROME: ICD-10-CM

## 2020-09-17 LAB
ALBUMIN SERPL BCP-MCNC: 3.5 G/DL (ref 3.5–5.2)
ALP SERPL-CCNC: 82 U/L (ref 55–135)
ALT SERPL W/O P-5'-P-CCNC: 91 U/L (ref 10–44)
ANION GAP SERPL CALC-SCNC: 11 MMOL/L (ref 8–16)
AST SERPL-CCNC: 50 U/L (ref 10–40)
B-HCG UR QL: NEGATIVE
BACTERIA #/AREA URNS HPF: ABNORMAL /HPF
BASOPHILS # BLD AUTO: 0.02 K/UL (ref 0–0.2)
BASOPHILS NFR BLD: 0.5 % (ref 0–1.9)
BILIRUB SERPL-MCNC: 0.2 MG/DL (ref 0.1–1)
BILIRUB UR QL STRIP: NEGATIVE
BUN SERPL-MCNC: 10 MG/DL (ref 6–20)
CALCIUM SERPL-MCNC: 8.3 MG/DL (ref 8.7–10.5)
CHLORIDE SERPL-SCNC: 107 MMOL/L (ref 95–110)
CLARITY UR: CLEAR
CO2 SERPL-SCNC: 20 MMOL/L (ref 23–29)
COLOR UR: YELLOW
CREAT SERPL-MCNC: 0.8 MG/DL (ref 0.5–1.4)
CTP QC/QA: YES
DIFFERENTIAL METHOD: ABNORMAL
EOSINOPHIL # BLD AUTO: 0 K/UL (ref 0–0.5)
EOSINOPHIL NFR BLD: 0 % (ref 0–8)
ERYTHROCYTE [DISTWIDTH] IN BLOOD BY AUTOMATED COUNT: 12.8 % (ref 11.5–14.5)
EST. GFR  (AFRICAN AMERICAN): >60 ML/MIN/1.73 M^2
EST. GFR  (NON AFRICAN AMERICAN): >60 ML/MIN/1.73 M^2
GLUCOSE SERPL-MCNC: 112 MG/DL (ref 70–110)
GLUCOSE UR QL STRIP: NEGATIVE
HCT VFR BLD AUTO: 34 % (ref 37–48.5)
HGB BLD-MCNC: 11 G/DL (ref 12–16)
HGB UR QL STRIP: ABNORMAL
IMM GRANULOCYTES # BLD AUTO: 0.01 K/UL (ref 0–0.04)
IMM GRANULOCYTES NFR BLD AUTO: 0.3 % (ref 0–0.5)
KETONES UR QL STRIP: NEGATIVE
LACTATE SERPL-SCNC: 1.5 MMOL/L (ref 0.5–2.2)
LEUKOCYTE ESTERASE UR QL STRIP: ABNORMAL
LYMPHOCYTES # BLD AUTO: 0.9 K/UL (ref 1–4.8)
LYMPHOCYTES NFR BLD: 24.1 % (ref 18–48)
MCH RBC QN AUTO: 29.2 PG (ref 27–31)
MCHC RBC AUTO-ENTMCNC: 32.4 G/DL (ref 32–36)
MCV RBC AUTO: 90 FL (ref 82–98)
MICROSCOPIC COMMENT: ABNORMAL
MONOCYTES # BLD AUTO: 0.3 K/UL (ref 0.3–1)
MONOCYTES NFR BLD: 9.1 % (ref 4–15)
NEUTROPHILS # BLD AUTO: 2.5 K/UL (ref 1.8–7.7)
NEUTROPHILS NFR BLD: 66 % (ref 38–73)
NITRITE UR QL STRIP: NEGATIVE
NRBC BLD-RTO: 0 /100 WBC
PH UR STRIP: 5 [PH] (ref 5–8)
PLATELET # BLD AUTO: 338 K/UL (ref 150–350)
PMV BLD AUTO: 8.7 FL (ref 9.2–12.9)
POC MOLECULAR INFLUENZA A AGN: NEGATIVE
POC MOLECULAR INFLUENZA B AGN: NEGATIVE
POTASSIUM SERPL-SCNC: 3.5 MMOL/L (ref 3.5–5.1)
PROCALCITONIN SERPL IA-MCNC: 0.04 NG/ML
PROT SERPL-MCNC: 7.4 G/DL (ref 6–8.4)
PROT UR QL STRIP: NEGATIVE
RBC # BLD AUTO: 3.77 M/UL (ref 4–5.4)
RBC #/AREA URNS HPF: 1 /HPF (ref 0–4)
SARS-COV-2 RDRP RESP QL NAA+PROBE: NEGATIVE
SODIUM SERPL-SCNC: 138 MMOL/L (ref 136–145)
SP GR UR STRIP: 1.01 (ref 1–1.03)
SQUAMOUS #/AREA URNS HPF: 3 /HPF
URN SPEC COLLECT METH UR: ABNORMAL
UROBILINOGEN UR STRIP-ACNC: NEGATIVE EU/DL
WBC # BLD AUTO: 3.74 K/UL (ref 3.9–12.7)
WBC #/AREA URNS HPF: 2 /HPF (ref 0–5)

## 2020-09-17 PROCEDURE — 87040 BLOOD CULTURE FOR BACTERIA: CPT | Mod: 59

## 2020-09-17 PROCEDURE — 96360 HYDRATION IV INFUSION INIT: CPT

## 2020-09-17 PROCEDURE — U0003 INFECTIOUS AGENT DETECTION BY NUCLEIC ACID (DNA OR RNA); SEVERE ACUTE RESPIRATORY SYNDROME CORONAVIRUS 2 (SARS-COV-2) (CORONAVIRUS DISEASE [COVID-19]), AMPLIFIED PROBE TECHNIQUE, MAKING USE OF HIGH THROUGHPUT TECHNOLOGIES AS DESCRIBED BY CMS-2020-01-R: HCPCS

## 2020-09-17 PROCEDURE — 25000003 PHARM REV CODE 250: Performed by: NURSE PRACTITIONER

## 2020-09-17 PROCEDURE — 83605 ASSAY OF LACTIC ACID: CPT

## 2020-09-17 PROCEDURE — U0002 COVID-19 LAB TEST NON-CDC: HCPCS | Performed by: NURSE PRACTITIONER

## 2020-09-17 PROCEDURE — 96361 HYDRATE IV INFUSION ADD-ON: CPT

## 2020-09-17 PROCEDURE — 87502 INFLUENZA DNA AMP PROBE: CPT

## 2020-09-17 PROCEDURE — 80053 COMPREHEN METABOLIC PANEL: CPT

## 2020-09-17 PROCEDURE — 84145 PROCALCITONIN (PCT): CPT

## 2020-09-17 PROCEDURE — 99284 EMERGENCY DEPT VISIT MOD MDM: CPT | Mod: 25

## 2020-09-17 PROCEDURE — 81025 URINE PREGNANCY TEST: CPT | Performed by: NURSE PRACTITIONER

## 2020-09-17 PROCEDURE — 85025 COMPLETE CBC W/AUTO DIFF WBC: CPT

## 2020-09-17 PROCEDURE — 81000 URINALYSIS NONAUTO W/SCOPE: CPT

## 2020-09-17 RX ORDER — IBUPROFEN 400 MG/1
800 TABLET ORAL
Status: COMPLETED | OUTPATIENT
Start: 2020-09-17 | End: 2020-09-17

## 2020-09-17 RX ADMIN — SODIUM CHLORIDE 1000 ML: 0.9 INJECTION, SOLUTION INTRAVENOUS at 12:09

## 2020-09-17 RX ADMIN — IBUPROFEN 800 MG: 400 TABLET, FILM COATED ORAL at 12:09

## 2020-09-17 NOTE — ED PROVIDER NOTES
Encounter Date: 9/17/2020       History     Chief Complaint   Patient presents with    Headache     Pt c/o headache, fatigue, chills, temp 101 at home. Pt reports she was tested for covid on 9/13/2020 but it came back negative. Pain is 5/10    Fever     The patient is a 35 year old  female with no significant past medical history who presents to the ED with a 5 day history of fever, headache, and fatigue. Patient was seen in the ED for the same complaints on 09/17/20 and diagnosed with a viral syndrome. She returned today because her symptoms are not improving. 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  She has been taking acetaminophen for her fever and states her max temp was 101.3F. Her last antipyretic dose was at 11:15 am today. Patient also reports an occasional dry cough and rhinorrhea which began with her other symptoms. Patient denies any neck pain, photophobia, sore throat, nausea, vomiting, abdominal pain, chest pain, SOB, and all other symptoms at this time.         Review of patient's allergies indicates:   Allergen Reactions    Sulfamethoxazole-trimethoprim Other (See Comments)     Past Medical History:   Diagnosis Date    Chronic constipation      Past Surgical History:   Procedure Laterality Date    COLONOSCOPY N/A 10/23/2018    Procedure: COLONOSCOPY;  Surgeon: Nataila Rocha MD;  Location: [address]);  Service: Endoscopy;  Laterality: N/A;  constipation prep    Da Kory-assisted resection of complex left ovarian cyst x2 Left 06/22/2016    DONNA done at time of surgery -Dermoid and Hemorrhagic ovarian cyst    Ovarian Teratoma Removal Right 4/5/2010    15 weeks gestation, DERMOID RIGHT      Family History   Problem Relation Age of Onset    Hypertension Mother     Hypertension Father     Breast cancer Other     Cancer Other         cervical cancer    Diabetes Maternal Uncle     Ovarian cancer Neg Hx      Stroke Neg Hx     Colon cancer Neg Hx     Celiac disease Neg Hx     Cirrhosis Neg Hx     Colon polyps Neg Hx     Crohn's disease Neg Hx     Esophageal cancer Neg Hx     Inflammatory bowel disease Neg Hx     Liver cancer Neg Hx     Liver disease Neg Hx     Rectal cancer Neg Hx     Stomach cancer Neg Hx     Ulcerative colitis Neg Hx      Social History     Tobacco Use    Smoking status: Never Smoker    Smokeless tobacco: Never Used   Substance Use Topics    Alcohol use: No    Drug use: No     Review of Systems   Constitutional: Positive for fatigue and fever. Negative for chills.   HENT: Positive for rhinorrhea. Negative for congestion, ear pain, nosebleeds, postnasal drip, sinus pressure and sore throat.    Eyes: Negative for photophobia and pain.   Respiratory: Positive for cough. Negative for apnea, choking, chest tightness, shortness of breath, wheezing and stridor.    Cardiovascular: Negative for chest pain, palpitations and leg swelling.   Gastrointestinal: Negative for abdominal pain, constipation, diarrhea, nausea and vomiting.   Genitourinary: Negative for dysuria, flank pain, hematuria, pelvic pain and vaginal discharge.   Musculoskeletal: Negative for arthralgias, back pain, gait problem and neck pain.   Skin: Negative for color change, pallor, rash and wound.   Neurological: Positive for headaches. Negative for dizziness, seizures, syncope, facial asymmetry and light-headedness.   Hematological: Negative for adenopathy.   Psychiatric/Behavioral: Negative for confusion. The patient is not nervous/anxious.        Physical Exam     Initial Vitals [09/17/20 1134]   BP Pulse Resp Temp SpO2   134/85 (!) 117 18 (!) 101 °F (38.3 °C) 100 %      MAP       --         Physical Exam    Constitutional: She appears well-developed and well-nourished. She is not diaphoretic. No distress. She is not intubated.   HENT:   Head: Normocephalic and atraumatic.   Right Ear: External ear normal.   Left Ear:  External ear normal.   Mouth/Throat: Uvula is midline, oropharynx is clear and moist and mucous membranes are normal. No oral lesions. No trismus in the jaw. Normal dentition. No dental abscesses, uvula swelling or lacerations. No oropharyngeal exudate, posterior oropharyngeal edema, posterior oropharyngeal erythema or tonsillar abscesses.   Eyes: Conjunctivae and EOM are normal. Pupils are equal, round, and reactive to light. Right eye exhibits no discharge. Left eye exhibits no discharge.   Neck: Normal range of motion and full passive range of motion without pain. Neck supple. No spinous process tenderness and no muscular tenderness present. No edema, no erythema and normal range of motion present. No neck rigidity. No Brudzinski's sign noted.   Cardiovascular: Normal heart sounds and intact distal pulses. Tachycardia present.    Pulmonary/Chest: Effort normal and breath sounds normal. No accessory muscle usage. No apnea, no tachypnea and no bradypnea. She is not intubated. No respiratory distress.   Abdominal: Soft. Normal appearance. She exhibits no distension and no mass. There is no abdominal tenderness. There is no rebound and no guarding.   Lymphadenopathy:     She has no cervical adenopathy.   Neurological: She is alert and oriented to person, place, and time. She has normal strength. No cranial nerve deficit. GCS score is 15. GCS eye subscore is 4. GCS verbal subscore is 5. GCS motor subscore is 6.   Skin: Skin is warm and dry. Capillary refill takes less than 2 seconds. No rash noted. No erythema. No pallor.   Psychiatric: She has a normal mood and affect. Her behavior is normal. Judgment and thought content normal.         ED Course   Procedures  Labs Reviewed   URINALYSIS, REFLEX TO URINE CULTURE - Abnormal; Notable for the following components:       Result Value    Occult Blood UA 2+ (*)     Leukocytes, UA Trace (*)     All other components within normal limits    Narrative:     Specimen  Source->Urine   CBC W/ AUTO DIFFERENTIAL - Abnormal; Notable for the following components:    WBC 3.74 (*)     RBC 3.77 (*)     Hemoglobin 11.0 (*)     Hematocrit 34.0 (*)     MPV 8.7 (*)     Lymph # 0.9 (*)     All other components within normal limits    Narrative:     Recoll. [id number] by TN3 at 09/17/2020 13:10, reason: Specimen   hemolyzedInsufficient specimen 09/17/2020  13:10   COMPREHENSIVE METABOLIC PANEL - Abnormal; Notable for the following components:    CO2 20 (*)     Glucose 112 (*)     Calcium 8.3 (*)     AST 50 (*)     ALT 91 (*)     All other components within normal limits    Narrative:     Recoll. [id number] by TN3 at 09/17/2020 13:10, reason: Specimen   hemolyzedInsufficient specimen 09/17/2020  13:10   URINALYSIS MICROSCOPIC - Abnormal; Notable for the following components:    Bacteria Few (*)     All other components within normal limits    Narrative:     Specimen Source->Urine   CULTURE, BLOOD   CULTURE, BLOOD   LACTIC ACID, PLASMA   PROCALCITONIN   SARS-COV-2 (COVID-19) QUALITATIVE PCR   SARS-COV-2 RDRP GENE   POCT INFLUENZA A/B MOLECULAR   POCT URINE PREGNANCY          Imaging Results          X-Ray Chest AP Portable (Final result)  Result time 09/17/20 12:43:25    Final result by Maria Teresa Castorena MD (09/17/20 12:43:25)                 Impression:      No definite acute intrathoracic process seen.      Electronically signed by: Maria Teresa Castorena MD  Date:    09/17/2020  Time:    12:43             Narrative:    EXAMINATION:  XR CHEST AP PORTABLE    CLINICAL HISTORY:  Fever, unspecified    TECHNIQUE:  Single frontal view of the chest was performed.    COMPARISON:  09/13/2020    FINDINGS:  The cardiac silhouette is normal in size, midline.  The pulmonary vascularity is normal.  The lungs are clear and well expanded.  No focal airspace disease.  No pleural effusion.  No pneumothorax.                                 Medical Decision Making:   History:   Old Medical Records: I decided to  obtain old medical records.  Differential Diagnosis:   Viral syndrome, COVID-19, influenza, sepsis, meningitis, pneumonia, PE, acute bronchitis, encephalitis  Clinical Tests:   Lab Tests: Ordered and Reviewed  Radiological Study: Ordered and Reviewed  ED Management:  DDx:  Influenza, viral syndrome, COVID-19, strep pharyngitis, viral pharyngitis, otitis media, sinusitis, pneumonia, bronchitis, meningitis, sepsis, others    HPI and physical exam as above.      The patient appears to have a viral infection.  Given the widespread activity of the COVID-19 pandemic in our area at this time there is a possibility that it is the etiology of the patient's symptoms.  The patient works in health care at a different hospital.  Based upon the history and physical exam the patient does not appear to have a serious bacterial infection such as sepsis, otitis media, bacterial sinusitis, strep pharyngitis, parapharyngeal or peritonsillar abscess, meningitis. Chest x-ray shows no evidence of pneumonia or other abnormality.  Respiratory effort is normal. Lungs are clear to auscultation bilaterally in all fields. Mucous membranes are moist and the patient is tolerating P.O. without difficulty.  The patient's RNA amplification test for SARS-COV-2 was negative, however there is a possibility of this being a false negative.  COVID-19 routine PCR test is in process.  CBC with leukopenia.  Mildly elevated liver enzymes.  Procalcitonin and lactic acid are negative.  Blood cultures are in process.  Patient is afebrile following treatment with antipyretics.  Patient is nontoxic, alert, active, and appears very well at this time just prior to discharge.  Room air oxygen saturation 99%.  I have given specific return precautions to the patient regarding dyspnea.  I will prescribe medications to treat the patient's symptoms.     The results and physical exam findings were reviewed with the patient.  I advised the patient to remain home and  self-isolate from others. The patient should wear a surgical mask at all times, especially if she must be around others.  Strict ED return precautions given concerning worsening shortness of breath/dyspnea. All questions regarding diagnosis and plan were answered to the patient's fullest possible satisfaction. Patient expressed understanding of diagnosis, discharge instructions, and return precautions.                              Clinical Impression:       ICD-10-CM ICD-9-CM   1. Suspected Covid-19 Virus Infection  R68.89    2. Fever  R50.9 780.60   3. Viral syndrome  B34.9 079.99   4. Elevated liver enzymes  R74.8 790.5                      Disposition:   Disposition: Discharged  Condition: Stable     ED Disposition Condition    Discharge Stable        ED Prescriptions     None        Follow-up Information     Follow up With Specialties Details Why Contact Info    Fatemeh Little MD Family Medicine, Wound Care Schedule an appointment as soon as possible for a visit in 1 week For further evaluation [address] [facility] [id number]  [phone]      [facility] Go to  If symptoms worsen, As needed [address] [facility] [address] [phone]                                       Vj Doe NP  09/17/20 1211

## 2020-09-17 NOTE — DISCHARGE INSTRUCTIONS
Isolate yourself at home away from others.  Sleep in a separate bedroom and use a separate bathroom from anyone else in the house if possible.  Wear a mask at all times, especially if you must be around others.  Cough into your elbow instead of your hand at all times.  Wash your hands for 20 seconds very frequently.    Take all medications as prescribed.  Take Tylenol for fevers as needed.    Return to the emergency department for worsening shortness of breath/difficulty breathing. Call your regular doctor for follow-up instructions or utilize telehealth services.    Thank you for coming to our Emergency Department today. It is important to remember that some problems are difficult to diagnose and may not be found during your first visit. Be sure to follow up with your primary care doctor. Make sure to tell him/her that you may have COVID-19 when you call.  If you do not have one, you may contact the one listed on your discharge paperwork or you may also call the [facility] Appointment Desk at [phone] to schedule an appointment with one.     Return to the ER with any questions/concerns, new/concerning symptoms, worsening or failure to improve. Do not drive or make any important decisions for 24 hours if you have received any pain medications, sedatives or mood altering drugs during your ER visit.

## 2020-09-17 NOTE — ED NOTES
Pt presents to ED with c/o headache, fever and chills. 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 Denies n/v, chest pain and SOB but reports occasional dizziness and weakness. Reports being tested for covid on 9/13 with neg results. NAD noted.

## 2020-09-18 LAB — SARS-COV-2 RNA RESP QL NAA+PROBE: NOT DETECTED

## 2020-09-22 LAB
BACTERIA BLD CULT: NORMAL
BACTERIA BLD CULT: NORMAL

## 2020-10-21 ENCOUNTER — OFFICE VISIT (OUTPATIENT)
Dept: FAMILY MEDICINE | Facility: CLINIC | Age: 35
End: 2020-10-21
Payer: COMMERCIAL

## 2020-10-21 ENCOUNTER — LAB VISIT (OUTPATIENT)
Dept: LAB | Facility: HOSPITAL | Age: 35
End: 2020-10-21
Attending: INTERNAL MEDICINE
Payer: COMMERCIAL

## 2020-10-21 VITALS
WEIGHT: 168.63 LBS | HEART RATE: 83 BPM | HEIGHT: 67 IN | BODY MASS INDEX: 26.47 KG/M2 | OXYGEN SATURATION: 99 % | DIASTOLIC BLOOD PRESSURE: 82 MMHG | SYSTOLIC BLOOD PRESSURE: 120 MMHG | TEMPERATURE: 98 F

## 2020-10-21 DIAGNOSIS — Z80.3 FAMILY HISTORY OF BREAST CANCER IN MOTHER: ICD-10-CM

## 2020-10-21 DIAGNOSIS — Z11.59 NEED FOR HEPATITIS C SCREENING TEST: ICD-10-CM

## 2020-10-21 DIAGNOSIS — Z11.59 NEED FOR HEPATITIS B SCREENING TEST: ICD-10-CM

## 2020-10-21 DIAGNOSIS — Z13.0 SCREENING FOR DEFICIENCY ANEMIA: ICD-10-CM

## 2020-10-21 DIAGNOSIS — R79.89 ABNORMAL LFTS: Primary | ICD-10-CM

## 2020-10-21 DIAGNOSIS — J30.9 CHRONIC ALLERGIC RHINITIS: ICD-10-CM

## 2020-10-21 DIAGNOSIS — D18.01 CHERRY ANGIOMA: ICD-10-CM

## 2020-10-21 LAB
ALBUMIN SERPL BCP-MCNC: 3.7 G/DL (ref 3.5–5.2)
ALP SERPL-CCNC: 83 U/L (ref 55–135)
ALT SERPL W/O P-5'-P-CCNC: 35 U/L (ref 10–44)
ANION GAP SERPL CALC-SCNC: 6 MMOL/L (ref 8–16)
AST SERPL-CCNC: 24 U/L (ref 10–40)
BASOPHILS # BLD AUTO: 0.02 K/UL (ref 0–0.2)
BASOPHILS NFR BLD: 0.4 % (ref 0–1.9)
BILIRUB SERPL-MCNC: 0.3 MG/DL (ref 0.1–1)
BUN SERPL-MCNC: 14 MG/DL (ref 6–20)
CALCIUM SERPL-MCNC: 9.7 MG/DL (ref 8.7–10.5)
CHLORIDE SERPL-SCNC: 103 MMOL/L (ref 95–110)
CHOLEST SERPL-MCNC: 171 MG/DL (ref 120–199)
CHOLEST/HDLC SERPL: 3.4 {RATIO} (ref 2–5)
CO2 SERPL-SCNC: 30 MMOL/L (ref 23–29)
CREAT SERPL-MCNC: 0.8 MG/DL (ref 0.5–1.4)
DIFFERENTIAL METHOD: ABNORMAL
EOSINOPHIL # BLD AUTO: 0.1 K/UL (ref 0–0.5)
EOSINOPHIL NFR BLD: 2.4 % (ref 0–8)
ERYTHROCYTE [DISTWIDTH] IN BLOOD BY AUTOMATED COUNT: 12.8 % (ref 11.5–14.5)
EST. GFR  (AFRICAN AMERICAN): >60 ML/MIN/1.73 M^2
EST. GFR  (NON AFRICAN AMERICAN): >60 ML/MIN/1.73 M^2
GLUCOSE SERPL-MCNC: 78 MG/DL (ref 70–110)
HCT VFR BLD AUTO: 37.5 % (ref 37–48.5)
HDLC SERPL-MCNC: 50 MG/DL (ref 40–75)
HDLC SERPL: 29.2 % (ref 20–50)
HGB BLD-MCNC: 11.3 G/DL (ref 12–16)
IMM GRANULOCYTES # BLD AUTO: 0.01 K/UL (ref 0–0.04)
IMM GRANULOCYTES NFR BLD AUTO: 0.2 % (ref 0–0.5)
LDLC SERPL CALC-MCNC: 111.6 MG/DL (ref 63–159)
LYMPHOCYTES # BLD AUTO: 2 K/UL (ref 1–4.8)
LYMPHOCYTES NFR BLD: 44.1 % (ref 18–48)
MCH RBC QN AUTO: 28 PG (ref 27–31)
MCHC RBC AUTO-ENTMCNC: 30.1 G/DL (ref 32–36)
MCV RBC AUTO: 93 FL (ref 82–98)
MONOCYTES # BLD AUTO: 0.4 K/UL (ref 0.3–1)
MONOCYTES NFR BLD: 9.4 % (ref 4–15)
NEUTROPHILS # BLD AUTO: 2 K/UL (ref 1.8–7.7)
NEUTROPHILS NFR BLD: 43.5 % (ref 38–73)
NONHDLC SERPL-MCNC: 121 MG/DL
NRBC BLD-RTO: 0 /100 WBC
PLATELET # BLD AUTO: 417 K/UL (ref 150–350)
PMV BLD AUTO: 9.2 FL (ref 9.2–12.9)
POTASSIUM SERPL-SCNC: 4.4 MMOL/L (ref 3.5–5.1)
PROT SERPL-MCNC: 7.3 G/DL (ref 6–8.4)
RBC # BLD AUTO: 4.03 M/UL (ref 4–5.4)
SODIUM SERPL-SCNC: 139 MMOL/L (ref 136–145)
TRIGL SERPL-MCNC: 47 MG/DL (ref 30–150)
WBC # BLD AUTO: 4.49 K/UL (ref 3.9–12.7)

## 2020-10-21 PROCEDURE — 80053 COMPREHEN METABOLIC PANEL: CPT

## 2020-10-21 PROCEDURE — 85025 COMPLETE CBC W/AUTO DIFF WBC: CPT

## 2020-10-21 PROCEDURE — 86803 HEPATITIS C AB TEST: CPT

## 2020-10-21 PROCEDURE — 87340 HEPATITIS B SURFACE AG IA: CPT

## 2020-10-21 PROCEDURE — 3074F SYST BP LT 130 MM HG: CPT | Mod: CPTII,S$GLB,, | Performed by: INTERNAL MEDICINE

## 2020-10-21 PROCEDURE — 99999 PR PBB SHADOW E&M-EST. PATIENT-LVL III: ICD-10-PCS | Mod: PBBFAC,,, | Performed by: INTERNAL MEDICINE

## 2020-10-21 PROCEDURE — 99214 OFFICE O/P EST MOD 30 MIN: CPT | Mod: S$GLB,,, | Performed by: INTERNAL MEDICINE

## 2020-10-21 PROCEDURE — 3008F BODY MASS INDEX DOCD: CPT | Mod: CPTII,S$GLB,, | Performed by: INTERNAL MEDICINE

## 2020-10-21 PROCEDURE — 80061 LIPID PANEL: CPT

## 2020-10-21 PROCEDURE — 3079F PR MOST RECENT DIASTOLIC BLOOD PRESSURE 80-89 MM HG: ICD-10-PCS | Mod: CPTII,S$GLB,, | Performed by: INTERNAL MEDICINE

## 2020-10-21 PROCEDURE — 3079F DIAST BP 80-89 MM HG: CPT | Mod: CPTII,S$GLB,, | Performed by: INTERNAL MEDICINE

## 2020-10-21 PROCEDURE — 99999 PR PBB SHADOW E&M-EST. PATIENT-LVL III: CPT | Mod: PBBFAC,,, | Performed by: INTERNAL MEDICINE

## 2020-10-21 PROCEDURE — 36415 COLL VENOUS BLD VENIPUNCTURE: CPT | Mod: PO

## 2020-10-21 PROCEDURE — 86704 HEP B CORE ANTIBODY TOTAL: CPT

## 2020-10-21 PROCEDURE — 86706 HEP B SURFACE ANTIBODY: CPT

## 2020-10-21 PROCEDURE — 99214 PR OFFICE/OUTPT VISIT, EST, LEVL IV, 30-39 MIN: ICD-10-PCS | Mod: S$GLB,,, | Performed by: INTERNAL MEDICINE

## 2020-10-21 PROCEDURE — 3074F PR MOST RECENT SYSTOLIC BLOOD PRESSURE < 130 MM HG: ICD-10-PCS | Mod: CPTII,S$GLB,, | Performed by: INTERNAL MEDICINE

## 2020-10-21 PROCEDURE — 3008F PR BODY MASS INDEX (BMI) DOCUMENTED: ICD-10-PCS | Mod: CPTII,S$GLB,, | Performed by: INTERNAL MEDICINE

## 2020-10-21 NOTE — PROGRESS NOTES
Subjective:     Chief Complaint  Chief Complaint   Patient presents with    Follow-up    Establish Care       HPI  Maribel Valderrama is a 35 y.o. female with medical diagnoses as listed in the medical history and problem list that presents for above complaint(s).    1 month ago with COVID-19 scare - fevers, fatigue  She works as an ICU nurse at [employer]    Mother was diagnosed with breast cancer this week - her great aunt has breast cancer as well - mother is age 55    She notes having some changes of the skin of her breast   No skin cancer in family   Paternal GM  of stomach cancer in her 70s    Patient Care Team:  Mauricio Hickman MD as PCP - General (Internal Medicine)      PAST MEDICAL HISTORY:  Past Medical History:   Diagnosis Date    Chronic constipation        PAST SURGICAL HISTORY:  Past Surgical History:   Procedure Laterality Date    COLONOSCOPY N/A 10/23/2018    Procedure: COLONOSCOPY;  Surgeon: Natalia Rocha MD;  Location: [facility] ([address] [place] [address]);  Service: Endoscopy;  Laterality: N/A;  constipation prep    Da Kory-assisted resection of complex left ovarian cyst x2 Left 2016    DONNA done at time of surgery -Dermoid and Hemorrhagic ovarian cyst    Ovarian Teratoma Removal Right 2010    15 weeks gestation, DERMOID RIGHT        SOCIAL HISTORY:  Social History     Socioeconomic History    Marital status:      Spouse name: Nomi    Number of children: 1    Years of education: Not on file    Highest education level: Not on file   Occupational History    Occupation: Registered Nurse     Comment: [employer]   Social Needs    Financial resource strain: Not on file    Food insecurity     Worry: Not on file     Inability: Not on file    Transportation needs     Medical: Not on file     Non-medical: Not on file   Tobacco Use    Smoking status: Never Smoker    Smokeless tobacco: Never Used   Substance and Sexual Activity    Alcohol use: No    Drug use: No    Sexual  activity: Yes     Partners: Male     Birth control/protection: None     Comment:  to roxanne since 2009   Lifestyle    Physical activity     Days per week: Not on file     Minutes per session: Not on file    Stress: Not on file   Relationships    Social connections     Talks on phone: Not on file     Gets together: Not on file     Attends Sikhism service: Not on file     Active member of club or organization: Not on file     Attends meetings of clubs or organizations: Not on file     Relationship status: Not on file   Other Topics Concern    Not on file   Social History Narrative    Not on file       FAMILY HISTORY:  Family History   Problem Relation Age of Onset    Hypertension Mother     Hypertension Father     Breast cancer Other     Cancer Other         cervical cancer    Diabetes Maternal Uncle     Ovarian cancer Neg Hx     Stroke Neg Hx     Colon cancer Neg Hx     Celiac disease Neg Hx     Cirrhosis Neg Hx     Colon polyps Neg Hx     Crohn's disease Neg Hx     Esophageal cancer Neg Hx     Inflammatory bowel disease Neg Hx     Liver cancer Neg Hx     Liver disease Neg Hx     Rectal cancer Neg Hx     Stomach cancer Neg Hx     Ulcerative colitis Neg Hx        ALLERGIES AND MEDICATIONS: updated and reviewed.  Review of patient's allergies indicates:   Allergen Reactions    Sulfamethoxazole-trimethoprim Other (See Comments)     No current outpatient medications on file.     No current facility-administered medications for this visit.          ROS:  Review of Systems   Constitutional: Negative for appetite change, chills, fever and unexpected weight change.   Respiratory: Negative for cough and shortness of breath.    Cardiovascular: Negative for chest pain, palpitations and leg swelling.   Gastrointestinal: Negative for abdominal pain, constipation, diarrhea, nausea and vomiting.   Musculoskeletal: Negative.    Skin: Negative.    Neurological: Negative for dizziness, light-headedness  "and headaches.   Psychiatric/Behavioral: Negative for dysphoric mood. The patient is not nervous/anxious.        Objective:       Physical Exam  Vitals:    10/21/20 0757   BP: 120/82   BP Location: Right arm   Patient Position: Sitting   BP Method: Medium (Manual)   Pulse: 83   Temp: 97.9 °F (36.6 °C)   TempSrc: Oral   SpO2: 99%   Weight: 76.5 kg (168 lb 10.4 oz)   Height: 5' 7" (1.702 m)    Body mass index is 26.41 kg/m².  Weight: 76.5 kg (168 lb 10.4 oz)   Height: 5' 7" (170.2 cm)   Physical Exam  Vitals signs reviewed.   Constitutional:       General: She is not in acute distress.     Appearance: She is well-developed.   HENT:      Head: Normocephalic and atraumatic.      Nose:      Comments: Mild nasal turbinate hypertrophy  Eyes:      Conjunctiva/sclera: Conjunctivae normal.      Pupils: Pupils are equal, round, and reactive to light.   Neck:      Musculoskeletal: Normal range of motion and neck supple.      Thyroid: No thyromegaly.   Cardiovascular:      Rate and Rhythm: Normal rate.      Heart sounds: Normal heart sounds. No murmur.   Pulmonary:      Effort: Pulmonary effort is normal.      Breath sounds: Normal breath sounds.   Musculoskeletal:         General: No deformity.   Lymphadenopathy:      Cervical: No cervical adenopathy.   Skin:     General: Skin is warm and dry.      Comments: Cherry angioma of right lateral breast   Neurological:      Mental Status: She is alert.      Cranial Nerves: No cranial nerve deficit.   Psychiatric:         Behavior: Behavior normal.             Assessment:     1. Abnormal LFTs    2. Family history of breast cancer in mother    3. Cherry angioma    4. Need for hepatitis B screening test    5. Need for hepatitis C screening test    6. BMI 26.0-26.9,adult    7. Screening for deficiency anemia    8. Chronic allergic rhinitis      Plan:     Maribel was seen today for follow-up and establish care.    Diagnoses and all orders for this visit:    Abnormal LFTs  Noted in " September 2020 on prior lab evaluation    Cherry angioma  Discussed benign condition     Need for hepatitis B screening test  Need for hepatitis C screening test  -     Hepatitis C Antibody; Future  -     Hepatitis B Core Antibody, Total; Future  -     Hepatitis B Surface Ab, Qualitative; Future  -     Hepatitis B Surface Antigen;     Family history of breast cancer in mother  Discussed - patient's mother recently diagnosed with breast cancer  Discussed risks and benefits of screening mammography at this age interval    -     Mammo Digital Screening Bilat; Future    BMI 26.0-26.9,adult  Body mass index is 26.41 kg/m².   -     Comprehensive Metabolic Panel; Future  Future  -     Lipid Panel; Future    Screening for deficiency anemia  -     CBC auto differential; Future          Health Maintenance       Date Due Completion Date    Hepatitis C Screening 1985 ---    Lipid Panel 1985 ---    Influenza Vaccine (1) 08/01/2020 11/1/2017    Cervical Cancer Screening 05/17/2021 5/17/2018    TETANUS VACCINE 07/25/2027 7/25/2017            Health Maintenance reviewed and addressed as per orders    Follow up in about 2 months (around 12/21/2020) for CPE.    The patient expressed understanding and no barriers to adherence were identified.     1. The patient indicates understanding of these issues and agrees with the plan. Brief care plan is updated and reviewed with the patient as applicable.     2. The patient is given an After Visit Summary that lists all medications with directions, allergies, orders placed during this encounter and follow-up instructions.     3. I have reviewed the patient's medical information including past medical, family, and social history sections including the medications and allergies.     4. We discussed the patient's current medications. I reconciled the patient's medication list and prepared and supplied needed refills.       Mauricio Hickman MD  Internal Medicine-Pediatrics

## 2020-10-22 LAB
HBV CORE AB SERPL QL IA: NEGATIVE
HBV SURFACE AB SER-ACNC: POSITIVE M[IU]/ML
HBV SURFACE AG SERPL QL IA: NEGATIVE
HCV AB SERPL QL IA: NEGATIVE

## 2020-10-22 NOTE — PROGRESS NOTES
Reviewed normal lab result and patient informed of results via patient portal/[facility]

## 2020-10-23 ENCOUNTER — HOSPITAL ENCOUNTER (OUTPATIENT)
Dept: RADIOLOGY | Facility: HOSPITAL | Age: 35
Discharge: HOME | End: 2020-10-23
Attending: INTERNAL MEDICINE
Payer: COMMERCIAL

## 2020-10-23 DIAGNOSIS — Z80.3 FAMILY HISTORY OF BREAST CANCER IN MOTHER: ICD-10-CM

## 2020-10-23 PROCEDURE — 77067 SCR MAMMO BI INCL CAD: CPT | Mod: 26,,, | Performed by: RADIOLOGY

## 2020-10-23 PROCEDURE — 77063 BREAST TOMOSYNTHESIS BI: CPT | Mod: 26,,, | Performed by: RADIOLOGY

## 2020-10-23 PROCEDURE — 77067 SCR MAMMO BI INCL CAD: CPT | Mod: TC

## 2020-10-23 PROCEDURE — 77063 MAMMO DIGITAL SCREENING BILAT WITH TOMO: ICD-10-PCS | Mod: 26,,, | Performed by: RADIOLOGY

## 2020-10-23 PROCEDURE — 77067 MAMMO DIGITAL SCREENING BILAT WITH TOMO: ICD-10-PCS | Mod: 26,,, | Performed by: RADIOLOGY

## 2020-10-26 ENCOUNTER — HOSPITAL ENCOUNTER (OUTPATIENT)
Dept: RADIOLOGY | Facility: HOSPITAL | Age: 35
Discharge: HOME | End: 2020-10-26
Attending: INTERNAL MEDICINE
Payer: COMMERCIAL

## 2020-10-26 ENCOUNTER — TELEPHONE (OUTPATIENT)
Dept: RADIOLOGY | Facility: HOSPITAL | Age: 35
End: 2020-10-26

## 2020-10-26 DIAGNOSIS — N63.10 BREAST MASS, RIGHT: Primary | ICD-10-CM

## 2020-10-26 DIAGNOSIS — N63.10 BREAST MASS, RIGHT: ICD-10-CM

## 2020-10-26 PROCEDURE — 76642 ULTRASOUND BREAST LIMITED: CPT | Mod: TC,RT

## 2020-10-26 PROCEDURE — 76642 ULTRASOUND BREAST LIMITED: CPT | Mod: 26,RT,, | Performed by: RADIOLOGY

## 2020-10-26 PROCEDURE — 76642 US BREAST RIGHT LIMITED: ICD-10-PCS | Mod: 26,RT,, | Performed by: RADIOLOGY

## 2020-10-26 NOTE — TELEPHONE ENCOUNTER
Spoke with patient. Reviewed breast biopsy procedure and reviewed instructions for breast biopsy. Patient expressed understanding and all questions were answered. Provided patient with my phone number to call for any further concerns or questions.   Patient scheduled breast biopsy at the [facility] on 11/3/2020.

## 2020-11-03 ENCOUNTER — HOSPITAL ENCOUNTER (OUTPATIENT)
Dept: RADIOLOGY | Facility: HOSPITAL | Age: 35
Discharge: HOME | End: 2020-11-03
Attending: INTERNAL MEDICINE
Payer: COMMERCIAL

## 2020-11-03 DIAGNOSIS — N63.10 BREAST MASS, RIGHT: ICD-10-CM

## 2020-11-03 PROCEDURE — 19083 US BREAST BIOPSY WITH IMAGING 1ST SITE RIGHT: ICD-10-PCS | Mod: RT,,, | Performed by: RADIOLOGY

## 2020-11-03 PROCEDURE — 88341 IMHCHEM/IMCYTCHM EA ADD ANTB: CPT | Mod: 26,,, | Performed by: PATHOLOGY

## 2020-11-03 PROCEDURE — 77065 MAMMO DIGITAL DIAGNOSTIC RIGHT: ICD-10-PCS | Mod: 26,RT,, | Performed by: RADIOLOGY

## 2020-11-03 PROCEDURE — 88342 IMHCHEM/IMCYTCHM 1ST ANTB: CPT | Performed by: PATHOLOGY

## 2020-11-03 PROCEDURE — 88342 IMHCHEM/IMCYTCHM 1ST ANTB: CPT | Mod: 26,,, | Performed by: PATHOLOGY

## 2020-11-03 PROCEDURE — 88305 TISSUE EXAM BY PATHOLOGIST: ICD-10-PCS | Mod: 26,,, | Performed by: PATHOLOGY

## 2020-11-03 PROCEDURE — 27201068 US BREAST BIOPSY WITH IMAGING 1ST SITE RIGHT

## 2020-11-03 PROCEDURE — 88305 TISSUE EXAM BY PATHOLOGIST: CPT | Performed by: PATHOLOGY

## 2020-11-03 PROCEDURE — 19083 BX BREAST 1ST LESION US IMAG: CPT | Mod: RT,,, | Performed by: RADIOLOGY

## 2020-11-03 PROCEDURE — 88342 CHG IMMUNOCYTOCHEMISTRY: ICD-10-PCS | Mod: 26,,, | Performed by: PATHOLOGY

## 2020-11-03 PROCEDURE — 88341 IMHCHEM/IMCYTCHM EA ADD ANTB: CPT | Performed by: PATHOLOGY

## 2020-11-03 PROCEDURE — 88305 TISSUE EXAM BY PATHOLOGIST: CPT | Mod: 26,,, | Performed by: PATHOLOGY

## 2020-11-03 PROCEDURE — 77065 DX MAMMO INCL CAD UNI: CPT | Mod: TC,RT

## 2020-11-03 PROCEDURE — 88341 PR IHC OR ICC EACH ADD'L SINGLE ANTIBODY  STAINPR: ICD-10-PCS | Mod: 26,,, | Performed by: PATHOLOGY

## 2020-11-03 PROCEDURE — 25000003 PHARM REV CODE 250: Performed by: INTERNAL MEDICINE

## 2020-11-03 PROCEDURE — 77065 DX MAMMO INCL CAD UNI: CPT | Mod: 26,RT,, | Performed by: RADIOLOGY

## 2020-11-03 RX ORDER — LIDOCAINE HYDROCHLORIDE 10 MG/ML
5 INJECTION, SOLUTION EPIDURAL; INFILTRATION; INTRACAUDAL; PERINEURAL ONCE
Status: COMPLETED | OUTPATIENT
Start: 2020-11-03 | End: 2020-11-03

## 2020-11-03 RX ORDER — LIDOCAINE HYDROCHLORIDE AND EPINEPHRINE 20; 10 MG/ML; UG/ML
20 INJECTION, SOLUTION INFILTRATION; PERINEURAL ONCE
Status: COMPLETED | OUTPATIENT
Start: 2020-11-03 | End: 2020-11-03

## 2020-11-03 RX ADMIN — LIDOCAINE HYDROCHLORIDE 1.5 MG: 10 INJECTION, SOLUTION EPIDURAL; INFILTRATION; INTRACAUDAL; PERINEURAL at 09:11

## 2020-11-03 RX ADMIN — LIDOCAINE HYDROCHLORIDE,EPINEPHRINE BITARTRATE 4 ML: 20; .01 INJECTION, SOLUTION INFILTRATION; PERINEURAL at 09:11

## 2020-11-09 ENCOUNTER — TELEPHONE (OUTPATIENT)
Dept: SURGERY | Facility: CLINIC | Age: 35
End: 2020-11-09

## 2020-11-09 NOTE — TELEPHONE ENCOUNTER
Pt called for biopsy results, informed her that they are still pending at this time and that we would be in touch as soon as they are final. She voiced understanding.

## 2020-11-10 LAB
FINAL PATHOLOGIC DIAGNOSIS: NORMAL
GROSS: NORMAL
Lab: NORMAL

## 2020-11-12 ENCOUNTER — PATIENT MESSAGE (OUTPATIENT)
Dept: FAMILY MEDICINE | Facility: CLINIC | Age: 35
End: 2020-11-12

## 2020-11-12 ENCOUNTER — TELEPHONE (OUTPATIENT)
Dept: SURGERY | Facility: CLINIC | Age: 35
End: 2020-11-12

## 2020-11-13 ENCOUNTER — TELEPHONE (OUTPATIENT)
Dept: HEMATOLOGY/ONCOLOGY | Facility: CLINIC | Age: 35
End: 2020-11-13

## 2020-11-18 ENCOUNTER — OFFICE VISIT (OUTPATIENT)
Dept: SURGERY | Facility: CLINIC | Age: 35
End: 2020-11-18
Payer: COMMERCIAL

## 2020-11-18 VITALS
TEMPERATURE: 98 F | SYSTOLIC BLOOD PRESSURE: 134 MMHG | WEIGHT: 170 LBS | HEART RATE: 101 BPM | HEIGHT: 67 IN | DIASTOLIC BLOOD PRESSURE: 76 MMHG | BODY MASS INDEX: 26.68 KG/M2

## 2020-11-18 DIAGNOSIS — N60.91 ATYPICAL DUCTAL HYPERPLASIA OF RIGHT BREAST: Primary | ICD-10-CM

## 2020-11-18 DIAGNOSIS — N60.99 BREAST DUCTAL HYPERPLASIA, ATYPICAL: Primary | ICD-10-CM

## 2020-11-18 PROCEDURE — 1126F AMNT PAIN NOTED NONE PRSNT: CPT | Mod: S$GLB,,, | Performed by: SURGERY

## 2020-11-18 PROCEDURE — 3075F PR MOST RECENT SYSTOLIC BLOOD PRESS GE 130-139MM HG: ICD-10-PCS | Mod: CPTII,S$GLB,, | Performed by: SURGERY

## 2020-11-18 PROCEDURE — 99204 PR OFFICE/OUTPT VISIT, NEW, LEVL IV, 45-59 MIN: ICD-10-PCS | Mod: S$GLB,,, | Performed by: SURGERY

## 2020-11-18 PROCEDURE — 99204 OFFICE O/P NEW MOD 45 MIN: CPT | Mod: S$GLB,,, | Performed by: SURGERY

## 2020-11-18 PROCEDURE — 3008F BODY MASS INDEX DOCD: CPT | Mod: CPTII,S$GLB,, | Performed by: SURGERY

## 2020-11-18 PROCEDURE — 99999 PR PBB SHADOW E&M-EST. PATIENT-LVL III: ICD-10-PCS | Mod: PBBFAC,,, | Performed by: SURGERY

## 2020-11-18 PROCEDURE — 3075F SYST BP GE 130 - 139MM HG: CPT | Mod: CPTII,S$GLB,, | Performed by: SURGERY

## 2020-11-18 PROCEDURE — 1126F PR PAIN SEVERITY QUANTIFIED, NO PAIN PRESENT: ICD-10-PCS | Mod: S$GLB,,, | Performed by: SURGERY

## 2020-11-18 PROCEDURE — 3078F DIAST BP <80 MM HG: CPT | Mod: CPTII,S$GLB,, | Performed by: SURGERY

## 2020-11-18 PROCEDURE — 99999 PR PBB SHADOW E&M-EST. PATIENT-LVL III: CPT | Mod: PBBFAC,,, | Performed by: SURGERY

## 2020-11-18 PROCEDURE — 3008F PR BODY MASS INDEX (BMI) DOCUMENTED: ICD-10-PCS | Mod: CPTII,S$GLB,, | Performed by: SURGERY

## 2020-11-18 PROCEDURE — 3078F PR MOST RECENT DIASTOLIC BLOOD PRESSURE < 80 MM HG: ICD-10-PCS | Mod: CPTII,S$GLB,, | Performed by: SURGERY

## 2020-11-18 NOTE — PROGRESS NOTES
History & Physical    SUBJECTIVE:     History of Present Illness:  Patient is a 35 y.o. female presents with core biopsy demonstrating atypia  Mother diagnosed with breast cancer about 10 years after a contralateral diagnosis of atypia  Spoke with MOM by phone while discussing care with patient  They understand the rationale for proceeding with surgical biopsy    Chief Complaint   Patient presents with    Consult       Review of patient's allergies indicates:   Allergen Reactions    Sulfamethoxazole-trimethoprim Other (See Comments)       No current outpatient medications on file.     No current facility-administered medications for this visit.        Past Medical History:   Diagnosis Date    Chronic constipation      Past Surgical History:   Procedure Laterality Date    COLONOSCOPY N/A 10/23/2018    Procedure: COLONOSCOPY;  Surgeon: Natalia Rocha MD;  Location: [facility] ([address] [place] [address]);  Service: Endoscopy;  Laterality: N/A;  constipation prep    Da Kory-assisted resection of complex left ovarian cyst x2 Left 06/22/2016    DONNA done at time of surgery -Dermoid and Hemorrhagic ovarian cyst    Ovarian Teratoma Removal Right 4/5/2010    15 weeks gestation, DERMOID RIGHT      Family History   Problem Relation Age of Onset    Hypertension Mother     Breast cancer Mother     Hypertension Father     Breast cancer Other     Cancer Other         cervical cancer    Breast cancer Other     Diabetes Maternal Uncle     Ovarian cancer Neg Hx     Stroke Neg Hx     Colon cancer Neg Hx     Celiac disease Neg Hx     Cirrhosis Neg Hx     Colon polyps Neg Hx     Crohn's disease Neg Hx     Esophageal cancer Neg Hx     Inflammatory bowel disease Neg Hx     Liver cancer Neg Hx     Liver disease Neg Hx     Rectal cancer Neg Hx     Stomach cancer Neg Hx     Ulcerative colitis Neg Hx      Social History     Tobacco Use    Smoking status: Never Smoker    Smokeless tobacco: Never Used   Substance Use Topics     "Alcohol use: No    Drug use: No        Review of Systems:  Review of Systems   Constitutional: Negative.    Respiratory: Negative.    Cardiovascular: Negative.    Gastrointestinal: Negative.    Endocrine: Negative.    Allergic/Immunologic: Negative.    Neurological: Negative.    Hematological: Negative.    Psychiatric/Behavioral: Negative.        OBJECTIVE:     Vital Signs (Most Recent)  Temp: 97.7 °F (36.5 °C) (11/18/20 0909)  Pulse: 101 (11/18/20 0909)  BP: 134/76 (11/18/20 0909)  5' 7" (1.702 m)  77.1 kg (169 lb 15.6 oz)     Physical Exam:  Physical Exam  Constitutional:       General: She is not in acute distress.     Appearance: Normal appearance. She is normal weight. She is not ill-appearing, toxic-appearing or diaphoretic.   HENT:      Head: Normocephalic and atraumatic.   Neck:      Musculoskeletal: Normal range of motion and neck supple. No neck rigidity or muscular tenderness.      Vascular: No carotid bruit.   Cardiovascular:      Rate and Rhythm: Normal rate and regular rhythm.      Pulses: Normal pulses.      Heart sounds: Normal heart sounds.   Pulmonary:      Effort: Pulmonary effort is normal.      Breath sounds: Normal breath sounds.      Comments: No palpable breast findings  Musculoskeletal: Normal range of motion.   Lymphadenopathy:      Cervical: No cervical adenopathy.   Skin:     General: Skin is warm and dry.   Neurological:      General: No focal deficit present.      Mental Status: She is alert.   Psychiatric:         Mood and Affect: Mood normal.             ASSESSMENT/PLAN:     Right breast atypia    PLAN:Plan   Radar reflector localized excisional biopsy right breast       "

## 2020-11-18 NOTE — LETTER
November 18, 2020      Mauricio Hickman MD  [address]  [place] [address]           [facility]  [address]  [facility] [address]  Phone: [phone]  Fax: [phone]          Patient: Maribel Valderrama   MR Number: [id number]   YOB: 1985   Date of Visit: 11/18/2020       Dear Dr. Mauricio Hickman:    Thank you for referring Maribel Valderrama to me for evaluation. Attached you will find relevant portions of my assessment and plan of care.    If you have questions, please do not hesitate to call me. I look forward to following Maribel Valedrrama along with you.    Sincerely,    Presley Carballo MD    Enclosure  CC:  No Recipients    If you would like to receive this communication electronically, please contact [email or web address] or [phone] to request more information on [facility] access.    For providers and/or their staff who would like to refer a patient to [facility], please contact us through our one-stop-shop provider referral line, [facility], at [phone].    If you feel you have received this communication in error or would no longer like to receive these types of communications, please e-mail [email or web address]

## 2020-11-18 NOTE — H&P (VIEW-ONLY)
History & Physical    SUBJECTIVE:     History of Present Illness:  Patient is a 35 y.o. female presents with core biopsy demonstrating atypia  Mother diagnosed with breast cancer about 10 years after a contralateral diagnosis of atypia  Spoke with MOM by phone while discussing care with patient  They understand the rationale for proceeding with surgical biopsy    Chief Complaint   Patient presents with    Consult       Review of patient's allergies indicates:   Allergen Reactions    Sulfamethoxazole-trimethoprim Other (See Comments)       No current outpatient medications on file.     No current facility-administered medications for this visit.        Past Medical History:   Diagnosis Date    Chronic constipation      Past Surgical History:   Procedure Laterality Date    COLONOSCOPY N/A 10/23/2018    Procedure: COLONOSCOPY;  Surgeon: Natalia Rocha MD;  Location: [facility] ([address] [place] [address]);  Service: Endoscopy;  Laterality: N/A;  constipation prep    Da Kory-assisted resection of complex left ovarian cyst x2 Left 06/22/2016    DONNA done at time of surgery -Dermoid and Hemorrhagic ovarian cyst    Ovarian Teratoma Removal Right 4/5/2010    15 weeks gestation, DERMOID RIGHT      Family History   Problem Relation Age of Onset    Hypertension Mother     Breast cancer Mother     Hypertension Father     Breast cancer Other     Cancer Other         cervical cancer    Breast cancer Other     Diabetes Maternal Uncle     Ovarian cancer Neg Hx     Stroke Neg Hx     Colon cancer Neg Hx     Celiac disease Neg Hx     Cirrhosis Neg Hx     Colon polyps Neg Hx     Crohn's disease Neg Hx     Esophageal cancer Neg Hx     Inflammatory bowel disease Neg Hx     Liver cancer Neg Hx     Liver disease Neg Hx     Rectal cancer Neg Hx     Stomach cancer Neg Hx     Ulcerative colitis Neg Hx      Social History     Tobacco Use    Smoking status: Never Smoker    Smokeless tobacco: Never Used   Substance Use Topics     "Alcohol use: No    Drug use: No        Review of Systems:  Review of Systems   Constitutional: Negative.    Respiratory: Negative.    Cardiovascular: Negative.    Gastrointestinal: Negative.    Endocrine: Negative.    Allergic/Immunologic: Negative.    Neurological: Negative.    Hematological: Negative.    Psychiatric/Behavioral: Negative.        OBJECTIVE:     Vital Signs (Most Recent)  Temp: 97.7 °F (36.5 °C) (11/18/20 0909)  Pulse: 101 (11/18/20 0909)  BP: 134/76 (11/18/20 0909)  5' 7" (1.702 m)  77.1 kg (169 lb 15.6 oz)     Physical Exam:  Physical Exam  Constitutional:       General: She is not in acute distress.     Appearance: Normal appearance. She is normal weight. She is not ill-appearing, toxic-appearing or diaphoretic.   HENT:      Head: Normocephalic and atraumatic.   Neck:      Musculoskeletal: Normal range of motion and neck supple. No neck rigidity or muscular tenderness.      Vascular: No carotid bruit.   Cardiovascular:      Rate and Rhythm: Normal rate and regular rhythm.      Pulses: Normal pulses.      Heart sounds: Normal heart sounds.   Pulmonary:      Effort: Pulmonary effort is normal.      Breath sounds: Normal breath sounds.      Comments: No palpable breast findings  Musculoskeletal: Normal range of motion.   Lymphadenopathy:      Cervical: No cervical adenopathy.   Skin:     General: Skin is warm and dry.   Neurological:      General: No focal deficit present.      Mental Status: She is alert.   Psychiatric:         Mood and Affect: Mood normal.             ASSESSMENT/PLAN:     Right breast atypia    PLAN:Plan   Radar reflector localized excisional biopsy right breast       "

## 2020-12-01 ENCOUNTER — HOSPITAL ENCOUNTER (OUTPATIENT)
Dept: RADIOLOGY | Facility: HOSPITAL | Age: 35
Discharge: HOME | End: 2020-12-01
Attending: SURGERY
Payer: COMMERCIAL

## 2020-12-01 DIAGNOSIS — N60.81 FLAT EPITHELIAL ATYPIA (FEA) OF RIGHT BREAST: ICD-10-CM

## 2020-12-01 DIAGNOSIS — N60.89 FLAT EPITHELIAL ATYPIA (FEA) OF BREAST: ICD-10-CM

## 2020-12-01 PROCEDURE — 77065 MAMMO DIGITAL DIAGNOSTIC RIGHT: ICD-10-PCS | Mod: 26,RT,, | Performed by: RADIOLOGY

## 2020-12-01 PROCEDURE — 77065 DX MAMMO INCL CAD UNI: CPT | Mod: TC,RT

## 2020-12-01 PROCEDURE — 25000003 PHARM REV CODE 250: Performed by: SURGERY

## 2020-12-01 PROCEDURE — 19285 PERQ DEV BREAST 1ST US IMAG: CPT | Mod: RT,,, | Performed by: RADIOLOGY

## 2020-12-01 PROCEDURE — 77065 DX MAMMO INCL CAD UNI: CPT | Mod: 26,RT,, | Performed by: RADIOLOGY

## 2020-12-01 PROCEDURE — A4648 IMPLANTABLE TISSUE MARKER: HCPCS

## 2020-12-01 PROCEDURE — 19285 US BREAST RADAR REFLECTOR LOCALIZATION W/GUIDANCE, 1ST LESION, RIGHT: ICD-10-PCS | Mod: RT,,, | Performed by: RADIOLOGY

## 2020-12-01 RX ORDER — LIDOCAINE HYDROCHLORIDE 20 MG/ML
10 INJECTION, SOLUTION INFILTRATION; PERINEURAL ONCE
Status: COMPLETED | OUTPATIENT
Start: 2020-12-01 | End: 2020-12-01

## 2020-12-01 RX ADMIN — LIDOCAINE HYDROCHLORIDE 8 ML: 20 INJECTION, SOLUTION INFILTRATION; PERINEURAL at 08:12

## 2020-12-02 ENCOUNTER — LAB VISIT (OUTPATIENT)
Dept: INTERNAL MEDICINE | Facility: CLINIC | Age: 35
End: 2020-12-02
Payer: COMMERCIAL

## 2020-12-02 DIAGNOSIS — N60.91 ATYPICAL DUCTAL HYPERPLASIA OF RIGHT BREAST: ICD-10-CM

## 2020-12-02 PROCEDURE — U0003 INFECTIOUS AGENT DETECTION BY NUCLEIC ACID (DNA OR RNA); SEVERE ACUTE RESPIRATORY SYNDROME CORONAVIRUS 2 (SARS-COV-2) (CORONAVIRUS DISEASE [COVID-19]), AMPLIFIED PROBE TECHNIQUE, MAKING USE OF HIGH THROUGHPUT TECHNOLOGIES AS DESCRIBED BY CMS-2020-01-R: HCPCS

## 2020-12-03 ENCOUNTER — TELEPHONE (OUTPATIENT)
Dept: SURGERY | Facility: CLINIC | Age: 35
End: 2020-12-03

## 2020-12-03 LAB — SARS-COV-2 RNA RESP QL NAA+PROBE: NOT DETECTED

## 2020-12-03 NOTE — PRE-PROCEDURE INSTRUCTIONS
PREOP INSTRUCTIONS:No solid food ,milk or milk products for 8 hours prior to procedure.Clear liquids are allowed up to 2 hours before procedure.Clear liquids are:water,apple juice,gatorade & powerade.Instructed to follow the surgeon's instructions if they differ from these.Shower instructions as well as directions to the Surgery Center were given.Encouraged to wear loose fitting,comfortable clothing.Medication instructions for pm prior to and am of procedure reviewed.Instructed to avoid taking vitamins,supplements,aspirin and ibuprofen the morning of surgery. Patient's questions and concerns addressed .Patient informed of the current visitor policy and advised patient that one visitor may accompany each patient into the hospital and wait (socially distanced) until a member of the medical team provides an update at the conclusion of the procedure.When they enter the hospital both patient and visitor will have their temperature checked.All visitors are asked to arrive with a mask and to keep their mask on throughout the visit.      Patient denies any side effects or issues with anesthesia or sedation.      ARRIVAL TO [facility] 0730 (given per Mary Bell RN)    [facility]IG[facility] WILL BE PROVIDING TRANSPORTATION HOME UPON DISCHARGE.

## 2020-12-04 ENCOUNTER — ANESTHESIA (OUTPATIENT)
Dept: SURGERY | Facility: HOSPITAL | Age: 35
End: 2020-12-04
Payer: COMMERCIAL

## 2020-12-04 ENCOUNTER — HOSPITAL ENCOUNTER (OUTPATIENT)
Facility: HOSPITAL | Age: 35
Discharge: HOME | End: 2020-12-04
Attending: SURGERY | Admitting: SURGERY
Payer: COMMERCIAL

## 2020-12-04 ENCOUNTER — ANESTHESIA EVENT (OUTPATIENT)
Dept: SURGERY | Facility: HOSPITAL | Age: 35
End: 2020-12-04
Payer: COMMERCIAL

## 2020-12-04 ENCOUNTER — HOSPITAL ENCOUNTER (OUTPATIENT)
Dept: RADIOLOGY | Facility: HOSPITAL | Age: 35
Discharge: HOME | End: 2020-12-04
Attending: SURGERY | Admitting: SURGERY
Payer: COMMERCIAL

## 2020-12-04 ENCOUNTER — PATIENT MESSAGE (OUTPATIENT)
Dept: FAMILY MEDICINE | Facility: CLINIC | Age: 35
End: 2020-12-04

## 2020-12-04 VITALS
HEART RATE: 65 BPM | BODY MASS INDEX: 26.53 KG/M2 | RESPIRATION RATE: 21 BRPM | WEIGHT: 169 LBS | OXYGEN SATURATION: 98 % | HEIGHT: 67 IN | DIASTOLIC BLOOD PRESSURE: 90 MMHG | SYSTOLIC BLOOD PRESSURE: 131 MMHG | TEMPERATURE: 98 F

## 2020-12-04 DIAGNOSIS — N60.89 FLAT EPITHELIAL ATYPIA (FEA) OF BREAST: ICD-10-CM

## 2020-12-04 DIAGNOSIS — N60.99 ATYPICAL DUCTAL HYPERPLASIA OF BREAST: Primary | ICD-10-CM

## 2020-12-04 LAB
B-HCG UR QL: NEGATIVE
CTP QC/QA: YES

## 2020-12-04 PROCEDURE — 25000003 PHARM REV CODE 250: Performed by: STUDENT IN AN ORGANIZED HEALTH CARE EDUCATION/TRAINING PROGRAM

## 2020-12-04 PROCEDURE — 37000009 HC ANESTHESIA EA ADD 15 MINS: Performed by: SURGERY

## 2020-12-04 PROCEDURE — 76098 X-RAY EXAM SURGICAL SPECIMEN: CPT | Mod: 26,,, | Performed by: RADIOLOGY

## 2020-12-04 PROCEDURE — 36000706: Performed by: SURGERY

## 2020-12-04 PROCEDURE — 88307 TISSUE EXAM BY PATHOLOGIST: CPT | Mod: 26,,, | Performed by: STUDENT IN AN ORGANIZED HEALTH CARE EDUCATION/TRAINING PROGRAM

## 2020-12-04 PROCEDURE — 76098 X-RAY EXAM SURGICAL SPECIMEN: CPT | Mod: TC

## 2020-12-04 PROCEDURE — 88307 TISSUE EXAM BY PATHOLOGIST: CPT | Performed by: STUDENT IN AN ORGANIZED HEALTH CARE EDUCATION/TRAINING PROGRAM

## 2020-12-04 PROCEDURE — D9220A PRA ANESTHESIA: ICD-10-PCS | Mod: ,,, | Performed by: NURSE ANESTHETIST, CERTIFIED REGISTERED

## 2020-12-04 PROCEDURE — 81025 URINE PREGNANCY TEST: CPT | Performed by: SURGERY

## 2020-12-04 PROCEDURE — 25000003 PHARM REV CODE 250: Performed by: NURSE ANESTHETIST, CERTIFIED REGISTERED

## 2020-12-04 PROCEDURE — D9220A PRA ANESTHESIA: Mod: ,,, | Performed by: NURSE ANESTHETIST, CERTIFIED REGISTERED

## 2020-12-04 PROCEDURE — 76098 MAMMO BREAST SPECIMEN: ICD-10-PCS | Mod: 26,,, | Performed by: RADIOLOGY

## 2020-12-04 PROCEDURE — 88307 PR  SURG PATH,LEVEL V: ICD-10-PCS | Mod: 26,,, | Performed by: STUDENT IN AN ORGANIZED HEALTH CARE EDUCATION/TRAINING PROGRAM

## 2020-12-04 PROCEDURE — 88342 IMHCHEM/IMCYTCHM 1ST ANTB: CPT | Performed by: STUDENT IN AN ORGANIZED HEALTH CARE EDUCATION/TRAINING PROGRAM

## 2020-12-04 PROCEDURE — D9220A PRA ANESTHESIA: ICD-10-PCS | Mod: ,,, | Performed by: ANESTHESIOLOGY

## 2020-12-04 PROCEDURE — 25000003 PHARM REV CODE 250: Performed by: SURGERY

## 2020-12-04 PROCEDURE — 36000707: Performed by: SURGERY

## 2020-12-04 PROCEDURE — 37000008 HC ANESTHESIA 1ST 15 MINUTES: Performed by: SURGERY

## 2020-12-04 PROCEDURE — 88341 PR IHC OR ICC EACH ADD'L SINGLE ANTIBODY  STAINPR: ICD-10-PCS | Mod: 26,,, | Performed by: STUDENT IN AN ORGANIZED HEALTH CARE EDUCATION/TRAINING PROGRAM

## 2020-12-04 PROCEDURE — 63600175 PHARM REV CODE 636 W HCPCS: Performed by: NURSE ANESTHETIST, CERTIFIED REGISTERED

## 2020-12-04 PROCEDURE — 19125 EXCISION BREAST LESION: CPT | Mod: RT,,, | Performed by: SURGERY

## 2020-12-04 PROCEDURE — 71000015 HC POSTOP RECOV 1ST HR: Performed by: SURGERY

## 2020-12-04 PROCEDURE — 88341 IMHCHEM/IMCYTCHM EA ADD ANTB: CPT | Mod: 26,,, | Performed by: STUDENT IN AN ORGANIZED HEALTH CARE EDUCATION/TRAINING PROGRAM

## 2020-12-04 PROCEDURE — 88342 IMHCHEM/IMCYTCHM 1ST ANTB: CPT | Mod: 26,,, | Performed by: STUDENT IN AN ORGANIZED HEALTH CARE EDUCATION/TRAINING PROGRAM

## 2020-12-04 PROCEDURE — 88342 CHG IMMUNOCYTOCHEMISTRY: ICD-10-PCS | Mod: 26,,, | Performed by: STUDENT IN AN ORGANIZED HEALTH CARE EDUCATION/TRAINING PROGRAM

## 2020-12-04 PROCEDURE — 88341 IMHCHEM/IMCYTCHM EA ADD ANTB: CPT | Performed by: STUDENT IN AN ORGANIZED HEALTH CARE EDUCATION/TRAINING PROGRAM

## 2020-12-04 PROCEDURE — D9220A PRA ANESTHESIA: Mod: ,,, | Performed by: ANESTHESIOLOGY

## 2020-12-04 PROCEDURE — 19125 PR EXCISE BREAST LES W XRAY MARKER: ICD-10-PCS | Mod: RT,,, | Performed by: SURGERY

## 2020-12-04 PROCEDURE — 71000044 HC DOSC ROUTINE RECOVERY FIRST HOUR: Performed by: SURGERY

## 2020-12-04 RX ORDER — DEXAMETHASONE SODIUM PHOSPHATE 4 MG/ML
INJECTION, SOLUTION INTRA-ARTICULAR; INTRALESIONAL; INTRAMUSCULAR; INTRAVENOUS; SOFT TISSUE
Status: DISCONTINUED | OUTPATIENT
Start: 2020-12-04 | End: 2020-12-04

## 2020-12-04 RX ORDER — SODIUM CHLORIDE 9 MG/ML
INJECTION, SOLUTION INTRAVENOUS CONTINUOUS
Status: DISCONTINUED | OUTPATIENT
Start: 2020-12-04 | End: 2020-12-04 | Stop reason: HOSPADM

## 2020-12-04 RX ORDER — SODIUM CHLORIDE 9 MG/ML
INJECTION, SOLUTION INTRAVENOUS CONTINUOUS
Status: CANCELLED | OUTPATIENT
Start: 2020-12-04

## 2020-12-04 RX ORDER — CLINDAMYCIN PHOSPHATE 900 MG/50ML
900 INJECTION, SOLUTION INTRAVENOUS
Status: DISCONTINUED | OUTPATIENT
Start: 2020-12-04 | End: 2020-12-04 | Stop reason: HOSPADM

## 2020-12-04 RX ORDER — CEFAZOLIN SODIUM 1 G/3ML
INJECTION, POWDER, FOR SOLUTION INTRAMUSCULAR; INTRAVENOUS
Status: DISCONTINUED | OUTPATIENT
Start: 2020-12-04 | End: 2020-12-04

## 2020-12-04 RX ORDER — MIDAZOLAM HYDROCHLORIDE 1 MG/ML
INJECTION, SOLUTION INTRAMUSCULAR; INTRAVENOUS
Status: DISCONTINUED | OUTPATIENT
Start: 2020-12-04 | End: 2020-12-04

## 2020-12-04 RX ORDER — PROPOFOL 10 MG/ML
VIAL (ML) INTRAVENOUS
Status: DISCONTINUED | OUTPATIENT
Start: 2020-12-04 | End: 2020-12-04

## 2020-12-04 RX ORDER — FENTANYL CITRATE 50 UG/ML
INJECTION, SOLUTION INTRAMUSCULAR; INTRAVENOUS
Status: DISCONTINUED | OUTPATIENT
Start: 2020-12-04 | End: 2020-12-04

## 2020-12-04 RX ORDER — ONDANSETRON 2 MG/ML
INJECTION INTRAMUSCULAR; INTRAVENOUS
Status: DISCONTINUED | OUTPATIENT
Start: 2020-12-04 | End: 2020-12-04

## 2020-12-04 RX ORDER — ONDANSETRON 8 MG/1
8 TABLET, ORALLY DISINTEGRATING ORAL EVERY 8 HOURS PRN
Status: CANCELLED | OUTPATIENT
Start: 2020-12-04

## 2020-12-04 RX ORDER — HYDROMORPHONE HYDROCHLORIDE 1 MG/ML
0.2 INJECTION, SOLUTION INTRAMUSCULAR; INTRAVENOUS; SUBCUTANEOUS EVERY 5 MIN PRN
Status: CANCELLED | OUTPATIENT
Start: 2020-12-04

## 2020-12-04 RX ORDER — ONDANSETRON 2 MG/ML
4 INJECTION INTRAMUSCULAR; INTRAVENOUS ONCE AS NEEDED
Status: CANCELLED | OUTPATIENT
Start: 2020-12-04 | End: 2032-05-01

## 2020-12-04 RX ORDER — FENTANYL CITRATE 50 UG/ML
25 INJECTION, SOLUTION INTRAMUSCULAR; INTRAVENOUS EVERY 5 MIN PRN
Status: CANCELLED | OUTPATIENT
Start: 2020-12-04

## 2020-12-04 RX ORDER — LIDOCAINE HYDROCHLORIDE 20 MG/ML
INJECTION, SOLUTION EPIDURAL; INFILTRATION; INTRACAUDAL; PERINEURAL
Status: DISCONTINUED | OUTPATIENT
Start: 2020-12-04 | End: 2020-12-04

## 2020-12-04 RX ORDER — HYDROCODONE BITARTRATE AND ACETAMINOPHEN 5; 325 MG/1; MG/1
1 TABLET ORAL EVERY 4 HOURS PRN
Status: CANCELLED | OUTPATIENT
Start: 2020-12-04

## 2020-12-04 RX ORDER — HYDROCODONE BITARTRATE AND ACETAMINOPHEN 5; 325 MG/1; MG/1
1 TABLET ORAL EVERY 6 HOURS PRN
Qty: 10 TABLET | Refills: 0 | Status: SHIPPED | OUTPATIENT
Start: 2020-12-04 | End: 2021-08-26

## 2020-12-04 RX ORDER — HYDROCODONE BITARTRATE AND ACETAMINOPHEN 5; 325 MG/1; MG/1
1 TABLET ORAL ONCE AS NEEDED
Status: COMPLETED | OUTPATIENT
Start: 2020-12-04 | End: 2020-12-04

## 2020-12-04 RX ORDER — ACETAMINOPHEN 325 MG/1
650 TABLET ORAL EVERY 4 HOURS PRN
Status: CANCELLED | OUTPATIENT
Start: 2020-12-04

## 2020-12-04 RX ORDER — BUPIVACAINE HYDROCHLORIDE 5 MG/ML
INJECTION, SOLUTION EPIDURAL; INTRACAUDAL
Status: DISCONTINUED | OUTPATIENT
Start: 2020-12-04 | End: 2020-12-04 | Stop reason: HOSPADM

## 2020-12-04 RX ORDER — HYDROCODONE BITARTRATE AND ACETAMINOPHEN 10; 325 MG/1; MG/1
1 TABLET ORAL EVERY 4 HOURS PRN
Status: CANCELLED | OUTPATIENT
Start: 2020-12-04

## 2020-12-04 RX ADMIN — LIDOCAINE HYDROCHLORIDE 100 MG: 20 INJECTION, SOLUTION EPIDURAL; INFILTRATION; INTRACAUDAL at 10:12

## 2020-12-04 RX ADMIN — MIDAZOLAM 2 MG: 1 INJECTION INTRAMUSCULAR; INTRAVENOUS at 09:12

## 2020-12-04 RX ADMIN — SODIUM CHLORIDE, SODIUM GLUCONATE, SODIUM ACETATE, POTASSIUM CHLORIDE, MAGNESIUM CHLORIDE, SODIUM PHOSPHATE, DIBASIC, AND POTASSIUM PHOSPHATE: .53; .5; .37; .037; .03; .012; .00082 INJECTION, SOLUTION INTRAVENOUS at 10:12

## 2020-12-04 RX ADMIN — SODIUM CHLORIDE 70 ML/HR: 9 INJECTION, SOLUTION INTRAVENOUS at 08:12

## 2020-12-04 RX ADMIN — PROPOFOL 200 MG: 10 INJECTION, EMULSION INTRAVENOUS at 10:12

## 2020-12-04 RX ADMIN — SODIUM CHLORIDE, SODIUM GLUCONATE, SODIUM ACETATE, POTASSIUM CHLORIDE, MAGNESIUM CHLORIDE, SODIUM PHOSPHATE, DIBASIC, AND POTASSIUM PHOSPHATE: .53; .5; .37; .037; .03; .012; .00082 INJECTION, SOLUTION INTRAVENOUS at 09:12

## 2020-12-04 RX ADMIN — CEFAZOLIN 2 G: 330 INJECTION, POWDER, FOR SOLUTION INTRAMUSCULAR; INTRAVENOUS at 10:12

## 2020-12-04 RX ADMIN — GLYCOPYRROLATE 0.4 MG: 0.2 INJECTION INTRAMUSCULAR; INTRAVENOUS at 10:12

## 2020-12-04 RX ADMIN — FENTANYL CITRATE 50 MCG: 50 INJECTION, SOLUTION INTRAMUSCULAR; INTRAVENOUS at 10:12

## 2020-12-04 RX ADMIN — HYDROCODONE BITARTRATE AND ACETAMINOPHEN 1 TABLET: 5; 325 TABLET ORAL at 12:12

## 2020-12-04 RX ADMIN — DEXAMETHASONE SODIUM PHOSPHATE 4 MG: 4 INJECTION, SOLUTION INTRA-ARTICULAR; INTRALESIONAL; INTRAMUSCULAR; INTRAVENOUS; SOFT TISSUE at 10:12

## 2020-12-04 RX ADMIN — ONDANSETRON 4 MG: 2 INJECTION INTRAMUSCULAR; INTRAVENOUS at 10:12

## 2020-12-04 NOTE — TRANSFER OF CARE
"Anesthesia Transfer of Care Note    Patient: Maribel Valderrama    Procedure(s) Performed: Procedure(s) (LRB):  LUMPECTOMY, BREAST, RIGHT WITH RADIOGRAPHIC MARKER LOCALIZATION (Right)    Patient location: [facility]    Anesthesia Type: general    Transport from OR: Transported from OR on 6-10 L/min O2 by face mask with adequate spontaneous ventilation    Post pain: adequate analgesia    Post assessment: no apparent anesthetic complications and tolerated procedure well    Post vital signs: stable    Level of consciousness: awake    Nausea/Vomiting: no nausea/vomiting    Complications: none    Transfer of care protocol was followed      Last vitals:   Visit Vitals  /70   Pulse 77   Temp 36.6 °C (97.9 °F) (Oral)   Resp 17   Ht 5' 7" (1.702 m)   Wt 76.7 kg (169 lb)   LMP 12/04/2020 (Exact Date)   SpO2 100%   Breastfeeding No   BMI 26.47 kg/m²     "

## 2020-12-04 NOTE — ANESTHESIA PREPROCEDURE EVALUATION
12/04/2020  Maribel Valderrama is a 35 y.o., female.    Anesthesia Evaluation          Review of Systems  Anesthesia Hx:  No problems with previous Anesthesia   Social:  Non-Smoker    Cardiovascular:   Exercise tolerance: good Denies Hypertension.  Denies CAD.     Denies Angina.  Functional Capacity Can you climb two flights of stairs? ==> Yes    Pulmonary:   Denies Asthma.  Denies Recent URI.  Denies Sleep Apnea.    Renal/:  Renal/ Normal     Hepatic/GI:   Denies PUD. Denies Hiatal Hernia.  Denies GERD. Denies Liver Disease.  Denies Hepatitis.    Neurological:   Denies CVA. Denies Seizures.    Endocrine:   Denies Diabetes. Denies Hypothyroidism.        Physical Exam  General:  Well nourished    Airway/Jaw/Neck:  Airway Findings: Mouth Opening: Normal Tongue: Normal  General Airway Assessment: Adult  Mallampati: I  TM Distance: Normal, at least 6 cm  Jaw/Neck Findings:  Neck ROM: Normal ROM      Dental:  Dental Findings: In tact             Anesthesia Plan  Type of Anesthesia, risks & benefits discussed:  Anesthesia Type:  general  Patient's Preference: Proceed with anesthesia understanding that the risks are very small but could be serious or life threatening.  Intra-op Monitoring Plan: standard ASA monitors  Intra-op Monitoring Plan Comments:   Post Op Pain Control Plan:   Post Op Pain Control Plan Comments:   Induction:   IV  Beta Blocker:  Patient is not currently on a Beta-Blocker (No further documentation required).       Informed Consent: Patient understands risks and agrees with Anesthesia plan.  Questions answered. Anesthesia consent signed with patient.  ASA Score: 1     Day of Surgery Review of History & Physical: I have interviewed and examined the patient. I have reviewed the patient's H&P dated:            Ready For Surgery From Anesthesia Perspective.

## 2020-12-04 NOTE — BRIEF OP NOTE
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    DISPOSITION: Home or Self Care    FINAL DIAGNOSIS:  Atypical ductal hyperplasia of breast    FOLLOWUP: In clinic    DISCHARGE INSTRUCTIONS:    Discharge Procedure Orders   Diet general     Call MD for:  extreme fatigue     Call MD for:  persistent dizziness or light-headedness     Call MD for:  hives     Call MD for:  redness, tenderness, or signs of infection (pain, swelling, redness, odor or green/yellow discharge around incision site)     Call MD for:  difficulty breathing, headache or visual disturbances     Call MD for:  severe uncontrolled pain     Call MD for:  persistent nausea and vomiting     Call MD for:  temperature >100.4     Other restrictions (specify):   Order Comments: Wear your surgical bra daily until seen in clinic for post op visit.  Ok to remove to shower and when otherwise appropriate.     No dressing needed   Order Comments: OK to shower in 24 hours.  Do not submerge your incision for 2 weeks.  Skin glue will come off on its own over 3 weeks.     Activity as tolerated

## 2020-12-04 NOTE — PLAN OF CARE
Pt resting comfortably.    Call light in reach.    No questions or concerns at this time.     has pt belongings

## 2020-12-04 NOTE — PLAN OF CARE
Patient and spouse state they are ready to be discharged. Instructions and prescription (delivered to bedside) given to patient and family. Both verbalize understanding. Patient tolerating po liquids with no difficulty. Patient states pain is at a tolerable level for them. Anesthesia consent and surgical consent in chart upon patient's discharge from [facility].

## 2020-12-04 NOTE — ANESTHESIA PROCEDURE NOTES
Intubation  Performed by: Petr Nye CRNA  Authorized by: Vick Alexis MD     Intubation:     Induction:  Intravenous    Intubated:  Postinduction    Mask Ventilation:  Easy mask    Attempts:  1    Attempted By:  Student    Method of Intubation:  Other (see comments) (airQ LMA size 3.5)    Difficult Airway Encountered?: No      Complications:  None    Airway Device Size:  3.5 (LMA)    Placement Verified By:  Capnometry    Complicating Factors:  None    Findings Post-Intubation:  BS equal bilateral

## 2020-12-04 NOTE — DISCHARGE INSTRUCTIONS
POSTOPERATIVE INSTRUCTIONS FOLLOWING BIOPSY OR LUMPECTOMY    The following are post-operative instructions that will help you to recover from your surgery.  Please read over these instructions carefully and contact us if we can answer any of your questions or concerns.    Dressing/breast binder (surgi-bra)  A surgical bra may be placed around your chest after your surgery.  If you are given the bra, please wear it as close to 24 hours a day as possible until your post-operative clinic appointment.  If the elastic around the bra irritates your skin, you may wear a soft t-shirt underneath the bra.  You may go without wearing the bra long enough to bath, to launder and dry the bra.  If the bra is extremely uncomfortable, you may wear a supportive sports bra instead after 2 days.  You may shower after immediately.  Do not take a tub bath and do not soak the surgical site. Please do not remove the dermabond (glue) that is overlying the incision.    Activity   You should be able to return to your regular activities 2 days after your surgery.  However, do not engage in strenuous activities in which you use your upper body such as:  golf, tennis, aerobics, washing windows, raking the yard, mopping, vacuuming, heavy lifting (e.g children) until you are seen for your follow-up appointment in clinic.    Medication for pain  You may find that over the counter pain medications may be sufficient for your pain.  You will be given a prescription for pain medication for more severe pain.  You should not drive or operate machinery while taking these.  Please take narcotics with food.  Narcotics can cause, or worse, constipation.  You will need to increase your fluid intake, eat high fiber foods (such as fruits and bran) and make sure that you are up and walking. You may need to take an over the counter stool softener for constipation.    Please report the following:   Temperature greater than 101 degrees   Discharge or bad odor  from the wound   Excessive bleeding, such as bloody dressing or extreme bruising   Redness at incision and/or drain sites   Swelling or buildup of fluid around incision     Additional information  I will see you approximately 2 weeks following your surgery.  If this follow-up appointment has not been made, please call the office.    If you have any questions or problems, please call my office or my nurse.    Presley Carballo MD  [phone]  After hours and on weekends, you may call the main [facility] line at [phone] and ask to have the general surgery resident paged or have me paged.

## 2020-12-04 NOTE — ANESTHESIA POSTPROCEDURE EVALUATION
Anesthesia Post Evaluation    Patient: Maribel Valderrama    Procedure(s) Performed: Procedure(s) (LRB):  LUMPECTOMY, BREAST, RIGHT WITH RADIOGRAPHIC MARKER LOCALIZATION (Right)    Final Anesthesia Type: general    Patient location during evaluation: PACU  Patient participation: Yes- Able to Participate  Level of consciousness: awake  Post-procedure vital signs: reviewed and stable  Pain management: adequate  Airway patency: patent    PONV status at discharge: No PONV  Anesthetic complications: no      Cardiovascular status: blood pressure returned to baseline  Respiratory status: unassisted  Hydration status: euvolemic  Follow-up not needed.          Vitals Value Taken Time   /90 12/04/20 1217   Temp 36.8 °C (98.2 °F) 12/04/20 1215   Pulse 65 12/04/20 1219   Resp 23 12/04/20 1219   SpO2 100 % 12/04/20 1219   Vitals shown include unvalidated device data.      No case tracking events are documented in the log.      Pain/Mouna Score: Pain Rating Prior to Med Admin: 4 (12/4/2020 12:13 PM)

## 2020-12-04 NOTE — INTERVAL H&P NOTE
The patient has been examined and the H&P has been reviewed:    I concur with the findings and no changes have occurred since H&P was written.    Surgery risks, benefits and alternative options discussed and understood by patient/family.          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

## 2020-12-04 NOTE — OP NOTE
Date of procedure - 12/04/2020  Preoperative diagnosis - right breast atypia  Postop diagnosis - same  Procedure - radar reflector localized right breast excisional biopsy  Surgeon - Haris Ayala  Anesthesia - general  Blood loss - minimal  Indications - 35-year-old patient underwent a core biopsy of a mammographic abnormality just at the lateral edge of the areola of the right breast which demonstrated atypia  Excision is indicated to rule out coexisting carcinoma  Operative report in detail - patient brought the operating placed in supine position prepped and draped in sterile fashion after satisfactory general anesthesia was induced  A curvilinear incision was made on the lateral edge of the areola of the right breast  Skin subcutaneous tissues were divided and cautery was utilized to circumferentially excise the breast tissue in the anterior portion of the breast at the edge of the areola corresponding to the localization achieved with the DELMY  device  The specimen was removed from the operative field the central location of the clip and radar reflector were confirmed by probing with the DELMY  and then again the by radiographic confirmation  Margins were identified in the 6 standard colors used and the oximeter pathology department  Again the specimen was radiographed to confirm the central location of the clip and DELMY   Wound was evaluated for hemostasis which was considered excellent  The overlying soft tissues were closed in 2 layers of absorbable suture

## 2020-12-14 LAB
FINAL PATHOLOGIC DIAGNOSIS: NORMAL
GROSS: NORMAL
Lab: NORMAL

## 2020-12-17 ENCOUNTER — OFFICE VISIT (OUTPATIENT)
Dept: SURGERY | Facility: CLINIC | Age: 35
End: 2020-12-17
Payer: COMMERCIAL

## 2020-12-17 VITALS
SYSTOLIC BLOOD PRESSURE: 140 MMHG | WEIGHT: 173.94 LBS | DIASTOLIC BLOOD PRESSURE: 68 MMHG | TEMPERATURE: 97 F | BODY MASS INDEX: 27.3 KG/M2 | HEIGHT: 67 IN | HEART RATE: 91 BPM

## 2020-12-17 DIAGNOSIS — N60.81 FLAT EPITHELIAL ATYPIA (FEA) OF RIGHT BREAST: Primary | ICD-10-CM

## 2020-12-17 PROCEDURE — 99999 PR PBB SHADOW E&M-EST. PATIENT-LVL III: CPT | Mod: PBBFAC,,, | Performed by: SURGERY

## 2020-12-17 PROCEDURE — 99024 POSTOP FOLLOW-UP VISIT: CPT | Mod: S$GLB,,, | Performed by: SURGERY

## 2020-12-17 PROCEDURE — 3008F BODY MASS INDEX DOCD: CPT | Mod: CPTII,S$GLB,, | Performed by: SURGERY

## 2020-12-17 PROCEDURE — 1126F AMNT PAIN NOTED NONE PRSNT: CPT | Mod: S$GLB,,, | Performed by: SURGERY

## 2020-12-17 PROCEDURE — 1126F PR PAIN SEVERITY QUANTIFIED, NO PAIN PRESENT: ICD-10-PCS | Mod: S$GLB,,, | Performed by: SURGERY

## 2020-12-17 PROCEDURE — 99024 PR POST-OP FOLLOW-UP VISIT: ICD-10-PCS | Mod: S$GLB,,, | Performed by: SURGERY

## 2020-12-17 PROCEDURE — 99999 PR PBB SHADOW E&M-EST. PATIENT-LVL III: ICD-10-PCS | Mod: PBBFAC,,, | Performed by: SURGERY

## 2020-12-17 PROCEDURE — 3008F PR BODY MASS INDEX (BMI) DOCUMENTED: ICD-10-PCS | Mod: CPTII,S$GLB,, | Performed by: SURGERY

## 2020-12-17 NOTE — PROGRESS NOTES
S/p right breast excisional biopsy for atypia  Well healed  No problems  Very pleased with her care  Will ask medical Oncology to see to discuss chemoprevention

## 2020-12-21 ENCOUNTER — TELEPHONE (OUTPATIENT)
Dept: SURGERY | Facility: CLINIC | Age: 35
End: 2020-12-21

## 2020-12-21 NOTE — TELEPHONE ENCOUNTER
Contacted the patient regarding the message below.  The patient stated that's he will contact myself back regarding this matter.  My name and direct number given to the patient.      ----- Message from Corinne E Coniglio, RN sent at 12/18/2020  9:08 AM CST -----  Patient needs chemoprevention appt for atypia per Dr. Fuhrman Corinne

## 2020-12-30 ENCOUNTER — TELEPHONE (OUTPATIENT)
Dept: SURGERY | Facility: CLINIC | Age: 35
End: 2020-12-30

## 2020-12-30 NOTE — TELEPHONE ENCOUNTER
Returned patient call regarding the message below.  The patient is scheduled to see  on Tuesday 1/12/2021, 11 am at [facility].  The patient voiced understanding of appointment date, time,a dn location.  Reminder letter mailed to the patient.      ----- Message from Darcy Campos RN sent at 12/30/2020  2:32 PM CST -----  Regarding: FW: Pt is requesting a call back today regarding scheduling an [id number]  I think you may have called this patient.  Thanks  ----- Message -----  From: Ghada Hearn  Sent: 12/30/2020   9:11 AM CST  To: [facility]AKTA Staff  Subject: Pt is requesting a call back today regarding#    Appointment Access Request:    PT is requesting a call back today regarding scheduling an appt. Pt stated someone called her but she needing to check her calendar prior to scheduling.    Pt can be reached at [phone]

## 2021-01-12 ENCOUNTER — OFFICE VISIT (OUTPATIENT)
Dept: HEMATOLOGY/ONCOLOGY | Facility: CLINIC | Age: 36
End: 2021-01-12
Payer: COMMERCIAL

## 2021-01-12 DIAGNOSIS — N63.10 BREAST MASS, RIGHT: Primary | ICD-10-CM

## 2021-01-12 PROCEDURE — 99204 PR OFFICE/OUTPT VISIT, NEW, LEVL IV, 45-59 MIN: ICD-10-PCS | Mod: 95,,, | Performed by: INTERNAL MEDICINE

## 2021-01-12 PROCEDURE — 99204 OFFICE O/P NEW MOD 45 MIN: CPT | Mod: 95,,, | Performed by: INTERNAL MEDICINE

## 2021-07-03 ENCOUNTER — HOSPITAL ENCOUNTER (EMERGENCY)
Facility: HOSPITAL | Age: 36
Discharge: HOME | End: 2021-07-03
Attending: EMERGENCY MEDICINE
Payer: COMMERCIAL

## 2021-07-03 VITALS
DIASTOLIC BLOOD PRESSURE: 73 MMHG | WEIGHT: 160 LBS | RESPIRATION RATE: 18 BRPM | BODY MASS INDEX: 25.11 KG/M2 | HEART RATE: 99 BPM | TEMPERATURE: 98 F | HEIGHT: 67 IN | SYSTOLIC BLOOD PRESSURE: 119 MMHG | OXYGEN SATURATION: 100 %

## 2021-07-03 DIAGNOSIS — R31.9 URINARY TRACT INFECTION WITH HEMATURIA, SITE UNSPECIFIED: Primary | ICD-10-CM

## 2021-07-03 DIAGNOSIS — R05.9 COUGH: ICD-10-CM

## 2021-07-03 DIAGNOSIS — R00.0 TACHYCARDIA: ICD-10-CM

## 2021-07-03 DIAGNOSIS — N39.0 URINARY TRACT INFECTION WITH HEMATURIA, SITE UNSPECIFIED: Primary | ICD-10-CM

## 2021-07-03 DIAGNOSIS — R50.9 FEVER, UNSPECIFIED FEVER CAUSE: ICD-10-CM

## 2021-07-03 LAB
ALBUMIN SERPL BCP-MCNC: 3.6 G/DL (ref 3.5–5.2)
ALP SERPL-CCNC: 69 U/L (ref 55–135)
ALT SERPL W/O P-5'-P-CCNC: 65 U/L (ref 10–44)
ANION GAP SERPL CALC-SCNC: 7 MMOL/L (ref 8–16)
AST SERPL-CCNC: 47 U/L (ref 10–40)
B-HCG UR QL: NEGATIVE
BACTERIA #/AREA URNS HPF: ABNORMAL /HPF
BASOPHILS # BLD AUTO: 0.01 K/UL (ref 0–0.2)
BASOPHILS NFR BLD: 0.2 % (ref 0–1.9)
BILIRUB SERPL-MCNC: 0.2 MG/DL (ref 0.1–1)
BILIRUB UR QL STRIP: NEGATIVE
BUN SERPL-MCNC: 12 MG/DL (ref 6–20)
CALCIUM SERPL-MCNC: 8.8 MG/DL (ref 8.7–10.5)
CHLORIDE SERPL-SCNC: 105 MMOL/L (ref 95–110)
CLARITY UR: CLEAR
CO2 SERPL-SCNC: 23 MMOL/L (ref 23–29)
COLOR UR: YELLOW
CREAT SERPL-MCNC: 0.8 MG/DL (ref 0.5–1.4)
CTP QC/QA: YES
D DIMER PPP IA.FEU-MCNC: 2.17 MG/L FEU
DIFFERENTIAL METHOD: ABNORMAL
EOSINOPHIL # BLD AUTO: 0 K/UL (ref 0–0.5)
EOSINOPHIL NFR BLD: 0.2 % (ref 0–8)
ERYTHROCYTE [DISTWIDTH] IN BLOOD BY AUTOMATED COUNT: 12.3 % (ref 11.5–14.5)
EST. GFR  (AFRICAN AMERICAN): >60 ML/MIN/1.73 M^2
EST. GFR  (NON AFRICAN AMERICAN): >60 ML/MIN/1.73 M^2
GLUCOSE SERPL-MCNC: 94 MG/DL (ref 70–110)
GLUCOSE UR QL STRIP: NEGATIVE
HCT VFR BLD AUTO: 39.2 % (ref 37–48.5)
HGB BLD-MCNC: 13.1 G/DL (ref 12–16)
HGB UR QL STRIP: ABNORMAL
HYALINE CASTS #/AREA URNS LPF: 1 /LPF
IMM GRANULOCYTES # BLD AUTO: 0.02 K/UL (ref 0–0.04)
IMM GRANULOCYTES NFR BLD AUTO: 0.5 % (ref 0–0.5)
KETONES UR QL STRIP: NEGATIVE
LACTATE SERPL-SCNC: 0.9 MMOL/L (ref 0.5–2.2)
LEUKOCYTE ESTERASE UR QL STRIP: ABNORMAL
LYMPHOCYTES # BLD AUTO: 0.8 K/UL (ref 1–4.8)
LYMPHOCYTES NFR BLD: 18.9 % (ref 18–48)
MCH RBC QN AUTO: 30.9 PG (ref 27–31)
MCHC RBC AUTO-ENTMCNC: 33.4 G/DL (ref 32–36)
MCV RBC AUTO: 93 FL (ref 82–98)
MICROSCOPIC COMMENT: ABNORMAL
MONOCYTES # BLD AUTO: 0.4 K/UL (ref 0.3–1)
MONOCYTES NFR BLD: 9.2 % (ref 4–15)
NEUTROPHILS # BLD AUTO: 3 K/UL (ref 1.8–7.7)
NEUTROPHILS NFR BLD: 71 % (ref 38–73)
NITRITE UR QL STRIP: NEGATIVE
NRBC BLD-RTO: 0 /100 WBC
PH UR STRIP: 6 [PH] (ref 5–8)
PLATELET # BLD AUTO: 233 K/UL (ref 150–450)
PMV BLD AUTO: 8.9 FL (ref 9.2–12.9)
POC MOLECULAR INFLUENZA A AGN: NEGATIVE
POC MOLECULAR INFLUENZA B AGN: NEGATIVE
POTASSIUM SERPL-SCNC: 4 MMOL/L (ref 3.5–5.1)
PROCALCITONIN SERPL IA-MCNC: 0.13 NG/ML
PROT SERPL-MCNC: 7.9 G/DL (ref 6–8.4)
PROT UR QL STRIP: ABNORMAL
RBC # BLD AUTO: 4.24 M/UL (ref 4–5.4)
RBC #/AREA URNS HPF: 6 /HPF (ref 0–4)
SARS-COV-2 RDRP RESP QL NAA+PROBE: NEGATIVE
SODIUM SERPL-SCNC: 135 MMOL/L (ref 136–145)
SP GR UR STRIP: >1.03 (ref 1–1.03)
SQUAMOUS #/AREA URNS HPF: 6 /HPF
TROPONIN I SERPL DL<=0.01 NG/ML-MCNC: <0.006 NG/ML (ref 0–0.03)
URN SPEC COLLECT METH UR: ABNORMAL
UROBILINOGEN UR STRIP-ACNC: NEGATIVE EU/DL
WBC # BLD AUTO: 4.24 K/UL (ref 3.9–12.7)
WBC #/AREA URNS HPF: 8 /HPF (ref 0–5)

## 2021-07-03 PROCEDURE — 99285 EMERGENCY DEPT VISIT HI MDM: CPT | Mod: 25

## 2021-07-03 PROCEDURE — 93010 ELECTROCARDIOGRAM REPORT: CPT | Mod: ,,, | Performed by: INTERNAL MEDICINE

## 2021-07-03 PROCEDURE — 84145 PROCALCITONIN (PCT): CPT | Performed by: NURSE PRACTITIONER

## 2021-07-03 PROCEDURE — 25500020 PHARM REV CODE 255: Performed by: NURSE PRACTITIONER

## 2021-07-03 PROCEDURE — 96360 HYDRATION IV INFUSION INIT: CPT | Mod: 59

## 2021-07-03 PROCEDURE — 25000003 PHARM REV CODE 250: Performed by: NURSE PRACTITIONER

## 2021-07-03 PROCEDURE — 93010 EKG 12-LEAD: ICD-10-PCS | Mod: ,,, | Performed by: INTERNAL MEDICINE

## 2021-07-03 PROCEDURE — 87502 INFLUENZA DNA AMP PROBE: CPT

## 2021-07-03 PROCEDURE — 81025 URINE PREGNANCY TEST: CPT | Performed by: EMERGENCY MEDICINE

## 2021-07-03 PROCEDURE — 93005 ELECTROCARDIOGRAM TRACING: CPT

## 2021-07-03 PROCEDURE — 81000 URINALYSIS NONAUTO W/SCOPE: CPT | Performed by: NURSE PRACTITIONER

## 2021-07-03 PROCEDURE — 84484 ASSAY OF TROPONIN QUANT: CPT | Performed by: NURSE PRACTITIONER

## 2021-07-03 PROCEDURE — 85025 COMPLETE CBC W/AUTO DIFF WBC: CPT | Performed by: NURSE PRACTITIONER

## 2021-07-03 PROCEDURE — U0002 COVID-19 LAB TEST NON-CDC: HCPCS | Performed by: NURSE PRACTITIONER

## 2021-07-03 PROCEDURE — 83605 ASSAY OF LACTIC ACID: CPT | Performed by: NURSE PRACTITIONER

## 2021-07-03 PROCEDURE — 80053 COMPREHEN METABOLIC PANEL: CPT | Performed by: NURSE PRACTITIONER

## 2021-07-03 PROCEDURE — 85379 FIBRIN DEGRADATION QUANT: CPT | Performed by: NURSE PRACTITIONER

## 2021-07-03 PROCEDURE — 96361 HYDRATE IV INFUSION ADD-ON: CPT

## 2021-07-03 PROCEDURE — 87040 BLOOD CULTURE FOR BACTERIA: CPT | Mod: 59 | Performed by: NURSE PRACTITIONER

## 2021-07-03 RX ORDER — ACETAMINOPHEN 500 MG
1000 TABLET ORAL
Status: COMPLETED | OUTPATIENT
Start: 2021-07-03 | End: 2021-07-03

## 2021-07-03 RX ORDER — CEPHALEXIN 500 MG/1
500 CAPSULE ORAL EVERY 12 HOURS
Qty: 14 CAPSULE | Refills: 0 | Status: SHIPPED | OUTPATIENT
Start: 2021-07-03 | End: 2021-07-10

## 2021-07-03 RX ADMIN — IOHEXOL 75 ML: 350 INJECTION, SOLUTION INTRAVENOUS at 12:07

## 2021-07-03 RX ADMIN — ACETAMINOPHEN 1000 MG: 500 TABLET, FILM COATED ORAL at 09:07

## 2021-07-03 RX ADMIN — SODIUM CHLORIDE 2178 ML: 0.9 INJECTION, SOLUTION INTRAVENOUS at 10:07

## 2021-07-06 ENCOUNTER — TELEPHONE (OUTPATIENT)
Dept: FAMILY MEDICINE | Facility: CLINIC | Age: 36
End: 2021-07-06

## 2021-07-07 ENCOUNTER — OFFICE VISIT (OUTPATIENT)
Dept: FAMILY MEDICINE | Facility: CLINIC | Age: 36
End: 2021-07-07
Payer: COMMERCIAL

## 2021-07-07 ENCOUNTER — CLINICAL SUPPORT (OUTPATIENT)
Dept: FAMILY MEDICINE | Facility: CLINIC | Age: 36
End: 2021-07-07
Payer: COMMERCIAL

## 2021-07-07 ENCOUNTER — LAB VISIT (OUTPATIENT)
Dept: LAB | Facility: HOSPITAL | Age: 36
End: 2021-07-07
Attending: FAMILY MEDICINE
Payer: COMMERCIAL

## 2021-07-07 VITALS
HEART RATE: 101 BPM | OXYGEN SATURATION: 99 % | DIASTOLIC BLOOD PRESSURE: 86 MMHG | WEIGHT: 182.56 LBS | HEIGHT: 67 IN | BODY MASS INDEX: 28.65 KG/M2 | SYSTOLIC BLOOD PRESSURE: 124 MMHG | RESPIRATION RATE: 16 BRPM | TEMPERATURE: 99 F

## 2021-07-07 DIAGNOSIS — R30.0 DYSURIA: ICD-10-CM

## 2021-07-07 DIAGNOSIS — R05.9 COUGH: ICD-10-CM

## 2021-07-07 DIAGNOSIS — R30.0 DYSURIA: Primary | ICD-10-CM

## 2021-07-07 LAB
BACTERIA #/AREA URNS AUTO: NORMAL /HPF
BILIRUB UR QL STRIP: NEGATIVE
CLARITY UR REFRACT.AUTO: CLEAR
COLOR UR AUTO: YELLOW
GLUCOSE UR QL STRIP: NEGATIVE
HGB UR QL STRIP: ABNORMAL
KETONES UR QL STRIP: NEGATIVE
LEUKOCYTE ESTERASE UR QL STRIP: NEGATIVE
MICROSCOPIC COMMENT: NORMAL
NITRITE UR QL STRIP: NEGATIVE
PH UR STRIP: 5 [PH] (ref 5–8)
PROT UR QL STRIP: NEGATIVE
RBC #/AREA URNS AUTO: 1 /HPF (ref 0–4)
SP GR UR STRIP: 1.01 (ref 1–1.03)
SQUAMOUS #/AREA URNS AUTO: 1 /HPF
URN SPEC COLLECT METH UR: ABNORMAL
WBC #/AREA URNS AUTO: 1 /HPF (ref 0–5)

## 2021-07-07 PROCEDURE — 3008F BODY MASS INDEX DOCD: CPT | Mod: CPTII,S$GLB,, | Performed by: FAMILY MEDICINE

## 2021-07-07 PROCEDURE — 3008F PR BODY MASS INDEX (BMI) DOCUMENTED: ICD-10-PCS | Mod: CPTII,S$GLB,, | Performed by: FAMILY MEDICINE

## 2021-07-07 PROCEDURE — 81001 URINALYSIS AUTO W/SCOPE: CPT | Performed by: FAMILY MEDICINE

## 2021-07-07 PROCEDURE — 99999 PR PBB SHADOW E&M-EST. PATIENT-LVL III: ICD-10-PCS | Mod: PBBFAC,,, | Performed by: FAMILY MEDICINE

## 2021-07-07 PROCEDURE — 1126F PR PAIN SEVERITY QUANTIFIED, NO PAIN PRESENT: ICD-10-PCS | Mod: S$GLB,,, | Performed by: FAMILY MEDICINE

## 2021-07-07 PROCEDURE — 1126F AMNT PAIN NOTED NONE PRSNT: CPT | Mod: S$GLB,,, | Performed by: FAMILY MEDICINE

## 2021-07-07 PROCEDURE — 87086 URINE CULTURE/COLONY COUNT: CPT | Performed by: FAMILY MEDICINE

## 2021-07-07 PROCEDURE — 99214 OFFICE O/P EST MOD 30 MIN: CPT | Mod: S$GLB,,, | Performed by: FAMILY MEDICINE

## 2021-07-07 PROCEDURE — U0005 INFEC AGEN DETEC AMPLI PROBE: HCPCS | Performed by: FAMILY MEDICINE

## 2021-07-07 PROCEDURE — 99999 PR PBB SHADOW E&M-EST. PATIENT-LVL III: CPT | Mod: PBBFAC,,, | Performed by: FAMILY MEDICINE

## 2021-07-07 PROCEDURE — 99214 PR OFFICE/OUTPT VISIT, EST, LEVL IV, 30-39 MIN: ICD-10-PCS | Mod: S$GLB,,, | Performed by: FAMILY MEDICINE

## 2021-07-07 PROCEDURE — U0003 INFECTIOUS AGENT DETECTION BY NUCLEIC ACID (DNA OR RNA); SEVERE ACUTE RESPIRATORY SYNDROME CORONAVIRUS 2 (SARS-COV-2) (CORONAVIRUS DISEASE [COVID-19]), AMPLIFIED PROBE TECHNIQUE, MAKING USE OF HIGH THROUGHPUT TECHNOLOGIES AS DESCRIBED BY CMS-2020-01-R: HCPCS | Performed by: FAMILY MEDICINE

## 2021-07-08 LAB
BACTERIA BLD CULT: NORMAL
BACTERIA BLD CULT: NORMAL
BACTERIA UR CULT: NO GROWTH
SARS-COV-2 RNA RESP QL NAA+PROBE: NOT DETECTED
SARS-COV-2- CYCLE NUMBER: -1

## 2021-07-20 ENCOUNTER — OFFICE VISIT (OUTPATIENT)
Dept: FAMILY MEDICINE | Facility: CLINIC | Age: 36
End: 2021-07-20
Payer: COMMERCIAL

## 2021-07-20 ENCOUNTER — LAB VISIT (OUTPATIENT)
Dept: LAB | Facility: HOSPITAL | Age: 36
End: 2021-07-20
Attending: INTERNAL MEDICINE
Payer: COMMERCIAL

## 2021-07-20 VITALS
SYSTOLIC BLOOD PRESSURE: 110 MMHG | HEIGHT: 67 IN | HEART RATE: 70 BPM | DIASTOLIC BLOOD PRESSURE: 76 MMHG | WEIGHT: 182.19 LBS | BODY MASS INDEX: 28.6 KG/M2 | TEMPERATURE: 98 F | OXYGEN SATURATION: 98 %

## 2021-07-20 DIAGNOSIS — J90 PLEURAL EFFUSION, BILATERAL: ICD-10-CM

## 2021-07-20 DIAGNOSIS — R74.8 ELEVATED LIVER ENZYMES: ICD-10-CM

## 2021-07-20 DIAGNOSIS — M54.9 UPPER BACK PAIN: Primary | ICD-10-CM

## 2021-07-20 DIAGNOSIS — M54.42 CHRONIC BILATERAL LOW BACK PAIN WITH BILATERAL SCIATICA: ICD-10-CM

## 2021-07-20 DIAGNOSIS — R79.89 POSITIVE D DIMER: ICD-10-CM

## 2021-07-20 DIAGNOSIS — M54.41 CHRONIC BILATERAL LOW BACK PAIN WITH BILATERAL SCIATICA: ICD-10-CM

## 2021-07-20 DIAGNOSIS — G89.29 CHRONIC BILATERAL LOW BACK PAIN WITH BILATERAL SCIATICA: ICD-10-CM

## 2021-07-20 LAB
ALBUMIN SERPL BCP-MCNC: 3.8 G/DL (ref 3.5–5.2)
ALP SERPL-CCNC: 81 U/L (ref 55–135)
ALT SERPL W/O P-5'-P-CCNC: 24 U/L (ref 10–44)
ANION GAP SERPL CALC-SCNC: 9 MMOL/L (ref 8–16)
AST SERPL-CCNC: 24 U/L (ref 10–40)
BILIRUB SERPL-MCNC: 0.3 MG/DL (ref 0.1–1)
BUN SERPL-MCNC: 17 MG/DL (ref 6–20)
CALCIUM SERPL-MCNC: 10.1 MG/DL (ref 8.7–10.5)
CHLORIDE SERPL-SCNC: 104 MMOL/L (ref 95–110)
CO2 SERPL-SCNC: 25 MMOL/L (ref 23–29)
CREAT SERPL-MCNC: 0.8 MG/DL (ref 0.5–1.4)
EST. GFR  (AFRICAN AMERICAN): >60 ML/MIN/1.73 M^2
EST. GFR  (NON AFRICAN AMERICAN): >60 ML/MIN/1.73 M^2
GLUCOSE SERPL-MCNC: 75 MG/DL (ref 70–110)
POTASSIUM SERPL-SCNC: 4.4 MMOL/L (ref 3.5–5.1)
PROT SERPL-MCNC: 7.9 G/DL (ref 6–8.4)
SODIUM SERPL-SCNC: 138 MMOL/L (ref 136–145)

## 2021-07-20 PROCEDURE — 99999 PR PBB SHADOW E&M-EST. PATIENT-LVL IV: CPT | Mod: PBBFAC,,, | Performed by: INTERNAL MEDICINE

## 2021-07-20 PROCEDURE — 80053 COMPREHEN METABOLIC PANEL: CPT | Performed by: INTERNAL MEDICINE

## 2021-07-20 PROCEDURE — 85379 FIBRIN DEGRADATION QUANT: CPT | Performed by: INTERNAL MEDICINE

## 2021-07-20 PROCEDURE — 3008F PR BODY MASS INDEX (BMI) DOCUMENTED: ICD-10-PCS | Mod: CPTII,S$GLB,, | Performed by: INTERNAL MEDICINE

## 2021-07-20 PROCEDURE — 3008F BODY MASS INDEX DOCD: CPT | Mod: CPTII,S$GLB,, | Performed by: INTERNAL MEDICINE

## 2021-07-20 PROCEDURE — 36415 COLL VENOUS BLD VENIPUNCTURE: CPT | Mod: PO | Performed by: INTERNAL MEDICINE

## 2021-07-20 PROCEDURE — 1126F AMNT PAIN NOTED NONE PRSNT: CPT | Mod: CPTII,S$GLB,, | Performed by: INTERNAL MEDICINE

## 2021-07-20 PROCEDURE — 99214 PR OFFICE/OUTPT VISIT, EST, LEVL IV, 30-39 MIN: ICD-10-PCS | Mod: S$GLB,,, | Performed by: INTERNAL MEDICINE

## 2021-07-20 PROCEDURE — 1126F PR PAIN SEVERITY QUANTIFIED, NO PAIN PRESENT: ICD-10-PCS | Mod: CPTII,S$GLB,, | Performed by: INTERNAL MEDICINE

## 2021-07-20 PROCEDURE — 99214 OFFICE O/P EST MOD 30 MIN: CPT | Mod: S$GLB,,, | Performed by: INTERNAL MEDICINE

## 2021-07-20 PROCEDURE — 99999 PR PBB SHADOW E&M-EST. PATIENT-LVL IV: ICD-10-PCS | Mod: PBBFAC,,, | Performed by: INTERNAL MEDICINE

## 2021-07-21 LAB — D DIMER PPP IA.FEU-MCNC: 0.83 MG/L FEU

## 2021-08-03 ENCOUNTER — TELEPHONE (OUTPATIENT)
Dept: FAMILY MEDICINE | Facility: CLINIC | Age: 36
End: 2021-08-03

## 2021-08-26 ENCOUNTER — OFFICE VISIT (OUTPATIENT)
Dept: OBSTETRICS AND GYNECOLOGY | Facility: CLINIC | Age: 36
End: 2021-08-26
Payer: COMMERCIAL

## 2021-08-26 VITALS
HEART RATE: 90 BPM | DIASTOLIC BLOOD PRESSURE: 72 MMHG | HEIGHT: 67 IN | WEIGHT: 181.69 LBS | BODY MASS INDEX: 28.52 KG/M2 | SYSTOLIC BLOOD PRESSURE: 115 MMHG

## 2021-08-26 DIAGNOSIS — Z01.419 WELL WOMAN EXAM WITH ROUTINE GYNECOLOGICAL EXAM: Primary | ICD-10-CM

## 2021-08-26 DIAGNOSIS — Z12.4 SCREENING FOR CERVICAL CANCER: ICD-10-CM

## 2021-08-26 PROCEDURE — 88175 CYTOPATH C/V AUTO FLUID REDO: CPT | Performed by: OBSTETRICS & GYNECOLOGY

## 2021-08-26 PROCEDURE — 1126F AMNT PAIN NOTED NONE PRSNT: CPT | Mod: CPTII,S$GLB,, | Performed by: OBSTETRICS & GYNECOLOGY

## 2021-08-26 PROCEDURE — 1160F PR REVIEW ALL MEDS BY PRESCRIBER/CLIN PHARMACIST DOCUMENTED: ICD-10-PCS | Mod: CPTII,S$GLB,, | Performed by: OBSTETRICS & GYNECOLOGY

## 2021-08-26 PROCEDURE — 99999 PR PBB SHADOW E&M-EST. PATIENT-LVL III: ICD-10-PCS | Mod: PBBFAC,,, | Performed by: OBSTETRICS & GYNECOLOGY

## 2021-08-26 PROCEDURE — 3008F BODY MASS INDEX DOCD: CPT | Mod: CPTII,S$GLB,, | Performed by: OBSTETRICS & GYNECOLOGY

## 2021-08-26 PROCEDURE — 3074F SYST BP LT 130 MM HG: CPT | Mod: CPTII,S$GLB,, | Performed by: OBSTETRICS & GYNECOLOGY

## 2021-08-26 PROCEDURE — 99395 PREV VISIT EST AGE 18-39: CPT | Mod: S$GLB,,, | Performed by: OBSTETRICS & GYNECOLOGY

## 2021-08-26 PROCEDURE — 99395 PR PREVENTIVE VISIT,EST,18-39: ICD-10-PCS | Mod: S$GLB,,, | Performed by: OBSTETRICS & GYNECOLOGY

## 2021-08-26 PROCEDURE — 1160F RVW MEDS BY RX/DR IN RCRD: CPT | Mod: CPTII,S$GLB,, | Performed by: OBSTETRICS & GYNECOLOGY

## 2021-08-26 PROCEDURE — 3078F DIAST BP <80 MM HG: CPT | Mod: CPTII,S$GLB,, | Performed by: OBSTETRICS & GYNECOLOGY

## 2021-08-26 PROCEDURE — 3078F PR MOST RECENT DIASTOLIC BLOOD PRESSURE < 80 MM HG: ICD-10-PCS | Mod: CPTII,S$GLB,, | Performed by: OBSTETRICS & GYNECOLOGY

## 2021-08-26 PROCEDURE — 3008F PR BODY MASS INDEX (BMI) DOCUMENTED: ICD-10-PCS | Mod: CPTII,S$GLB,, | Performed by: OBSTETRICS & GYNECOLOGY

## 2021-08-26 PROCEDURE — 1159F PR MEDICATION LIST DOCUMENTED IN MEDICAL RECORD: ICD-10-PCS | Mod: CPTII,S$GLB,, | Performed by: OBSTETRICS & GYNECOLOGY

## 2021-08-26 PROCEDURE — 3074F PR MOST RECENT SYSTOLIC BLOOD PRESSURE < 130 MM HG: ICD-10-PCS | Mod: CPTII,S$GLB,, | Performed by: OBSTETRICS & GYNECOLOGY

## 2021-08-26 PROCEDURE — 1159F MED LIST DOCD IN RCRD: CPT | Mod: CPTII,S$GLB,, | Performed by: OBSTETRICS & GYNECOLOGY

## 2021-08-26 PROCEDURE — 99999 PR PBB SHADOW E&M-EST. PATIENT-LVL III: CPT | Mod: PBBFAC,,, | Performed by: OBSTETRICS & GYNECOLOGY

## 2021-08-26 PROCEDURE — 87624 HPV HI-RISK TYP POOLED RSLT: CPT | Performed by: OBSTETRICS & GYNECOLOGY

## 2021-08-26 PROCEDURE — 1126F PR PAIN SEVERITY QUANTIFIED, NO PAIN PRESENT: ICD-10-PCS | Mod: CPTII,S$GLB,, | Performed by: OBSTETRICS & GYNECOLOGY

## 2021-10-22 ENCOUNTER — OFFICE VISIT (OUTPATIENT)
Dept: FAMILY MEDICINE | Facility: CLINIC | Age: 36
End: 2021-10-22
Payer: COMMERCIAL

## 2021-10-22 VITALS
SYSTOLIC BLOOD PRESSURE: 124 MMHG | OXYGEN SATURATION: 99 % | TEMPERATURE: 99 F | DIASTOLIC BLOOD PRESSURE: 82 MMHG | HEIGHT: 67 IN | HEART RATE: 98 BPM | BODY MASS INDEX: 28.52 KG/M2 | WEIGHT: 181.69 LBS

## 2021-10-22 DIAGNOSIS — N60.99 ATYPICAL DUCTAL HYPERPLASIA OF BREAST: ICD-10-CM

## 2021-10-22 DIAGNOSIS — F41.9 ANXIETY: Primary | ICD-10-CM

## 2021-10-22 PROCEDURE — 3074F PR MOST RECENT SYSTOLIC BLOOD PRESSURE < 130 MM HG: ICD-10-PCS | Mod: CPTII,S$GLB,, | Performed by: INTERNAL MEDICINE

## 2021-10-22 PROCEDURE — 3074F SYST BP LT 130 MM HG: CPT | Mod: CPTII,S$GLB,, | Performed by: INTERNAL MEDICINE

## 2021-10-22 PROCEDURE — 1160F RVW MEDS BY RX/DR IN RCRD: CPT | Mod: CPTII,S$GLB,, | Performed by: INTERNAL MEDICINE

## 2021-10-22 PROCEDURE — 99215 OFFICE O/P EST HI 40 MIN: CPT | Mod: S$GLB,,, | Performed by: INTERNAL MEDICINE

## 2021-10-22 PROCEDURE — 99999 PR PBB SHADOW E&M-EST. PATIENT-LVL IV: ICD-10-PCS | Mod: PBBFAC,,, | Performed by: INTERNAL MEDICINE

## 2021-10-22 PROCEDURE — 3008F BODY MASS INDEX DOCD: CPT | Mod: CPTII,S$GLB,, | Performed by: INTERNAL MEDICINE

## 2021-10-22 PROCEDURE — 3008F PR BODY MASS INDEX (BMI) DOCUMENTED: ICD-10-PCS | Mod: CPTII,S$GLB,, | Performed by: INTERNAL MEDICINE

## 2021-10-22 PROCEDURE — 3079F PR MOST RECENT DIASTOLIC BLOOD PRESSURE 80-89 MM HG: ICD-10-PCS | Mod: CPTII,S$GLB,, | Performed by: INTERNAL MEDICINE

## 2021-10-22 PROCEDURE — 1159F MED LIST DOCD IN RCRD: CPT | Mod: CPTII,S$GLB,, | Performed by: INTERNAL MEDICINE

## 2021-10-22 PROCEDURE — 3079F DIAST BP 80-89 MM HG: CPT | Mod: CPTII,S$GLB,, | Performed by: INTERNAL MEDICINE

## 2021-10-22 PROCEDURE — 1160F PR REVIEW ALL MEDS BY PRESCRIBER/CLIN PHARMACIST DOCUMENTED: ICD-10-PCS | Mod: CPTII,S$GLB,, | Performed by: INTERNAL MEDICINE

## 2021-10-22 PROCEDURE — 99999 PR PBB SHADOW E&M-EST. PATIENT-LVL IV: CPT | Mod: PBBFAC,,, | Performed by: INTERNAL MEDICINE

## 2021-10-22 PROCEDURE — 1159F PR MEDICATION LIST DOCUMENTED IN MEDICAL RECORD: ICD-10-PCS | Mod: CPTII,S$GLB,, | Performed by: INTERNAL MEDICINE

## 2021-10-22 PROCEDURE — 99215 PR OFFICE/OUTPT VISIT, EST, LEVL V, 40-54 MIN: ICD-10-PCS | Mod: S$GLB,,, | Performed by: INTERNAL MEDICINE

## 2021-10-22 RX ORDER — SERTRALINE HYDROCHLORIDE 50 MG/1
TABLET, FILM COATED ORAL
Qty: 60 TABLET | Refills: 0 | Status: SHIPPED | OUTPATIENT
Start: 2021-10-22 | End: 2021-11-22

## 2021-11-01 ENCOUNTER — IMMUNIZATION (OUTPATIENT)
Dept: INTERNAL MEDICINE | Facility: CLINIC | Age: 36
End: 2021-11-01
Payer: COMMERCIAL

## 2021-11-01 DIAGNOSIS — Z23 NEED FOR VACCINATION: Primary | ICD-10-CM

## 2021-11-01 PROCEDURE — 0001A COVID-19, MRNA, LNP-S, PF, 30 MCG/0.3 ML DOSE VACCINE: CPT | Mod: PBBFAC | Performed by: INTERNAL MEDICINE

## 2021-11-22 ENCOUNTER — HOSPITAL ENCOUNTER (OUTPATIENT)
Dept: RADIOLOGY | Facility: HOSPITAL | Age: 36
Discharge: HOME | End: 2021-11-22
Attending: INTERNAL MEDICINE
Payer: COMMERCIAL

## 2021-11-22 ENCOUNTER — OFFICE VISIT (OUTPATIENT)
Dept: FAMILY MEDICINE | Facility: CLINIC | Age: 36
End: 2021-11-22
Payer: COMMERCIAL

## 2021-11-22 DIAGNOSIS — N60.99 ATYPICAL DUCTAL HYPERPLASIA OF BREAST: ICD-10-CM

## 2021-11-22 DIAGNOSIS — F41.9 ANXIETY: Primary | ICD-10-CM

## 2021-11-22 PROCEDURE — 99213 OFFICE O/P EST LOW 20 MIN: CPT | Mod: 95,,, | Performed by: INTERNAL MEDICINE

## 2021-11-22 PROCEDURE — 77062 BREAST TOMOSYNTHESIS BI: CPT | Mod: TC

## 2021-11-22 PROCEDURE — 77062 BREAST TOMOSYNTHESIS BI: CPT | Mod: 26,,, | Performed by: RADIOLOGY

## 2021-11-22 PROCEDURE — 77066 MAMMO DIGITAL DIAGNOSTIC BILAT WITH TOMO: ICD-10-PCS | Mod: 26,,, | Performed by: RADIOLOGY

## 2021-11-22 PROCEDURE — 77062 MAMMO DIGITAL DIAGNOSTIC BILAT WITH TOMO: ICD-10-PCS | Mod: 26,,, | Performed by: RADIOLOGY

## 2021-11-22 PROCEDURE — 77066 DX MAMMO INCL CAD BI: CPT | Mod: 26,,, | Performed by: RADIOLOGY

## 2021-11-22 PROCEDURE — 99213 PR OFFICE/OUTPT VISIT, EST, LEVL III, 20-29 MIN: ICD-10-PCS | Mod: 95,,, | Performed by: INTERNAL MEDICINE

## 2021-11-23 NOTE — LETTER
April 25, 2017      Alyssia Chavarria MD  [address]  [address]  [place] [address]           [facility]  [address]  [place] [address]  Phone: [phone]          Patient: Maribel Valderrama   MR Number: [id number]   YOB: 1985   Date of Visit: 4/25/2017       Dear Dr. Alyssia Chavarria:    Thank you for referring Maribel Valderrama to me for evaluation. Attached you will find relevant portions of my assessment and plan of care.    If you have questions, please do not hesitate to call me. I look forward to following Maribel Valderrama along with you.    Sincerely,    Deejay De Paz MD    Enclosure  CC:  No Recipients    If you would like to receive this communication electronically, please contact [email or web address] or [phone] to request more information on [employer] access.    For providers and/or their staff who would like to refer a patient to [facility], please contact us through our one-stop-shop provider referral line, [facility], at [phone].    If you feel you have received this communication in error or would no longer like to receive these types of communications, please e-mail [email or web address]          Detail Level: Generalized Detail Level: Zone Detail Level: Detailed Detail Level: Simple

## 2021-12-09 ENCOUNTER — IMMUNIZATION (OUTPATIENT)
Dept: OBSTETRICS AND GYNECOLOGY | Facility: CLINIC | Age: 36
End: 2021-12-09
Payer: COMMERCIAL

## 2021-12-09 DIAGNOSIS — Z23 NEED FOR VACCINATION: Primary | ICD-10-CM

## 2021-12-09 PROCEDURE — 91300 COVID-19, MRNA, LNP-S, PF, 30 MCG/0.3 ML DOSE VACCINE: CPT | Mod: PBBFAC | Performed by: FAMILY MEDICINE

## 2021-12-09 PROCEDURE — 0002A COVID-19, MRNA, LNP-S, PF, 30 MCG/0.3 ML DOSE VACCINE: CPT | Mod: PBBFAC | Performed by: FAMILY MEDICINE

## 2022-05-23 ENCOUNTER — OFFICE VISIT (OUTPATIENT)
Dept: FAMILY MEDICINE | Facility: CLINIC | Age: 37
End: 2022-05-23
Payer: COMMERCIAL

## 2022-05-23 ENCOUNTER — PATIENT MESSAGE (OUTPATIENT)
Dept: FAMILY MEDICINE | Facility: CLINIC | Age: 37
End: 2022-05-23

## 2022-05-23 VITALS
TEMPERATURE: 98 F | BODY MASS INDEX: 28.18 KG/M2 | OXYGEN SATURATION: 97 % | HEIGHT: 67 IN | SYSTOLIC BLOOD PRESSURE: 122 MMHG | HEART RATE: 69 BPM | WEIGHT: 179.56 LBS | DIASTOLIC BLOOD PRESSURE: 78 MMHG

## 2022-05-23 DIAGNOSIS — F51.04 PSYCHOPHYSIOLOGICAL INSOMNIA: ICD-10-CM

## 2022-05-23 DIAGNOSIS — N60.99 ATYPICAL DUCTAL HYPERPLASIA OF BREAST: ICD-10-CM

## 2022-05-23 DIAGNOSIS — Z00.00 ROUTINE ADULT HEALTH MAINTENANCE: Primary | ICD-10-CM

## 2022-05-23 DIAGNOSIS — Z12.39 BREAST CANCER SCREENING, HIGH RISK PATIENT: ICD-10-CM

## 2022-05-23 PROCEDURE — 99999 PR PBB SHADOW E&M-EST. PATIENT-LVL IV: CPT | Mod: PBBFAC,,, | Performed by: INTERNAL MEDICINE

## 2022-05-23 PROCEDURE — 3074F PR MOST RECENT SYSTOLIC BLOOD PRESSURE < 130 MM HG: ICD-10-PCS | Mod: CPTII,S$GLB,, | Performed by: INTERNAL MEDICINE

## 2022-05-23 PROCEDURE — 99999 PR PBB SHADOW E&M-EST. PATIENT-LVL IV: ICD-10-PCS | Mod: PBBFAC,,, | Performed by: INTERNAL MEDICINE

## 2022-05-23 PROCEDURE — 99395 PREV VISIT EST AGE 18-39: CPT | Mod: S$GLB,,, | Performed by: INTERNAL MEDICINE

## 2022-05-23 PROCEDURE — 1159F MED LIST DOCD IN RCRD: CPT | Mod: CPTII,S$GLB,, | Performed by: INTERNAL MEDICINE

## 2022-05-23 PROCEDURE — 3078F DIAST BP <80 MM HG: CPT | Mod: CPTII,S$GLB,, | Performed by: INTERNAL MEDICINE

## 2022-05-23 PROCEDURE — 3078F PR MOST RECENT DIASTOLIC BLOOD PRESSURE < 80 MM HG: ICD-10-PCS | Mod: CPTII,S$GLB,, | Performed by: INTERNAL MEDICINE

## 2022-05-23 PROCEDURE — 99395 PR PREVENTIVE VISIT,EST,18-39: ICD-10-PCS | Mod: S$GLB,,, | Performed by: INTERNAL MEDICINE

## 2022-05-23 PROCEDURE — 1159F PR MEDICATION LIST DOCUMENTED IN MEDICAL RECORD: ICD-10-PCS | Mod: CPTII,S$GLB,, | Performed by: INTERNAL MEDICINE

## 2022-05-23 PROCEDURE — 3008F BODY MASS INDEX DOCD: CPT | Mod: CPTII,S$GLB,, | Performed by: INTERNAL MEDICINE

## 2022-05-23 PROCEDURE — 3008F PR BODY MASS INDEX (BMI) DOCUMENTED: ICD-10-PCS | Mod: CPTII,S$GLB,, | Performed by: INTERNAL MEDICINE

## 2022-05-23 PROCEDURE — 3074F SYST BP LT 130 MM HG: CPT | Mod: CPTII,S$GLB,, | Performed by: INTERNAL MEDICINE

## 2022-05-23 RX ORDER — MIRTAZAPINE 7.5 MG/1
7.5 TABLET, FILM COATED ORAL NIGHTLY
Qty: 30 TABLET | Refills: 0 | Status: SHIPPED | OUTPATIENT
Start: 2022-05-23 | End: 2023-03-09

## 2022-05-23 RX ORDER — FLUTICASONE PROPIONATE 50 MCG
2 SPRAY, SUSPENSION (ML) NASAL DAILY
COMMUNITY
Start: 2022-02-03 | End: 2023-03-09

## 2022-05-23 RX ORDER — CEPHALEXIN 500 MG/1
500 CAPSULE ORAL 2 TIMES DAILY
COMMUNITY
Start: 2022-03-10 | End: 2023-03-09

## 2022-05-23 NOTE — PROGRESS NOTES
Subjective:       Chief Complaint  Chief Complaint   Patient presents with    Anxiety    Breast Cancer Screening       HPI  Maribel Valderrama is a 37 y.o. female with multiple medical diagnoses as listed in the medical history and problem list that presents for above complaint(s).    Work stress/anxiety - working in ICU predominantly   Sleep currently 5-6 hours a night - she can't turn her mind off at times  Still hesitant about medications for symptoms - she has a good home and family life currently and experiences no stress or anxiety at home    Patient Care Team:  Mauricio Hickman MD as PCP - General (Internal Medicine)  Clau Land LPN as Care Coordinator      PAST MEDICAL HISTORY:  Past Medical History:   Diagnosis Date    Atypical hyperplasia of breast 2020    right    Chronic constipation        PAST SURGICAL HISTORY:  Past Surgical History:   Procedure Laterality Date    BREAST LUMPECTOMY Right     COLONOSCOPY N/A 10/23/2018    Procedure: COLONOSCOPY;  Surgeon: Natalia Rocha MD;  Location: [address]);  Service: Endoscopy;  Laterality: N/A;  constipation prep    Da Kory-assisted resection of complex left ovarian cyst x2 Left 06/22/2016    DONNA done at time of surgery -Dermoid and Hemorrhagic ovarian cyst    Ovarian Teratoma Removal Right 4/5/2010    15 weeks gestation, DERMOID RIGHT        SOCIAL HISTORY:  Social History     Socioeconomic History    Marital status:      Spouse name: Roxanne    Number of children: 1   Occupational History    Occupation: Registered Nurse     Comment: [employer]   Tobacco Use    Smoking status: Never Smoker    Smokeless tobacco: Never Used   Substance and Sexual Activity    Alcohol use: No    Drug use: No    Sexual activity: Yes     Partners: Male     Birth control/protection: None     Comment:  to roxanne since 2009       FAMILY HISTORY:  Family History   Problem Relation Age of Onset    Hypertension Mother      "Breast cancer Mother     Hypertension Father     Breast cancer Other     Cancer Other         cervical cancer    Breast cancer Other     Diabetes Maternal Uncle     Ovarian cancer Neg Hx     Stroke Neg Hx     Colon cancer Neg Hx     Celiac disease Neg Hx     Cirrhosis Neg Hx     Colon polyps Neg Hx     Crohn's disease Neg Hx     Esophageal cancer Neg Hx     Inflammatory bowel disease Neg Hx     Liver cancer Neg Hx     Liver disease Neg Hx     Rectal cancer Neg Hx     Stomach cancer Neg Hx     Ulcerative colitis Neg Hx        ALLERGIES AND MEDICATIONS: updated and reviewed.  Review of patient's allergies indicates:   Allergen Reactions    Sulfamethoxazole-trimethoprim Other (See Comments)     Current Outpatient Medications   Medication Sig Dispense Refill    cephALEXin (KEFLEX) 500 MG capsule Take 500 mg by mouth 2 (two) times daily.      fluticasone propionate (FLONASE) 50 mcg/actuation nasal spray 2 sprays by Each Nostril route once daily.       No current facility-administered medications for this visit.             Objective:       Physical Exam  Vitals:    05/23/22 0834   BP: 122/78   BP Location: Right arm   Patient Position: Sitting   BP Method: Large (Manual)   Pulse: 69   Temp: 98 °F (36.7 °C)   TempSrc: Oral   SpO2: 97%   Weight: 81.4 kg (179 lb 9 oz)   Height: 5' 7" (1.702 m)    Body mass index is 28.12 kg/m².  Weight: 81.4 kg (179 lb 9 oz)   Height: 5' 7" (170.2 cm)   Physical Exam  Constitutional:       General: She is not in acute distress.     Appearance: She is well-developed. She is not diaphoretic.   HENT:      Head: Normocephalic and atraumatic.   Pulmonary:      Effort: Pulmonary effort is normal.   Neurological:      Mental Status: She is alert.   Psychiatric:         Behavior: Behavior normal.             Assessment:     1. Routine adult health maintenance    2. Psychophysiological insomnia    3. Atypical ductal hyperplasia of breast    4. Breast cancer screening, high risk " patient      Plan:     Maribel was seen today for anxiety.    Diagnoses and all orders for this visit:    CPE  Discussed healthy diet, regular exercise, necessary labs, age appropriate cancer screening, and routine vaccinations.      Atypical ductal hyperplasia of breast  Lifetime risk of breast cancer over 20% - per Medical Oncology recommendation needs MRI recommended interval every 6 months  will order and have follow-up with Breast clinic as well    Anxiety  Insomnia, psychophysiological  Managing anxiety through non-pharmacological means currently  We will utilize mirtazapine currently for comorbid symptoms of insomnia      F/u in 3 months     Mauricio Hickman MD  Internal Medicine-Pediatrics          Health Maintenance       Date Due Completion Date    Influenza Vaccine (1) 09/01/2021 11/1/2017    COVID-19 Vaccine (2 - Pfizer 2-dose series) 11/22/2021 11/1/2021    Cervical Cancer Screening 08/26/2024 8/26/2021    TETANUS VACCINE 07/25/2027 7/25/2017          The patient expressed understanding and no barriers to adherence were identified.     1. The patient indicates understanding of these issues and agrees with the plan. Brief care plan is updated and reviewed with the patient as applicable.     2. The patient is given an After Visit Summary that lists all medications with directions, allergies, orders placed during this encounter and follow-up instructions.     3. I have reviewed the patient's medical information including past medical, family, and social history sections including the medications and allergies.     4. We discussed the patient's current medications. I reconciled the patient's medication list and prepared and supplied needed refills.       Mauricio Hickman MD    Internal Medicine-Pediatrics

## 2022-06-17 ENCOUNTER — HOSPITAL ENCOUNTER (OUTPATIENT)
Dept: RADIOLOGY | Facility: HOSPITAL | Age: 37
Discharge: HOME | End: 2022-06-17
Attending: INTERNAL MEDICINE
Payer: COMMERCIAL

## 2022-06-17 DIAGNOSIS — Z12.39 BREAST CANCER SCREENING, HIGH RISK PATIENT: ICD-10-CM

## 2022-06-17 DIAGNOSIS — N60.99 ATYPICAL DUCTAL HYPERPLASIA OF BREAST: ICD-10-CM

## 2022-06-17 PROCEDURE — 77067 SCR MAMMO BI INCL CAD: CPT | Mod: TC

## 2022-06-17 PROCEDURE — 77063 MAMMO DIGITAL SCREENING BILAT WITH TOMO: ICD-10-PCS | Mod: 26,,, | Performed by: RADIOLOGY

## 2022-06-17 PROCEDURE — 77063 BREAST TOMOSYNTHESIS BI: CPT | Mod: 26,,, | Performed by: RADIOLOGY

## 2022-06-17 PROCEDURE — 77067 MAMMO DIGITAL SCREENING BILAT WITH TOMO: ICD-10-PCS | Mod: 26,,, | Performed by: RADIOLOGY

## 2022-06-17 PROCEDURE — 77063 BREAST TOMOSYNTHESIS BI: CPT | Mod: TC

## 2022-06-17 PROCEDURE — 77067 SCR MAMMO BI INCL CAD: CPT | Mod: 26,,, | Performed by: RADIOLOGY

## 2022-09-29 ENCOUNTER — HOSPITAL ENCOUNTER (OUTPATIENT)
Dept: RADIOLOGY | Facility: HOSPITAL | Age: 37
Discharge: HOME | End: 2022-09-29
Attending: PODIATRIST
Payer: COMMERCIAL

## 2022-09-29 ENCOUNTER — OFFICE VISIT (OUTPATIENT)
Dept: PODIATRY | Facility: CLINIC | Age: 37
End: 2022-09-29
Payer: COMMERCIAL

## 2022-09-29 VITALS
WEIGHT: 181.88 LBS | HEART RATE: 85 BPM | BODY MASS INDEX: 28.55 KG/M2 | HEIGHT: 67 IN | SYSTOLIC BLOOD PRESSURE: 119 MMHG | DIASTOLIC BLOOD PRESSURE: 83 MMHG

## 2022-09-29 DIAGNOSIS — M21.70 LOWER LIMB LENGTH DIFFERENCE: ICD-10-CM

## 2022-09-29 DIAGNOSIS — M79.671 FOOT PAIN, BILATERAL: Primary | ICD-10-CM

## 2022-09-29 DIAGNOSIS — M79.671 FOOT PAIN, BILATERAL: ICD-10-CM

## 2022-09-29 DIAGNOSIS — M79.672 FOOT PAIN, BILATERAL: ICD-10-CM

## 2022-09-29 DIAGNOSIS — M79.672 FOOT PAIN, BILATERAL: Primary | ICD-10-CM

## 2022-09-29 PROCEDURE — 99999 PR PBB SHADOW E&M-EST. PATIENT-LVL III: ICD-10-PCS | Mod: PBBFAC,,, | Performed by: PODIATRIST

## 2022-09-29 PROCEDURE — 1159F PR MEDICATION LIST DOCUMENTED IN MEDICAL RECORD: ICD-10-PCS | Mod: CPTII,S$GLB,, | Performed by: PODIATRIST

## 2022-09-29 PROCEDURE — 3074F SYST BP LT 130 MM HG: CPT | Mod: CPTII,S$GLB,, | Performed by: PODIATRIST

## 2022-09-29 PROCEDURE — 1159F MED LIST DOCD IN RCRD: CPT | Mod: CPTII,S$GLB,, | Performed by: PODIATRIST

## 2022-09-29 PROCEDURE — 73630 XR FOOT COMPLETE 3 VIEW BILATERAL: ICD-10-PCS | Mod: 26,,, | Performed by: RADIOLOGY

## 2022-09-29 PROCEDURE — 73630 X-RAY EXAM OF FOOT: CPT | Mod: TC,50,PN

## 2022-09-29 PROCEDURE — 73630 X-RAY EXAM OF FOOT: CPT | Mod: 26,,, | Performed by: RADIOLOGY

## 2022-09-29 PROCEDURE — 3079F DIAST BP 80-89 MM HG: CPT | Mod: CPTII,S$GLB,, | Performed by: PODIATRIST

## 2022-09-29 PROCEDURE — 99203 PR OFFICE/OUTPT VISIT, NEW, LEVL III, 30-44 MIN: ICD-10-PCS | Mod: S$GLB,,, | Performed by: PODIATRIST

## 2022-09-29 PROCEDURE — 3079F PR MOST RECENT DIASTOLIC BLOOD PRESSURE 80-89 MM HG: ICD-10-PCS | Mod: CPTII,S$GLB,, | Performed by: PODIATRIST

## 2022-09-29 PROCEDURE — 3008F BODY MASS INDEX DOCD: CPT | Mod: CPTII,S$GLB,, | Performed by: PODIATRIST

## 2022-09-29 PROCEDURE — 3074F PR MOST RECENT SYSTOLIC BLOOD PRESSURE < 130 MM HG: ICD-10-PCS | Mod: CPTII,S$GLB,, | Performed by: PODIATRIST

## 2022-09-29 PROCEDURE — 3008F PR BODY MASS INDEX (BMI) DOCUMENTED: ICD-10-PCS | Mod: CPTII,S$GLB,, | Performed by: PODIATRIST

## 2022-09-29 PROCEDURE — 99203 OFFICE O/P NEW LOW 30 MIN: CPT | Mod: S$GLB,,, | Performed by: PODIATRIST

## 2022-09-29 PROCEDURE — 99999 PR PBB SHADOW E&M-EST. PATIENT-LVL III: CPT | Mod: PBBFAC,,, | Performed by: PODIATRIST

## 2022-09-29 NOTE — PROGRESS NOTES
Subjective:      Patient ID: Maribel Valderrama is a 37 y.o. female.    Chief Complaint:   Foot Pain (Leandro foot/ostly left foot)    Maribel is a 37 y.o. female who presents to the podiatry clinic  with complaint of  bilateral foot pain.  Right more than left. Onset of the symptoms was several months ago.   Patient relates she is had occasional foot pain on and off however the last few months it has been worse.  She relates that it is mostly on the outside of her right foot.  Usually occurs after prolonged standing at work.  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    Patient relates she takes Advil or Motrin helps.  She also use some green rubbing alcohol that helped  Denies injury  Mild swelling    Occasional arch pain in both feet  Occasional hip and back pain nothing significant  Patient use have inserts in her shoes a long time ago nothing current      Past Medical History:   Diagnosis Date    Atypical hyperplasia of breast 2020    right    Chronic constipation      Past Surgical History:   Procedure Laterality Date    BREAST LUMPECTOMY Right 12/04/2021    COLONOSCOPY N/A 10/23/2018    Procedure: COLONOSCOPY;  Surgeon: Natalia Rocha MD;  Location: [facility] ([address] [place] [address]);  Service: Endoscopy;  Laterality: N/A;  constipation prep    Da Kory-assisted resection of complex left ovarian cyst x2 Left 06/22/2016    DONNA done at time of surgery -Dermoid and Hemorrhagic ovarian cyst    Ovarian Teratoma Removal Right 04/05/2010    15 weeks gestation, DERMOID RIGHT      Current Outpatient Medications on File Prior to Visit   Medication Sig Dispense Refill    cephALEXin (KEFLEX) 500 MG capsule Take 500 mg by mouth 2 (two) times daily.      fluticasone propionate (FLONASE) 50 mcg/actuation nasal spray 2 sprays by Each Nostril route once daily.      mirtazapine (REMERON) 7.5 MG Tab Take 1 tablet (7.5 mg total) by mouth every evening. (Patient not taking: Reported on 9/29/2022) 30  "tablet 0     No current facility-administered medications on file prior to visit.     Review of patient's allergies indicates:   Allergen Reactions    Sulfamethoxazole-trimethoprim Other (See Comments)       Review of Systems   Constitutional: Negative for chills, decreased appetite, fever, malaise/fatigue, night sweats, weight gain and weight loss.   Cardiovascular:  Negative for chest pain, claudication, dyspnea on exertion, leg swelling, palpitations and syncope.   Respiratory:  Negative for cough and shortness of breath.    Endocrine: Negative for cold intolerance and heat intolerance.   Hematologic/Lymphatic: Negative for bleeding problem. Does not bruise/bleed easily.   Skin:  Negative for color change, dry skin, flushing, itching, nail changes, poor wound healing, rash, skin cancer, suspicious lesions and unusual hair distribution.   Musculoskeletal:  Negative for arthritis, back pain, falls, gout, joint pain, joint swelling, muscle cramps, muscle weakness, myalgias, neck pain and stiffness.        Foot pain right more than left   Gastrointestinal:  Negative for diarrhea, nausea and vomiting.   Neurological:  Negative for dizziness, focal weakness, light-headedness, numbness, paresthesias, tremors, vertigo and weakness.   Psychiatric/Behavioral:  Negative for altered mental status and depression. The patient does not have insomnia.    Allergic/Immunologic: Negative.          Objective:       Vitals:    09/29/22 0855   BP: 119/83   Pulse: 85   Weight: 82.5 kg (181 lb 14.1 oz)   Height: 5' 7" (1.702 m)   PainSc:   8   PainLoc: Foot   82.5 kg (181 lb 14.1 oz)     Physical Exam  Vitals reviewed.   Constitutional:       General: She is not in acute distress.     Appearance: She is well-developed. She is not ill-appearing, toxic-appearing or diaphoretic.      Comments: Proper supportive shoegear      Cardiovascular:      Pulses:           Dorsalis pedis pulses are 2+ on the right side and 2+ on the left side.       "  Posterior tibial pulses are 2+ on the right side and 2+ on the left side.   Musculoskeletal:         General: No deformity.      Right lower leg: No edema.      Left lower leg: No edema.      Right ankle: Normal.      Right Achilles Tendon: Normal.      Left ankle: Normal.      Left Achilles Tendon: Normal.      Right foot: Decreased range of motion. Tenderness and bony tenderness present. No deformity.      Left foot: Decreased range of motion. No deformity, tenderness or bony tenderness.      Comments: Left them left shorter than right    No pain with range of motion against resistance strength 5/5 bilateral    Negative vibratory pain    Right 5th base positive pain on palpation  No pain along course of peroneal tendon  No pain on range of motion  No pain ankle  No pain left     Feet:      Right foot:      Protective Sensation: 10 sites tested.  10 sites sensed.      Skin integrity: No ulcer, blister, skin breakdown, erythema, warmth, callus or dry skin.      Toenail Condition: Right toenails are normal.      Left foot:      Protective Sensation: 10 sites tested.  10 sites sensed.      Skin integrity: No ulcer, blister, skin breakdown, erythema, warmth, callus or dry skin.      Toenail Condition: Left toenails are normal.      Comments: No signs of infection or open lesions  Skin:     General: Skin is warm and dry.      Capillary Refill: Capillary refill takes 2 to 3 seconds.      Coloration: Skin is not pale.      Findings: No erythema or rash.   Neurological:      Mental Status: She is alert and oriented to person, place, and time.      Gait: Gait is intact.   Psychiatric:         Attention and Perception: Attention normal.         Mood and Affect: Mood normal.         Speech: Speech normal.         Behavior: Behavior normal.         Thought Content: Thought content normal.         Cognition and Memory: Cognition normal.         Judgment: Judgment normal.             Assessment:       Encounter Diagnoses   Name  Primary?    Foot pain, bilateral Yes    Lower limb length difference          Plan:       Maribel was seen today for foot pain.    Diagnoses and all orders for this visit:    Foot pain, bilateral  -     X-Ray Foot Complete Bilateral; Future    Lower limb length difference    I counseled the patient on her conditions, their implications and medical management.    Discussed with patient this may be a bursitis from the limb length deformity and mild reducible varus rotation    Also differentials could include peroneal tendon inflammation or tear as well as 5th metatarsal base stress fracture     Will obtain baseline x-rays to rule out fracture    - demonstrate an added heel lift left shoe    - new shoes; recommend patient getting a new balance or other type of sturdy shoe recommend getting fitted at a running store new balance store    - mt pad sleeve dispensed for right foot    Follow-up 1 month will consider MRI and or Cam boot immobilization as well as physical therapy             Follow up in about 4 weeks (around 10/27/2022).

## 2022-12-03 NOTE — LACTATION NOTE
LC did discharge lactation teaching and reviewed the Mother's Breastfeeding Guide. LC answered all questions. Mother has  phone number  for questions after DC.   Mother may refer to the After Visit Summary for lactation instructions. Mother plans to nurse and bottlefeed formula. Mother is aware that this may decrease her supply. Mother plans to get a pump on the way home for extra breast stimulation. Mother gave a bottle with a nipple. LC reviewed how this may cause confusion so mother is open to using a cup or spoon. LC stressed need for breast stimulation 8 or more times a day.  
LC visit to the room. Mother plans to call LC for next nursing so LC can assess latch. LC left contact number. RN told LC that mother plans to breastfeed and bottlefeed. Education done by RN on the risk of formula feeding. Mother aware to nurse on cue until content, 8 or more times in 24 hours. Mother will ensure deep latch and observe for signs of milk transfer. Mom will monitor baby's 24 hour diaper count. Mom will record feedings and output in the 24 hour in her Mother's Breastfeeding Guide. Mom will call lactation nurse if she has any questions or concerns. LC left name on white board.      
Spontaneous, unlabored and symmetrical

## 2023-01-01 NOTE — TELEPHONE ENCOUNTER
Informed pt that of her results and the prescription sent in to her pharmacy. Pt communicated understanding.    
Left message requesting a call back at [phone].     Will inform pt that her urine culture results came back indicating that she has a UTI and a prescription for amoxicillin 250 MG caps has been sent to the [facility]'s on [facility] [address]. Twenty-one capsules have been dispensed and the instructions are to take 1 cap by mouth 3 times daily.  
2023 07:55

## 2023-03-09 ENCOUNTER — OFFICE VISIT (OUTPATIENT)
Dept: OBSTETRICS AND GYNECOLOGY | Facility: CLINIC | Age: 38
End: 2023-03-09
Payer: COMMERCIAL

## 2023-03-09 VITALS
SYSTOLIC BLOOD PRESSURE: 108 MMHG | HEIGHT: 67 IN | DIASTOLIC BLOOD PRESSURE: 80 MMHG | BODY MASS INDEX: 28.96 KG/M2 | WEIGHT: 184.5 LBS

## 2023-03-09 DIAGNOSIS — Z12.31 BREAST CANCER SCREENING BY MAMMOGRAM: ICD-10-CM

## 2023-03-09 DIAGNOSIS — Z91.89 INCREASED RISK OF BREAST CANCER: ICD-10-CM

## 2023-03-09 DIAGNOSIS — N60.99 ATYPICAL DUCTAL HYPERPLASIA OF BREAST: ICD-10-CM

## 2023-03-09 DIAGNOSIS — Z01.419 ENCOUNTER FOR ANNUAL ROUTINE GYNECOLOGICAL EXAMINATION: Primary | ICD-10-CM

## 2023-03-09 DIAGNOSIS — Z30.09 ENCOUNTER FOR OTHER GENERAL COUNSELING OR ADVICE ON CONTRACEPTION: ICD-10-CM

## 2023-03-09 PROBLEM — R00.2 PALPITATIONS: Status: ACTIVE | Noted: 2023-01-20

## 2023-03-09 PROCEDURE — 1159F PR MEDICATION LIST DOCUMENTED IN MEDICAL RECORD: ICD-10-PCS | Mod: CPTII,S$GLB,, | Performed by: OBSTETRICS & GYNECOLOGY

## 2023-03-09 PROCEDURE — 3074F SYST BP LT 130 MM HG: CPT | Mod: CPTII,S$GLB,, | Performed by: OBSTETRICS & GYNECOLOGY

## 2023-03-09 PROCEDURE — 99395 PREV VISIT EST AGE 18-39: CPT | Mod: S$GLB,,, | Performed by: OBSTETRICS & GYNECOLOGY

## 2023-03-09 PROCEDURE — 1160F RVW MEDS BY RX/DR IN RCRD: CPT | Mod: CPTII,S$GLB,, | Performed by: OBSTETRICS & GYNECOLOGY

## 2023-03-09 PROCEDURE — 1160F PR REVIEW ALL MEDS BY PRESCRIBER/CLIN PHARMACIST DOCUMENTED: ICD-10-PCS | Mod: CPTII,S$GLB,, | Performed by: OBSTETRICS & GYNECOLOGY

## 2023-03-09 PROCEDURE — 99999 PR PBB SHADOW E&M-EST. PATIENT-LVL III: ICD-10-PCS | Mod: PBBFAC,,, | Performed by: OBSTETRICS & GYNECOLOGY

## 2023-03-09 PROCEDURE — 3008F BODY MASS INDEX DOCD: CPT | Mod: CPTII,S$GLB,, | Performed by: OBSTETRICS & GYNECOLOGY

## 2023-03-09 PROCEDURE — 3008F PR BODY MASS INDEX (BMI) DOCUMENTED: ICD-10-PCS | Mod: CPTII,S$GLB,, | Performed by: OBSTETRICS & GYNECOLOGY

## 2023-03-09 PROCEDURE — 3074F PR MOST RECENT SYSTOLIC BLOOD PRESSURE < 130 MM HG: ICD-10-PCS | Mod: CPTII,S$GLB,, | Performed by: OBSTETRICS & GYNECOLOGY

## 2023-03-09 PROCEDURE — 3079F DIAST BP 80-89 MM HG: CPT | Mod: CPTII,S$GLB,, | Performed by: OBSTETRICS & GYNECOLOGY

## 2023-03-09 PROCEDURE — 3079F PR MOST RECENT DIASTOLIC BLOOD PRESSURE 80-89 MM HG: ICD-10-PCS | Mod: CPTII,S$GLB,, | Performed by: OBSTETRICS & GYNECOLOGY

## 2023-03-09 PROCEDURE — 1159F MED LIST DOCD IN RCRD: CPT | Mod: CPTII,S$GLB,, | Performed by: OBSTETRICS & GYNECOLOGY

## 2023-03-09 PROCEDURE — 99999 PR PBB SHADOW E&M-EST. PATIENT-LVL III: CPT | Mod: PBBFAC,,, | Performed by: OBSTETRICS & GYNECOLOGY

## 2023-03-09 PROCEDURE — 99395 PR PREVENTIVE VISIT,EST,18-39: ICD-10-PCS | Mod: S$GLB,,, | Performed by: OBSTETRICS & GYNECOLOGY

## 2023-03-09 RX ORDER — TRETINOIN 0.25 MG/G
CREAM TOPICAL NIGHTLY
COMMUNITY
Start: 2023-01-07

## 2023-03-09 NOTE — PATIENT INSTRUCTIONS
Please check out the [employer] College of [employer]st[employer] PATIENT WEBSITE.  The site has education materials, patient stories, expert views, and a portal for you to ask questions.       [email or web address]      As always, please let me know if you have any questions.     Dr. Vivian Montes

## 2023-03-09 NOTE — PROGRESS NOTES
Chief Complaint: Well Woman Exam     HPI:      Maribel Valderrama is a 38 y.o.  who presents today for well woman exam.  LMP: Patient's last menstrual period was 2023 (approximate).  No issues, problems, or complaints. Specifically, patient denies abnormal vaginal bleeding, discharge, pelvic pain, urinary problems, or changes in appetite. Ms. Valderrama is currently sexually active with a single male partner. She is currently using condoms for contraception. She declines STD screening today.    Previous Pap:  no abnormalities (2021)  Previous Mammogram:     Results for orders placed during the hospital encounter of 22    Mammo Digital Screening Bilat w/ Zoltan    Narrative  Result:  Mammo Digital Screening Bilat w/ Zoltan    History:  Patient is 37 y.o. and is seen for a screening mammogram.      Films Compared:  Compared to: 2021 Mammo Digital Diagnostic Bilat with Zoltan (No Change)    Findings:  This procedure was performed using tomosynthesis.  Computer-aided detection was utilized in the interpretation of this examination.    The breasts have scattered areas of fibroglandular density. There is no evidence of suspicious masses, microcalcifications or architectural distortion.    Impression  No mammographic evidence of malignancy.    BI-RADS Category 1: Negative    Recommendation:  Routine Screening mammogram in 1 Year, High Risk    Your estimated lifetime risk of breast cancer (to age 85) based on Tyrer-Cuzick risk assessment model is 36.61 %.  According to the American Cancer Society, patients with a lifetime breast cancer risk of 20% or higher might benefit from supplemental screening tests. ??     Most Recent Dexa: not indicated  Colonoscopy: never had    COVID vaccine: completed series  Gardasil:has never had     Patient Active Problem List   Diagnosis    Gastroesophageal reflux disease    Chronic idiopathic constipation    Constipation    Breast mass, right    Atypical ductal  "hyperplasia of breast    Palpitations       Past Medical History:   Diagnosis Date    Atypical hyperplasia of breast     right    Chronic constipation        Past Surgical History:   Procedure Laterality Date    BREAST LUMPECTOMY Right 2021    COLONOSCOPY N/A 10/23/2018    Procedure: COLONOSCOPY;  Surgeon: Natalia Rocha MD;  Location: [facility] ([address] [place] [address]);  Service: Endoscopy;  Laterality: N/A;  constipation prep    Da Kory-assisted resection of complex left ovarian cyst x2 Left 2016    DONNA done at time of surgery -Dermoid and Hemorrhagic ovarian cyst    Ovarian Teratoma Removal Right 2010    15 weeks gestation, DERMOID RIGHT        OB History          3    Para   2    Term   2            AB   1    Living   2         SAB   1    IAB        Ectopic        Multiple   0    Live Births   2           Obstetric Comments     No abnormal pap  No STDs                 ROS:     Review of Systems   Constitutional:  Negative for activity change, appetite change and fatigue.   Respiratory:  Negative for shortness of breath.    Cardiovascular:  Negative for chest pain.   Gastrointestinal:  Negative for abdominal pain, constipation and diarrhea.   Endocrine: Negative for cold intolerance and heat intolerance.   Genitourinary:  Negative for dysuria, frequency, menstrual irregularity, pelvic pain and vaginal discharge.   Integumentary:  Negative for breast mass, breast discharge and breast tenderness.   Psychiatric/Behavioral:  Negative for dysphoric mood. The patient is not nervous/anxious.    Breast: Negative for mass and tenderness    Physical Exam:      PHYSICAL EXAM:  /80   Ht 5' 7" (1.702 m)   Wt 83.7 kg (184 lb 8.4 oz)   LMP 2023 (Approximate)   BMI 28.90 kg/m²   Body mass index is 28.9 kg/m².     APPEARANCE: Well nourished, well developed, in no acute distress.  PSYCH: Appropriate mood and affect.  SKIN: No acne or hirsutism  NECK: Neck symmetric without masses or " thyromegaly  NODES: No inguinal, axillary, or supraclavicular lymph node enlargement  ABDOMEN: Soft.  No tenderness or masses.    CARDIOVASCULAR: No edema of peripheral extremities  BREASTS: Symmetrical, no skin changes or visible lesions.  No palpable masses or nipple discharge bilaterally.  PELVIC: Normal external genitalia without lesions.  Normal hair distribution.  Adequate perineal body, normal urethral meatus.  Vagina moist and well rugated without lesions or discharge.  Cervix pink, without lesions, discharge or tenderness.  No significant cystocele or rectocele.  Bimanual exam shows uterus to be normal size, regular, mobile and nontender.  Adnexa without masses or tenderness.      Assessment:     1. Encounter for annual routine gynecological examination        2. Encounter for other general counseling or advice on contraception        3. Breast cancer screening by mammogram        4. Increased risk of breast cancer  MRI Breast w/wo Contrast, w/CAD, Bilateral    MRI Breast w/wo Contrast, w/CAD, Bilateral    MRI Breast w/wo Contrast, w/CAD, Bilateral            Plan:     Clinical breast exam performed.  Pap UTD.  Mammogram due 6/2023.  Colonoscopy at 45 or as needed.  Contraception: condoms.  Increased breast cancer risk: Discussed increased breast cancer risk, TC score 36.6%, and Heme/Onc recommendations.  Start annual MRI and MMG.  Patient's mother with negative genetic testing.  Patient to obtain results for review to determine her need for genetic testing.  DEXA at 65 or as needed.  Follow up in about 1 year (around 3/9/2024).    Counseling:     Patient was counseled today on current ASCCP pap guidelines, the recommendation for yearly pelvic exams, healthy diet and exercise routines, breast self awareness and annual mammograms.She is to see her PCP for other health maintenance.     Use of the [employer] Patient Portal discussed and encouraged during today's visit.         Vivian Montes MD  [facility] -  Obstetrics and Gynecology  03/09/2023

## 2023-04-13 ENCOUNTER — HOSPITAL ENCOUNTER (OUTPATIENT)
Dept: RADIOLOGY | Facility: OTHER | Age: 38
Discharge: HOME | End: 2023-04-13
Attending: OBSTETRICS & GYNECOLOGY
Payer: COMMERCIAL

## 2023-04-13 DIAGNOSIS — Z91.89 INCREASED RISK OF BREAST CANCER: ICD-10-CM

## 2023-04-13 PROCEDURE — A9577 INJ MULTIHANCE: HCPCS | Performed by: OBSTETRICS & GYNECOLOGY

## 2023-04-13 PROCEDURE — 25500020 PHARM REV CODE 255: Performed by: OBSTETRICS & GYNECOLOGY

## 2023-04-13 PROCEDURE — 77049 MRI BREAST C-+ W/CAD BI: CPT | Mod: 26,,, | Performed by: RADIOLOGY

## 2023-04-13 PROCEDURE — 77049 MRI BREAST C-+ W/CAD BI: CPT | Mod: TC

## 2023-04-13 PROCEDURE — 77049 MRI BREAST W/WO CONTRAST, W/CAD, BILATERAL: ICD-10-PCS | Mod: 26,,, | Performed by: RADIOLOGY

## 2023-04-13 RX ADMIN — GADOBENATE DIMEGLUMINE 17 ML: 529 INJECTION, SOLUTION INTRAVENOUS at 08:04

## 2023-04-18 DIAGNOSIS — Z91.89 INCREASED RISK OF BREAST CANCER: Primary | ICD-10-CM

## 2023-06-20 ENCOUNTER — HOSPITAL ENCOUNTER (OUTPATIENT)
Dept: RADIOLOGY | Facility: HOSPITAL | Age: 38
Discharge: HOME | End: 2023-06-20
Attending: OBSTETRICS & GYNECOLOGY
Payer: COMMERCIAL

## 2023-06-20 DIAGNOSIS — Z91.89 INCREASED RISK OF BREAST CANCER: ICD-10-CM

## 2023-06-20 PROCEDURE — 77067 MAMMO DIGITAL SCREENING BILAT WITH TOMO: ICD-10-PCS | Mod: 26,,, | Performed by: RADIOLOGY

## 2023-06-20 PROCEDURE — 77067 SCR MAMMO BI INCL CAD: CPT | Mod: TC

## 2023-06-20 PROCEDURE — 77067 SCR MAMMO BI INCL CAD: CPT | Mod: 26,,, | Performed by: RADIOLOGY

## 2023-06-20 PROCEDURE — 77063 MAMMO DIGITAL SCREENING BILAT WITH TOMO: ICD-10-PCS | Mod: 26,,, | Performed by: RADIOLOGY

## 2023-06-20 PROCEDURE — 77063 BREAST TOMOSYNTHESIS BI: CPT | Mod: 26,,, | Performed by: RADIOLOGY

## 2023-07-05 ENCOUNTER — PATIENT MESSAGE (OUTPATIENT)
Dept: FAMILY MEDICINE | Facility: CLINIC | Age: 38
End: 2023-07-05
Payer: COMMERCIAL

## 2023-07-05 DIAGNOSIS — N64.89 BREAST ASYMMETRY: Primary | ICD-10-CM

## 2023-07-12 ENCOUNTER — HOSPITAL ENCOUNTER (OUTPATIENT)
Dept: RADIOLOGY | Facility: HOSPITAL | Age: 38
Discharge: HOME | End: 2023-07-12
Attending: INTERNAL MEDICINE
Payer: COMMERCIAL

## 2023-07-12 DIAGNOSIS — N64.89 BREAST ASYMMETRY: ICD-10-CM

## 2023-07-12 PROCEDURE — 77061 MAMMO DIGITAL DIAGNOSTIC LEFT WITH TOMO: ICD-10-PCS | Mod: 26,LT,, | Performed by: RADIOLOGY

## 2023-07-12 PROCEDURE — 77065 DX MAMMO INCL CAD UNI: CPT | Mod: 26,LT,, | Performed by: RADIOLOGY

## 2023-07-12 PROCEDURE — 77061 BREAST TOMOSYNTHESIS UNI: CPT | Mod: TC,LT

## 2023-07-12 PROCEDURE — 77061 BREAST TOMOSYNTHESIS UNI: CPT | Mod: 26,LT,, | Performed by: RADIOLOGY

## 2023-07-12 PROCEDURE — 76642 ULTRASOUND BREAST LIMITED: CPT | Mod: 26,LT,, | Performed by: RADIOLOGY

## 2023-07-12 PROCEDURE — 77065 MAMMO DIGITAL DIAGNOSTIC LEFT WITH TOMO: ICD-10-PCS | Mod: 26,LT,, | Performed by: RADIOLOGY

## 2023-07-12 PROCEDURE — 76642 US BREAST LEFT LIMITED: ICD-10-PCS | Mod: 26,LT,, | Performed by: RADIOLOGY

## 2023-07-12 PROCEDURE — 76642 ULTRASOUND BREAST LIMITED: CPT | Mod: TC,LT

## 2023-07-13 ENCOUNTER — TELEPHONE (OUTPATIENT)
Dept: FAMILY MEDICINE | Facility: CLINIC | Age: 38
End: 2023-07-13
Payer: COMMERCIAL

## 2023-07-13 NOTE — TELEPHONE ENCOUNTER
----- Message from Mauricio Hickman MD sent at 7/12/2023  8:49 PM CDT -----  Please call the patient regarding the following results:    Mammo was negative for suspicious findings. She is due for her annual exam. Please schedule next available    Best,    Mauricio Hickman MD  Internal Medicine-Pediatrics

## 2023-11-16 ENCOUNTER — TELEPHONE (OUTPATIENT)
Dept: FAMILY MEDICINE | Facility: CLINIC | Age: 38
End: 2023-11-16
Payer: COMMERCIAL

## 2023-11-16 NOTE — TELEPHONE ENCOUNTER
Spoke to patient in reference to her getting a sooner appointment. Appointment made virtually for December 2023.

## 2023-11-16 NOTE — TELEPHONE ENCOUNTER
----- Message from Segundo Galeano sent at 11/16/2023  8:42 AM CST -----  Type:  Sooner Apoointment Request    Caller is requesting a sooner appointment.  Caller declined first available appointment listed below.  Caller will not accept being placed on the waitlist and is requesting a message be sent to doctor.  Name of Caller:pt  When is the first available appointment?04/09/24  Symptoms:Annual   Would the patient rather a call back or a response via [employer]? call  Best Call Back Number: [phone]  Additional Information:

## 2023-12-14 ENCOUNTER — OFFICE VISIT (OUTPATIENT)
Dept: FAMILY MEDICINE | Facility: CLINIC | Age: 38
End: 2023-12-14
Payer: COMMERCIAL

## 2023-12-14 VITALS
HEIGHT: 67 IN | SYSTOLIC BLOOD PRESSURE: 135 MMHG | BODY MASS INDEX: 28.25 KG/M2 | DIASTOLIC BLOOD PRESSURE: 92 MMHG | WEIGHT: 180 LBS | HEART RATE: 100 BPM

## 2023-12-14 DIAGNOSIS — R00.2 PALPITATIONS: Primary | ICD-10-CM

## 2023-12-14 DIAGNOSIS — G89.29 CHRONIC NONINTRACTABLE HEADACHE, UNSPECIFIED HEADACHE TYPE: ICD-10-CM

## 2023-12-14 DIAGNOSIS — N60.99 ATYPICAL DUCTAL HYPERPLASIA OF BREAST: ICD-10-CM

## 2023-12-14 DIAGNOSIS — Z01.89 ENCOUNTER FOR ROUTINE LABORATORY TESTING: ICD-10-CM

## 2023-12-14 DIAGNOSIS — R51.9 CHRONIC NONINTRACTABLE HEADACHE, UNSPECIFIED HEADACHE TYPE: ICD-10-CM

## 2023-12-14 DIAGNOSIS — Z12.39 BREAST CANCER SCREENING, HIGH RISK PATIENT: ICD-10-CM

## 2023-12-14 PROCEDURE — 3075F PR MOST RECENT SYSTOLIC BLOOD PRESS GE 130-139MM HG: ICD-10-PCS | Mod: CPTII,95,, | Performed by: INTERNAL MEDICINE

## 2023-12-14 PROCEDURE — 3008F BODY MASS INDEX DOCD: CPT | Mod: CPTII,95,, | Performed by: INTERNAL MEDICINE

## 2023-12-14 PROCEDURE — 3075F SYST BP GE 130 - 139MM HG: CPT | Mod: CPTII,95,, | Performed by: INTERNAL MEDICINE

## 2023-12-14 PROCEDURE — 1159F PR MEDICATION LIST DOCUMENTED IN MEDICAL RECORD: ICD-10-PCS | Mod: CPTII,95,, | Performed by: INTERNAL MEDICINE

## 2023-12-14 PROCEDURE — 3080F DIAST BP >= 90 MM HG: CPT | Mod: CPTII,95,, | Performed by: INTERNAL MEDICINE

## 2023-12-14 PROCEDURE — 99214 PR OFFICE/OUTPT VISIT, EST, LEVL IV, 30-39 MIN: ICD-10-PCS | Mod: 95,,, | Performed by: INTERNAL MEDICINE

## 2023-12-14 PROCEDURE — 3080F PR MOST RECENT DIASTOLIC BLOOD PRESSURE >= 90 MM HG: ICD-10-PCS | Mod: CPTII,95,, | Performed by: INTERNAL MEDICINE

## 2023-12-14 PROCEDURE — 3008F PR BODY MASS INDEX (BMI) DOCUMENTED: ICD-10-PCS | Mod: CPTII,95,, | Performed by: INTERNAL MEDICINE

## 2023-12-14 PROCEDURE — 99214 OFFICE O/P EST MOD 30 MIN: CPT | Mod: 95,,, | Performed by: INTERNAL MEDICINE

## 2023-12-14 PROCEDURE — 1159F MED LIST DOCD IN RCRD: CPT | Mod: CPTII,95,, | Performed by: INTERNAL MEDICINE

## 2023-12-14 RX ORDER — TRIAMCINOLONE ACETONIDE 1 MG/G
CREAM TOPICAL 2 TIMES DAILY
COMMUNITY
Start: 2023-12-07

## 2023-12-14 NOTE — PROGRESS NOTES
Subjective:     HPI  Maribel Valderrama is a 38 y.o. female with multiple medical diagnoses as listed in the medical history and problem list that presents for above complaint(s).    The patient location is: [place]   Visit type: audiovisual    Face to Face time with patient: 30 minutes  35 minutes of total time spent on the encounter, which includes face to face time and non-face to face time preparing to see the patient (eg, review of tests), Obtaining and/or reviewing separately obtained history, Documenting clinical information in the electronic or other health record, Independently interpreting results (not separately reported) and communicating results to the patient/family/caregiver, or Care coordination (not separately reported).     Each patient to whom he or she provides medical services by telemedicine is:  (1) informed of the relationship between the physician and patient and the respective role of any other health care provider with respect to management of the patient; and (2) notified that he or she may decline to receive medical services by telemedicine and may withdraw from such care at any time.    Swtiched to Case Management in her RN role     BP today was 135/92 - didn't sleep much last night   BP has been 120s/70s  Patient has cardiac event monitor return normal    Having frequent headaches for 2 months - thinks may be related to increased screen time with her new glasses  She is also having some pain in her frontal region, left ear and top o her head  'pressure-like' sensation notably  No congestion or dizziness or cough   Tylenol and rest will alleviate symptoms  She did at one time have headache 'for a week straight'  It seems like she was having visual changes consisting of blurry vision    Patient Care Team:  Mauricio Hickman MD as PCP - General (Internal Medicine)  Clau Land LPN as Care Coordinator      PAST MEDICAL HISTORY:  Past Medical History:   Diagnosis  Date    Atypical hyperplasia of breast 2020    right    Chronic constipation        PAST SURGICAL HISTORY:  Past Surgical History:   Procedure Laterality Date    BREAST LUMPECTOMY Right 12/04/2021    COLONOSCOPY N/A 10/23/2018    Procedure: COLONOSCOPY;  Surgeon: Natalia Rocha MD;  Location: [facility] ([address] [place] [address]);  Service: Endoscopy;  Laterality: N/A;  constipation prep    Da Kory-assisted resection of complex left ovarian cyst x2 Left 06/22/2016    DONNA done at time of surgery -Dermoid and Hemorrhagic ovarian cyst    Ovarian Teratoma Removal Right 04/05/2010    15 weeks gestation, DERMOID RIGHT        SOCIAL HISTORY:  Social History     Socioeconomic History    Marital status:      Spouse name: Roxanne    Number of children: 1   Occupational History    Occupation: Registered Nurse     Comment: [employer]   Tobacco Use    Smoking status: Never    Smokeless tobacco: Never   Substance and Sexual Activity    Alcohol use: No    Drug use: No    Sexual activity: Yes     Partners: Male     Birth control/protection: None     Comment:  to roxanne since 2009     Social Determinants of Health     Financial Resource Strain: Low Risk  (12/14/2023)    Overall Financial Resource Strain (CARDIA)     Difficulty of Paying Living Expenses: Not very hard   Food Insecurity: Unknown (12/14/2023)    Hunger Vital Sign     Worried About Running Out of Food in the Last Year: Patient declined     Ran Out of Food in the Last Year: Never true   Transportation Needs: No Transportation Needs (12/14/2023)    PRAPARE - Transportation     Lack of Transportation (Medical): No     Lack of Transportation (Non-Medical): No   Physical Activity: Sufficiently Active (12/14/2023)    Exercise Vital Sign     Days of Exercise per Week: 5 days     Minutes of Exercise per Session: 90 min   Stress: Stress Concern Present (12/14/2023)    [employer] [place] [employer] - Occupational Stress Questionnaire     Feeling of Stress : Very much    Social Connections: Unknown (12/14/2023)    Social Connection and Isolation Panel [NHANES]     Frequency of Communication with Friends and Family: Three times a week     Frequency of Social Gatherings with Friends and Family: Once a week     Active Member of Clubs or Organizations: No     Attends Club or Organization Meetings: Never     Marital Status:    Housing Stability: Low Risk  (12/14/2023)    Housing Stability Vital Sign     Unable to Pay for Housing in the Last Year: No     Number of Places Lived in the Last Year: 1     Unstable Housing in the Last Year: No       FAMILY HISTORY:  Family History   Problem Relation Age of Onset    Hypertension Mother     Breast cancer Mother 56    Hypertension Father     Hypertension Brother     Hypertension Maternal Grandmother     Hypertension Maternal Grandfather     Hypertension Paternal Grandmother     Diabetes Maternal Uncle     Breast cancer Other     Ovarian cancer Neg Hx     Stroke Neg Hx     Colon cancer Neg Hx     Celiac disease Neg Hx     Cirrhosis Neg Hx     Colon polyps Neg Hx     Crohn's disease Neg Hx     Esophageal cancer Neg Hx     Inflammatory bowel disease Neg Hx     Liver cancer Neg Hx     Liver disease Neg Hx     Rectal cancer Neg Hx     Stomach cancer Neg Hx     Ulcerative colitis Neg Hx        ALLERGIES AND MEDICATIONS: updated and reviewed.  Review of patient's allergies indicates:   Allergen Reactions    Sulfamethoxazole-trimethoprim Other (See Comments)     Current Outpatient Medications   Medication Sig Dispense Refill    tretinoin (RETIN-A) 0.025 % cream Apply topically every evening.      triamcinolone acetonide 0.1% (KENALOG) 0.1 % cream Apply topically 2 (two) times daily.       No current facility-administered medications for this visit.         ROS  Review of Systems   Constitutional:  Negative for activity change and unexpected weight change.   HENT:  Negative for hearing loss, rhinorrhea and trouble swallowing.    Eyes:  Negative  "for discharge and visual disturbance.   Respiratory:  Negative for chest tightness and wheezing.    Cardiovascular:  Negative for chest pain and palpitations.   Gastrointestinal:  Negative for blood in stool, constipation, diarrhea and vomiting.   Endocrine: Negative for polydipsia and polyuria.   Genitourinary:  Negative for difficulty urinating and hematuria.   Musculoskeletal:  Negative for neck pain.   Neurological:  Positive for headaches.   Psychiatric/Behavioral:  Negative for dysphoric mood.          Objective:       Physical Exam  Vitals:    12/14/23 1108   BP: (!) 135/92   Pulse: 100   Weight: 81.6 kg (180 lb)   Height: 5' 7" (1.702 m)    Body mass index is 28.19 kg/m².  Weight: 81.6 kg (180 lb)   Height: 5' 7" (170.2 cm)   Physical Exam  Constitutional:       General: She is not in acute distress.     Appearance: She is well-developed. She is not diaphoretic.   HENT:      Head: Normocephalic and atraumatic.   Eyes:      General: Gaze aligned appropriately.      Extraocular Movements:      Right eye: Normal extraocular motion.      Left eye: Normal extraocular motion.   Pulmonary:      Effort: Pulmonary effort is normal.   Neurological:      Mental Status: She is alert.   Psychiatric:         Attention and Perception: Attention normal.         Mood and Affect: Mood normal.         Speech: Speech normal.         Behavior: Behavior normal.         Cognition and Memory: Cognition normal.             Assessment:     1. Palpitations    2. Atypical ductal hyperplasia of breast    3. Breast cancer screening, high risk patient    4. Chronic nonintractable headache, unspecified headache type    5. Encounter for routine laboratory testing      Plan:     Maribel was seen today for annual exam.    Diagnoses and all orders for this visit:    Palpitations  Patient amenable to completion of prior workup as noted   -     Echo; Future    Atypical ductal hyperplasia of breast  Breast cancer screening, high risk " patient  Essentially benign prior breast bx, she does qualify for yearly MRI given lifetime breast cancer risk calculated to be greater than 20%  -     MRI Breast w/wo Contrast, w/CAD, Bilateral; Future    Chronic nonintractable headache, unspecified headache type  Referral placed for longstanding symptoms  Defers pharmacologic treatments other than OTC remedies currently  -     Ambulatory referral/consult to Neurology; Future    Encounter for routine laboratory testing  -     CBC Auto Differential; Future  -     Comprehensive Metabolic Panel; Future  -     Lipid Panel; Future          Health Maintenance         Date Due Completion Date    Hemoglobin A1c (Diabetic Prevention Screening) 03/03/2020 2/3/2017    Influenza Vaccine (1) 09/01/2023 11/1/2017    COVID-19 Vaccine (3 - 2023-24 season) 09/01/2023 12/9/2021    Cervical Cancer Screening 08/26/2026 8/26/2021    TETANUS VACCINE 07/25/2027 7/25/2017          F/u in 3-6 months    The patient expressed understanding and no barriers to adherence were identified.     1. The patient indicates understanding of these issues and agrees with the plan. Brief care plan is updated and reviewed with the patient as applicable.   2. The patient is given an After Visit Summary that lists all medications with directions, allergies, orders placed during this encounter and follow-up instructions.   3. I have reviewed the patient's medical information including past medical, family, and social history sections including the medications and allergies.   4. We discussed the patient's current medications. I reconciled the patient's medication list and prepared and supplied needed refills.     This note was partially created using Clicker Voice Recognition software. Typographical and content errors may occur with this process. While efforts are made to detect and correct such errors, in some cases errors will persist. For this reason, wording in this document should be considered in the  proper context and not strictly verbatim.      Mauricio Hickman MD    Internal Medicine-Pediatrics

## 2023-12-15 ENCOUNTER — TELEPHONE (OUTPATIENT)
Dept: FAMILY MEDICINE | Facility: CLINIC | Age: 38
End: 2023-12-15
Payer: COMMERCIAL

## 2023-12-15 ENCOUNTER — LAB VISIT (OUTPATIENT)
Dept: LAB | Facility: HOSPITAL | Age: 38
End: 2023-12-15
Attending: INTERNAL MEDICINE
Payer: COMMERCIAL

## 2023-12-15 DIAGNOSIS — Z01.89 ENCOUNTER FOR ROUTINE LABORATORY TESTING: ICD-10-CM

## 2023-12-15 LAB
ALBUMIN SERPL BCP-MCNC: 3.8 G/DL (ref 3.5–5.2)
ALP SERPL-CCNC: 60 U/L (ref 55–135)
ALT SERPL W/O P-5'-P-CCNC: 17 U/L (ref 10–44)
ANION GAP SERPL CALC-SCNC: 10 MMOL/L (ref 8–16)
AST SERPL-CCNC: 17 U/L (ref 10–40)
BASOPHILS # BLD AUTO: 0.02 K/UL (ref 0–0.2)
BASOPHILS NFR BLD: 0.4 % (ref 0–1.9)
BILIRUB SERPL-MCNC: 0.5 MG/DL (ref 0.1–1)
BUN SERPL-MCNC: 14 MG/DL (ref 6–20)
CALCIUM SERPL-MCNC: 9 MG/DL (ref 8.7–10.5)
CHLORIDE SERPL-SCNC: 105 MMOL/L (ref 95–110)
CHOLEST SERPL-MCNC: 194 MG/DL (ref 120–199)
CHOLEST/HDLC SERPL: 4.1 {RATIO} (ref 2–5)
CO2 SERPL-SCNC: 24 MMOL/L (ref 23–29)
CREAT SERPL-MCNC: 0.8 MG/DL (ref 0.5–1.4)
DIFFERENTIAL METHOD: ABNORMAL
EOSINOPHIL # BLD AUTO: 0.1 K/UL (ref 0–0.5)
EOSINOPHIL NFR BLD: 2.4 % (ref 0–8)
ERYTHROCYTE [DISTWIDTH] IN BLOOD BY AUTOMATED COUNT: 12.1 % (ref 11.5–14.5)
EST. GFR  (NO RACE VARIABLE): >60 ML/MIN/1.73 M^2
GLUCOSE SERPL-MCNC: 92 MG/DL (ref 70–110)
HCT VFR BLD AUTO: 40.7 % (ref 37–48.5)
HDLC SERPL-MCNC: 47 MG/DL (ref 40–75)
HDLC SERPL: 24.2 % (ref 20–50)
HGB BLD-MCNC: 13.2 G/DL (ref 12–16)
IMM GRANULOCYTES # BLD AUTO: 0.01 K/UL (ref 0–0.04)
IMM GRANULOCYTES NFR BLD AUTO: 0.2 % (ref 0–0.5)
LDLC SERPL CALC-MCNC: 137.4 MG/DL (ref 63–159)
LYMPHOCYTES # BLD AUTO: 1.7 K/UL (ref 1–4.8)
LYMPHOCYTES NFR BLD: 37.4 % (ref 18–48)
MCH RBC QN AUTO: 30.6 PG (ref 27–31)
MCHC RBC AUTO-ENTMCNC: 32.4 G/DL (ref 32–36)
MCV RBC AUTO: 94 FL (ref 82–98)
MONOCYTES # BLD AUTO: 0.4 K/UL (ref 0.3–1)
MONOCYTES NFR BLD: 8.6 % (ref 4–15)
NEUTROPHILS # BLD AUTO: 2.3 K/UL (ref 1.8–7.7)
NEUTROPHILS NFR BLD: 51 % (ref 38–73)
NONHDLC SERPL-MCNC: 147 MG/DL
NRBC BLD-RTO: 0 /100 WBC
PLATELET # BLD AUTO: 374 K/UL (ref 150–450)
PMV BLD AUTO: 8.7 FL (ref 9.2–12.9)
POTASSIUM SERPL-SCNC: 4.2 MMOL/L (ref 3.5–5.1)
PROT SERPL-MCNC: 7.5 G/DL (ref 6–8.4)
RBC # BLD AUTO: 4.31 M/UL (ref 4–5.4)
SODIUM SERPL-SCNC: 139 MMOL/L (ref 136–145)
TRIGL SERPL-MCNC: 48 MG/DL (ref 30–150)
WBC # BLD AUTO: 4.55 K/UL (ref 3.9–12.7)

## 2023-12-15 PROCEDURE — 80053 COMPREHEN METABOLIC PANEL: CPT | Performed by: INTERNAL MEDICINE

## 2023-12-15 PROCEDURE — 80061 LIPID PANEL: CPT | Performed by: INTERNAL MEDICINE

## 2023-12-15 PROCEDURE — 36415 COLL VENOUS BLD VENIPUNCTURE: CPT | Performed by: INTERNAL MEDICINE

## 2023-12-15 PROCEDURE — 85025 COMPLETE CBC W/AUTO DIFF WBC: CPT | Performed by: INTERNAL MEDICINE

## 2023-12-15 NOTE — TELEPHONE ENCOUNTER
----- Message from Mauricio Hickman MD sent at 12/15/2023 11:36 AM CST -----  Please call the patient regarding the following results:    Your lab results are normal. Let me know if you have any questions or concerns.    Mauricio Hickman MD  Internal Medicine-Pediatrics

## 2023-12-15 NOTE — TELEPHONE ENCOUNTER
Spoke with patient and informed her that her lab results were normal. Patient verbalized understanding.

## 2024-02-20 ENCOUNTER — OFFICE VISIT (OUTPATIENT)
Dept: NEUROLOGY | Facility: CLINIC | Age: 39
End: 2024-02-20
Payer: COMMERCIAL

## 2024-02-20 VITALS
SYSTOLIC BLOOD PRESSURE: 124 MMHG | WEIGHT: 185.19 LBS | HEART RATE: 90 BPM | OXYGEN SATURATION: 99 % | HEIGHT: 67 IN | DIASTOLIC BLOOD PRESSURE: 87 MMHG | BODY MASS INDEX: 29.07 KG/M2

## 2024-02-20 DIAGNOSIS — G89.29 CHRONIC NONINTRACTABLE HEADACHE, UNSPECIFIED HEADACHE TYPE: ICD-10-CM

## 2024-02-20 DIAGNOSIS — R51.9 CHRONIC NONINTRACTABLE HEADACHE, UNSPECIFIED HEADACHE TYPE: ICD-10-CM

## 2024-02-20 PROCEDURE — 3074F SYST BP LT 130 MM HG: CPT | Mod: CPTII,S$GLB,, | Performed by: STUDENT IN AN ORGANIZED HEALTH CARE EDUCATION/TRAINING PROGRAM

## 2024-02-20 PROCEDURE — 3008F BODY MASS INDEX DOCD: CPT | Mod: CPTII,S$GLB,, | Performed by: STUDENT IN AN ORGANIZED HEALTH CARE EDUCATION/TRAINING PROGRAM

## 2024-02-20 PROCEDURE — 99999 PR PBB SHADOW E&M-EST. PATIENT-LVL III: CPT | Mod: PBBFAC,,, | Performed by: STUDENT IN AN ORGANIZED HEALTH CARE EDUCATION/TRAINING PROGRAM

## 2024-02-20 PROCEDURE — 3079F DIAST BP 80-89 MM HG: CPT | Mod: CPTII,S$GLB,, | Performed by: STUDENT IN AN ORGANIZED HEALTH CARE EDUCATION/TRAINING PROGRAM

## 2024-02-20 PROCEDURE — 1159F MED LIST DOCD IN RCRD: CPT | Mod: CPTII,S$GLB,, | Performed by: STUDENT IN AN ORGANIZED HEALTH CARE EDUCATION/TRAINING PROGRAM

## 2024-02-20 PROCEDURE — 99204 OFFICE O/P NEW MOD 45 MIN: CPT | Mod: S$GLB,,, | Performed by: STUDENT IN AN ORGANIZED HEALTH CARE EDUCATION/TRAINING PROGRAM

## 2024-02-20 NOTE — PROGRESS NOTES
Neurology Clinic Note      Date: 2/20/24  Patient Name: Maribel Valderrama   MRN: [id number]   PCP: Mauricio Hickman  Referring Provider: Mauricio Hickman MD    Assessment and Plan:   Maribel Valderrama is a 38 y.o. female presenting for evaluation of headaches.  Suspect that her headaches are likely related to stress, eye strain from looking at a screen for prolonged periods.  She does not have any red flag signs to suggest a secondary cause of headache.  Reasonable to defer any neuroimaging for now.  Recommend a trial of Mg Oxide 400mg daily and Vit B2 400mg daily.  Tylenol ES as needed. Max<5/month.    If no improvement or if she develops any new symptoms, will obtain MRI of the brain.    Problem List Items Addressed This Visit    None  Visit Diagnoses       Chronic nonintractable headache, unspecified headache type                  Subjective:          HPI:   Ms. Maribel Valderrama is a 38 y.o. female presenting evaluation of headaches    First started having headaches around October 2023.  Started off with pain in the left ear which progressed to a burning sensation in her scalp and pressure in the frontal region.  This lasted for about a week.  Following this, she continues to have nonspecific bifrontal/holocephalic dull headaches that occur 6-7 times a month.  The headaches are not intense and usually at 4/10.  Occasionally requires Tylenol or Motrin for abortive therapy.  No nausea/vomiting, photophobia or phonophobia.  Denies any visual symptoms.  Apart from the first headache in October, she denies any recurrent ear pain.  Denies any odynophagia.    Stress is a major trigger.  She works as a  at [employer] and spends a good deal of time looking at a screen.        PAST MEDICAL HISTORY:  Past Medical History:   Diagnosis Date    Atypical hyperplasia of breast 2020    right    Chronic constipation        PAST SURGICAL HISTORY:  Past Surgical History:   Procedure Laterality Date    BREAST  LUMPECTOMY Right 12/04/2021    COLONOSCOPY N/A 10/23/2018    Procedure: COLONOSCOPY;  Surgeon: Natalia Rocha MD;  Location: [facility] ([address] [place] [address]);  Service: Endoscopy;  Laterality: N/A;  constipation prep    Da Kory-assisted resection of complex left ovarian cyst x2 Left 06/22/2016    DONNA done at time of surgery -Dermoid and Hemorrhagic ovarian cyst    Ovarian Teratoma Removal Right 04/05/2010    15 weeks gestation, DERMOID RIGHT        CURRENT MEDS:  Current Outpatient Medications   Medication Sig Dispense Refill    tretinoin (RETIN-A) 0.025 % cream Apply topically every evening.      triamcinolone acetonide 0.1% (KENALOG) 0.1 % cream Apply topically 2 (two) times daily.       No current facility-administered medications for this visit.       ALLERGIES:  Review of patient's allergies indicates:   Allergen Reactions    Sulfamethoxazole-trimethoprim Other (See Comments)       FAMILY HISTORY:  Family History   Problem Relation Age of Onset    Hypertension Mother     Breast cancer Mother 56    Hypertension Father     Hypertension Brother     Hypertension Maternal Grandmother     Hypertension Maternal Grandfather     Hypertension Paternal Grandmother     Diabetes Maternal Uncle     Breast cancer Other     Ovarian cancer Neg Hx     Stroke Neg Hx     Colon cancer Neg Hx     Celiac disease Neg Hx     Cirrhosis Neg Hx     Colon polyps Neg Hx     Crohn's disease Neg Hx     Esophageal cancer Neg Hx     Inflammatory bowel disease Neg Hx     Liver cancer Neg Hx     Liver disease Neg Hx     Rectal cancer Neg Hx     Stomach cancer Neg Hx     Ulcerative colitis Neg Hx        SOCIAL HISTORY:  Social History     Tobacco Use    Smoking status: Never    Smokeless tobacco: Never   Substance Use Topics    Alcohol use: No    Drug use: No       Review of Systems:  12 system review of systems is negative except for the symptoms mentioned in HPI.      Objective:     Vitals:    02/20/24 0804   BP: 124/87   BP Location: Left arm   Patient  "Position: Sitting   BP Method: Small (Automatic)   Pulse: 90   SpO2: 99%   Weight: 84 kg (185 lb 3 oz)   Height: 5' 7" (1.702 m)     General: NAD, well nourished   Eyes: no tearing, discharge, no erythema   Neck: Supple, full range of motion  Cardiovascular: Warm and well perfused  Lungs: Normal work of breathing  Skin: No rash, lesions, or breakdown on exposed skin  Psychiatry: Mood and affect are appropriate       NEUROLOGICAL EXAMINATION:     MENTAL STATUS   Oriented to person, place, and time.   Follows 2 step commands.   Speech: speech is normal   Level of consciousness: alert    CRANIAL NERVES     CN II   Visual fields full to confrontation.     CN III, IV, VI   Extraocular motions are normal.   Nystagmus: none   Ophthalmoparesis: none    CN V   Facial sensation intact.        No obvious signs of papilledema     MOTOR EXAM        5/5 in bilateral upper and lower extremities     REFLEXES     Reflexes   Right brachioradialis: 2+  Left brachioradialis: 2+  Right biceps: 2+  Left biceps: 2+  Right patellar: 2+  Left patellar: 2+  Right plantar: normal  Left plantar: normal    SENSORY EXAM   Light touch normal.     GAIT AND COORDINATION     Gait  Gait: normal     Coordination   Finger to nose coordination: normal      Images:    Other Studies:          Mason Centeno MD  Department of Neurology  [facility]      "

## 2024-02-22 ENCOUNTER — TELEPHONE (OUTPATIENT)
Dept: FAMILY MEDICINE | Facility: CLINIC | Age: 39
End: 2024-02-22
Payer: COMMERCIAL

## 2024-02-22 DIAGNOSIS — Z12.31 BREAST CANCER SCREENING BY MAMMOGRAM: Primary | ICD-10-CM

## 2024-02-22 NOTE — TELEPHONE ENCOUNTER
----- Message from Segundo Galeano sent at 2/22/2024  9:07 AM CST -----  Type:  Mammogram    Caller is requesting to schedule their annual mammogram appointment.  Order is not listed in EPIC.  Please enter order and contact patient to schedule.  Name of Caller:pt   Where would they like the mammogram performed[facility]   Would the patient rather a call back or a response via [facility]? Call   Best Call Back Number: [phone]  Additional Information:

## 2024-04-11 ENCOUNTER — HOSPITAL ENCOUNTER (OUTPATIENT)
Dept: RADIOLOGY | Facility: OTHER | Age: 39
Discharge: HOME | End: 2024-04-11
Attending: INTERNAL MEDICINE
Payer: COMMERCIAL

## 2024-04-11 DIAGNOSIS — Z12.39 BREAST CANCER SCREENING, HIGH RISK PATIENT: ICD-10-CM

## 2024-04-11 DIAGNOSIS — N60.99 ATYPICAL DUCTAL HYPERPLASIA OF BREAST: ICD-10-CM

## 2024-04-11 PROCEDURE — 77049 MRI BREAST C-+ W/CAD BI: CPT | Mod: TC

## 2024-04-11 PROCEDURE — 25500020 PHARM REV CODE 255: Performed by: INTERNAL MEDICINE

## 2024-04-11 PROCEDURE — A9577 INJ MULTIHANCE: HCPCS | Performed by: INTERNAL MEDICINE

## 2024-04-11 PROCEDURE — 77049 MRI BREAST C-+ W/CAD BI: CPT | Mod: 26,,, | Performed by: RADIOLOGY

## 2024-04-11 RX ADMIN — GADOBENATE DIMEGLUMINE 16 ML: 529 INJECTION, SOLUTION INTRAVENOUS at 08:04

## 2024-06-21 ENCOUNTER — OFFICE VISIT (OUTPATIENT)
Dept: FAMILY MEDICINE | Facility: CLINIC | Age: 39
End: 2024-06-21
Payer: COMMERCIAL

## 2024-06-21 VITALS
HEART RATE: 84 BPM | BODY MASS INDEX: 29.54 KG/M2 | OXYGEN SATURATION: 96 % | WEIGHT: 188.19 LBS | HEIGHT: 67 IN | DIASTOLIC BLOOD PRESSURE: 60 MMHG | SYSTOLIC BLOOD PRESSURE: 110 MMHG | TEMPERATURE: 99 F

## 2024-06-21 DIAGNOSIS — F41.9 ANXIETY: ICD-10-CM

## 2024-06-21 DIAGNOSIS — Z00.00 ROUTINE ADULT HEALTH MAINTENANCE: Primary | ICD-10-CM

## 2024-06-21 DIAGNOSIS — Z12.39 BREAST CANCER SCREENING, HIGH RISK PATIENT: ICD-10-CM

## 2024-06-21 DIAGNOSIS — M54.2 NECK PAIN: ICD-10-CM

## 2024-06-21 PROCEDURE — 3078F DIAST BP <80 MM HG: CPT | Mod: CPTII,S$GLB,, | Performed by: INTERNAL MEDICINE

## 2024-06-21 PROCEDURE — 99999 PR PBB SHADOW E&M-EST. PATIENT-LVL III: CPT | Mod: PBBFAC,,, | Performed by: INTERNAL MEDICINE

## 2024-06-21 PROCEDURE — 3074F SYST BP LT 130 MM HG: CPT | Mod: CPTII,S$GLB,, | Performed by: INTERNAL MEDICINE

## 2024-06-21 PROCEDURE — 3008F BODY MASS INDEX DOCD: CPT | Mod: CPTII,S$GLB,, | Performed by: INTERNAL MEDICINE

## 2024-06-21 PROCEDURE — 99395 PREV VISIT EST AGE 18-39: CPT | Mod: S$GLB,,, | Performed by: INTERNAL MEDICINE

## 2024-06-21 RX ORDER — GABAPENTIN 100 MG/1
100 CAPSULE ORAL 2 TIMES DAILY PRN
Qty: 90 CAPSULE | Refills: 0 | Status: SHIPPED | OUTPATIENT
Start: 2024-06-21 | End: 2025-06-21

## 2024-06-21 NOTE — PROGRESS NOTES
Subjective:     Chief Complaint   Patient presents with    Anxiety    Annual Exam       HPI  Maribel Valderrama is a 39 y.o. female with medical diagnoses as listed in the medical history and problem list that presents for above complaint(s).    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    Chronic constipation        PAST SURGICAL HISTORY:  Past Surgical History:   Procedure Laterality Date    BREAST LUMPECTOMY Right 12/04/2021    COLONOSCOPY N/A 10/23/2018    Procedure: COLONOSCOPY;  Surgeon: Natalia Rocha MD;  Location: [facility] ([address] [place] [address]);  Service: Endoscopy;  Laterality: N/A;  constipation prep    Da Kory-assisted resection of complex left ovarian cyst x2 Left 06/22/2016    DONNA done at time of surgery -Dermoid and Hemorrhagic ovarian cyst    Ovarian Teratoma Removal Right 04/05/2010    15 weeks gestation, DERMOID RIGHT        SOCIAL HISTORY:  Social History     Socioeconomic History    Marital status:      Spouse name: Roxanne    Number of children: 1   Occupational History    Occupation: Registered Nurse     Comment: 3 [employer]   Tobacco Use    Smoking status: Never    Smokeless tobacco: Never   Substance and Sexual Activity    Alcohol use: No    Drug use: No    Sexual activity: Yes     Partners: Male     Birth control/protection: None     Comment:  to roxanne since 2009     Social Determinants of Health     Financial Resource Strain: Low Risk  (12/14/2023)    Overall Financial Resource Strain (CARDIA)     Difficulty of Paying Living Expenses: Not very hard   Food Insecurity: Unknown (12/14/2023)    Hunger Vital Sign     Worried About Running Out of Food in the Last Year:  Patient declined     Ran Out of Food in the Last Year: Never true   Transportation Needs: No Transportation Needs (12/14/2023)    PRAPARE - Transportation     Lack of Transportation (Medical): No     Lack of Transportation (Non-Medical): No   Physical Activity: Sufficiently Active (12/14/2023)    Exercise Vital Sign     Days of Exercise per Week: 5 days     Minutes of Exercise per Session: 90 min   Stress: Stress Concern Present (12/14/2023)    [place] [employer] - Occupational Stress Questionnaire     Feeling of Stress : Very much   Housing Stability: Low Risk  (12/14/2023)    Housing Stability Vital Sign     Unable to Pay for Housing in the Last Year: No     Number of Places Lived in the Last Year: 1     Unstable Housing in the Last Year: No       FAMILY HISTORY:  Family History   Problem Relation Name Age of Onset    Hypertension Mother      Breast cancer Mother  56    Hypertension Father      Hypertension Brother      Hypertension Maternal Grandmother      Hypertension Maternal Grandfather      Hypertension Paternal Grandmother      Diabetes Maternal Uncle      Breast cancer Other Mat Great Aunt     Ovarian cancer Neg Hx      Stroke Neg Hx      Colon cancer Neg Hx      Celiac disease Neg Hx      Cirrhosis Neg Hx      Colon polyps Neg Hx      Crohn's disease Neg Hx      Esophageal cancer Neg Hx      Inflammatory bowel disease Neg Hx      Liver cancer Neg Hx      Liver disease Neg Hx      Rectal cancer Neg Hx      Stomach cancer Neg Hx      Ulcerative colitis Neg Hx         ALLERGIES AND MEDICATIONS: updated and reviewed.  Review of patient's allergies indicates:   Allergen Reactions    Sulfamethoxazole-trimethoprim Other (See Comments)     Current Outpatient Medications   Medication Sig Dispense Refill    gabapentin (NEURONTIN) 100 MG capsule Take 1 capsule (100 mg total) by mouth 2 (two) times daily as needed (neck pain). 90 capsule 0    tretinoin (RETIN-A) 0.025 % cream Apply topically  "every evening.      triamcinolone acetonide 0.1% (KENALOG) 0.1 % cream Apply topically 2 (two) times daily.       No current facility-administered medications for this visit.         Objective:       Physical Exam  Vitals:    06/21/24 0740   BP: 110/60   Pulse: 84   Temp: 98.6 °F (37 °C)   TempSrc: Oral   SpO2: 96%   Weight: 85.3 kg (188 lb 2.6 oz)   Height: 5' 7" (1.702 m)    Body mass index is 29.47 kg/m².  Weight: 85.3 kg (188 lb 2.6 oz)   Height: 5' 7" (170.2 cm)   Physical Exam  Vitals reviewed.   Constitutional:       General: She is not in acute distress.     Appearance: Normal appearance. She is well-developed. She is not ill-appearing.   HENT:      Head: Normocephalic and atraumatic.      Right Ear: Tympanic membrane and ear canal normal.      Left Ear: Tympanic membrane and ear canal normal.   Eyes:      Conjunctiva/sclera: Conjunctivae normal.      Pupils: Pupils are equal, round, and reactive to light.   Neck:      Thyroid: No thyromegaly.   Cardiovascular:      Rate and Rhythm: Normal rate.      Heart sounds: Normal heart sounds. No murmur heard.  Pulmonary:      Effort: Pulmonary effort is normal. No respiratory distress.      Breath sounds: Normal breath sounds.   Musculoskeletal:         General: Tenderness (cervical paraspinous) present. No deformity.      Cervical back: Normal range of motion and neck supple.   Lymphadenopathy:      Cervical: No cervical adenopathy.   Skin:     General: Skin is warm and dry.   Neurological:      Mental Status: She is alert.      Cranial Nerves: No cranial nerve deficit.   Psychiatric:         Behavior: Behavior normal.             Assessment:     1. Routine adult health maintenance    2. Neck pain    3. Anxiety    4. Breast cancer screening, high risk patient      Plan:     Maribel was seen today for anxiety and annual exam.    Diagnoses and all orders for this visit:    Routine adult health maintenance  Discussed healthy diet, regular exercise, necessary labs, " age appropriate cancer screening, and routine vaccinations.    -     Lipid Panel; Future  -     Hemoglobin A1C; Future  -     Comprehensive Metabolic Panel; Future  -     CBC Auto Differential; Future    Neck pain  Home exercise physiotherapy recommended  Trial gabapentin given symptomatology  -     gabapentin (NEURONTIN) 100 MG capsule; Take 1 capsule (100 mg total) by mouth 2 (two) times daily as needed (neck pain).    Anxiety  Discussed mild psychosocial stress - conservative treatments    Breast cancer screening, high risk patient  Annual mammography/MRI discussed - up to date      Health Maintenance reviewed, addressed as per orders    F/u in 6 months      1. The patient indicates understanding of these issues and agrees with the plan. Brief care plan is updated and reviewed with the patient as applicable.   2. The patient is given an After Visit Summary that lists all medications with directions, allergies, orders placed during this encounter and follow-up instructions.   3. I have reviewed the patient's medical information including past medical, family, and social history sections including the medications and allergies.   4. We discussed the patient's current medications. I reconciled the patient's medication list and prepared and supplied needed refills.       Mauricio Hickman MD  Internal Medicine-Pediatrics

## 2024-09-26 ENCOUNTER — TELEPHONE (OUTPATIENT)
Dept: OBSTETRICS AND GYNECOLOGY | Facility: CLINIC | Age: 39
End: 2024-09-26
Payer: COMMERCIAL

## 2024-09-26 NOTE — TELEPHONE ENCOUNTER
----- Message from Larisa Willis sent at 9/26/2024  8:23 AM CDT -----  Type:  Sooner Apoointment Request    Caller is requesting a sooner appointment.  Caller declined first available appointment listed below.  Caller will not accept being placed on the waitlist and is requesting a message be sent to doctor.  Name of Caller:JOLENE GUTIERREZ [[id number]]    When is the first available appointment?12/13    Symptoms:annual     Would the patient rather a call back or a response via [facility]? Call back     Best Call Back Number: [phone]    Additional Information: patient states she would like to see the provider before the soonest available appointment. Patient states she was advised by the providers office to reach out for a sooner appointment if needed. Patient states she would like to have her annual visit before 12/13 if possible. Please call back with further assistance.

## 2024-10-03 ENCOUNTER — OFFICE VISIT (OUTPATIENT)
Dept: OBSTETRICS AND GYNECOLOGY | Facility: CLINIC | Age: 39
End: 2024-10-03
Payer: COMMERCIAL

## 2024-10-03 VITALS — DIASTOLIC BLOOD PRESSURE: 71 MMHG | BODY MASS INDEX: 29.29 KG/M2 | WEIGHT: 187 LBS | SYSTOLIC BLOOD PRESSURE: 120 MMHG

## 2024-10-03 DIAGNOSIS — N89.8 VAGINAL DISCHARGE: ICD-10-CM

## 2024-10-03 DIAGNOSIS — Z01.419 WELL WOMAN EXAM WITH ROUTINE GYNECOLOGICAL EXAM: Primary | ICD-10-CM

## 2024-10-03 DIAGNOSIS — Z91.89 AT HIGH RISK FOR BREAST CANCER: ICD-10-CM

## 2024-10-03 PROCEDURE — 99395 PREV VISIT EST AGE 18-39: CPT | Mod: S$GLB,,, | Performed by: OBSTETRICS & GYNECOLOGY

## 2024-10-03 PROCEDURE — 1159F MED LIST DOCD IN RCRD: CPT | Mod: CPTII,S$GLB,, | Performed by: OBSTETRICS & GYNECOLOGY

## 2024-10-03 PROCEDURE — 99999 PR PBB SHADOW E&M-EST. PATIENT-LVL III: CPT | Mod: PBBFAC,,, | Performed by: OBSTETRICS & GYNECOLOGY

## 2024-10-03 PROCEDURE — 3008F BODY MASS INDEX DOCD: CPT | Mod: CPTII,S$GLB,, | Performed by: OBSTETRICS & GYNECOLOGY

## 2024-10-03 PROCEDURE — 3078F DIAST BP <80 MM HG: CPT | Mod: CPTII,S$GLB,, | Performed by: OBSTETRICS & GYNECOLOGY

## 2024-10-03 PROCEDURE — 3074F SYST BP LT 130 MM HG: CPT | Mod: CPTII,S$GLB,, | Performed by: OBSTETRICS & GYNECOLOGY

## 2024-10-03 NOTE — PROGRESS NOTES
History & Physical  Gynecology Problem Visit      SUBJECTIVE:     Chief Complaint: Well Woman       History of Present Illness:  Annual Exam-Premenopausal  Ms. Valderrama is a 38 y/o female who presents for annual exam. The patient has no complaints today. She reports that her periods are monthly last 5-6 days every 21 days.       The patient is sexually active with her . GYN screening history: last pap: approximate date  and was normal. The patient wears seatbelts: yes. The patient participates in regular exercise: no. Has the patient ever been transfused or tattooed?: no. The patient reports that there is not domestic violence in her life.      Review of patient's allergies indicates:   Allergen Reactions    Sulfamethoxazole-trimethoprim Other (See Comments)       Past Medical History:   Diagnosis Date    Atypical hyperplasia of breast     right    Chronic constipation      Past Surgical History:   Procedure Laterality Date    BREAST LUMPECTOMY Right 2021    COLONOSCOPY N/A 10/23/2018    Procedure: COLONOSCOPY;  Surgeon: Natalia Rocha MD;  Location: [facility] ([address] [place] [address]);  Service: Endoscopy;  Laterality: N/A;  constipation prep    Da Kory-assisted resection of complex left ovarian cyst x2 Left 2016    DONNA done at time of surgery -Dermoid and Hemorrhagic ovarian cyst    Ovarian Teratoma Removal Right 2010    15 weeks gestation, DERMOID RIGHT      OB History          3    Para   2    Term   2            AB   1    Living   2         SAB   1    IAB        Ectopic        Multiple   0    Live Births   2           Obstetric Comments     No abnormal pap  No STDs               Family History   Problem Relation Name Age of Onset    Hypertension Mother      Breast cancer Mother  56    Hypertension Father      Hypertension Brother      Hypertension Maternal Grandmother      Hypertension Maternal Grandfather      Hypertension Paternal Grandmother      Diabetes Maternal Uncle       Breast cancer Other Mat Great Aunt     Ovarian cancer Neg Hx      Stroke Neg Hx      Colon cancer Neg Hx      Celiac disease Neg Hx      Cirrhosis Neg Hx      Colon polyps Neg Hx      Crohn's disease Neg Hx      Esophageal cancer Neg Hx      Inflammatory bowel disease Neg Hx      Liver cancer Neg Hx      Liver disease Neg Hx      Rectal cancer Neg Hx      Stomach cancer Neg Hx      Ulcerative colitis Neg Hx       Social History     Tobacco Use    Smoking status: Never    Smokeless tobacco: Never   Substance Use Topics    Alcohol use: No    Drug use: No       Current Outpatient Medications   Medication Sig    gabapentin (NEURONTIN) 100 MG capsule Take 1 capsule (100 mg total) by mouth 2 (two) times daily as needed (neck pain).    tretinoin (RETIN-A) 0.025 % cream Apply topically every evening.    triamcinolone acetonide 0.1% (KENALOG) 0.1 % cream Apply topically 2 (two) times daily.     No current facility-administered medications for this visit.         Review of Systems:  Review of Systems   Constitutional:  Negative for chills and fever.   HENT:  Negative for congestion.    Eyes:  Negative for visual disturbance.   Respiratory:  Negative for cough and shortness of breath.    Cardiovascular:  Negative for chest pain.   Gastrointestinal:  Negative for abdominal pain, nausea and vomiting.   Genitourinary:  Negative for dysuria, hematuria, vaginal bleeding and vaginal discharge.   Skin:  Negative for rash.   Neurological:  Negative for headaches.   Hematological:  Does not bruise/bleed easily.        OBJECTIVE:   Vitals:     Vitals:    10/03/24 1430   BP: 120/71   Weight: 84.8 kg (187 lb 0.3 oz)        Physical Exam:  Physical Exam  Vitals and nursing note reviewed. Exam conducted with a chaperone present.   Constitutional:       Appearance: She is well-developed.   Cardiovascular:      Rate and Rhythm: Normal rate.   Pulmonary:      Effort: Pulmonary effort is normal. No respiratory distress.   Chest:    Breasts:     Breasts are symmetrical.   Abdominal:      General: There is no distension.      Palpations: Abdomen is soft.      Tenderness: There is no abdominal tenderness.   Genitourinary:     Vagina: Vaginal discharge present.   Skin:     General: Skin is warm and dry.   Neurological:      Mental Status: She is alert and oriented to person, place, and time.       ASSESSMENT:       ICD-10-CM ICD-9-CM    1. Well woman exam with routine gynecological exam  Z01.419 V72.31 Liquid-Based Pap Smear, Screening      HPV High Risk Genotypes, PCR      2. Vaginal discharge  N89.8 623.5 Vaginosis Screen by DNA Probe      C. trachomatis/N. gonorrhoeae by AMP DNA [facility]; Cervicovaginal      3. At high risk for breast cancer  Z91.89 V49.89         Plan:   Maribel was seen today for well woman.    Diagnoses and all orders for this visit:    Well woman exam with routine gynecological exam  -     Liquid-Based Pap Smear, Screening  -     HPV High Risk Genotypes, PCR    Vaginal discharge  -     Vaginosis Screen by DNA Probe  -     C. trachomatis/N. gonorrhoeae by AMP DNA [facility]; Cervicovaginal    At high risk for breast cancer  - Mammogram and MRI      Orders Placed This Encounter   Procedures    HPV High Risk Genotypes, PCR    Vaginosis Screen by DNA Probe    C. trachomatis/N. gonorrhoeae by AMP DNA [facility]; Cervicovaginal       Follow up in about 1 year (around 10/3/2025) for Well Woman/Annual.

## 2024-12-05 ENCOUNTER — PATIENT OUTREACH (OUTPATIENT)
Dept: ADMINISTRATIVE | Facility: HOSPITAL | Age: 39
End: 2024-12-05
Payer: COMMERCIAL

## 2024-12-27 ENCOUNTER — LAB VISIT (OUTPATIENT)
Dept: LAB | Facility: HOSPITAL | Age: 39
End: 2024-12-27
Attending: INTERNAL MEDICINE
Payer: COMMERCIAL

## 2024-12-27 ENCOUNTER — OFFICE VISIT (OUTPATIENT)
Dept: FAMILY MEDICINE | Facility: CLINIC | Age: 39
End: 2024-12-27
Payer: COMMERCIAL

## 2024-12-27 VITALS
OXYGEN SATURATION: 96 % | WEIGHT: 187.94 LBS | HEIGHT: 67 IN | TEMPERATURE: 99 F | DIASTOLIC BLOOD PRESSURE: 78 MMHG | SYSTOLIC BLOOD PRESSURE: 122 MMHG | HEART RATE: 85 BPM | BODY MASS INDEX: 29.5 KG/M2

## 2024-12-27 DIAGNOSIS — M54.2 NECK PAIN: ICD-10-CM

## 2024-12-27 DIAGNOSIS — F41.9 ANXIETY: Primary | ICD-10-CM

## 2024-12-27 DIAGNOSIS — L70.0 ACNE VULGARIS: ICD-10-CM

## 2024-12-27 DIAGNOSIS — Z91.89 AT INCREASED RISK OF BREAST CANCER: ICD-10-CM

## 2024-12-27 DIAGNOSIS — Z00.00 ROUTINE ADULT HEALTH MAINTENANCE: ICD-10-CM

## 2024-12-27 LAB
ALBUMIN SERPL BCP-MCNC: 3.7 G/DL (ref 3.5–5.2)
ALP SERPL-CCNC: 53 U/L (ref 40–150)
ALT SERPL W/O P-5'-P-CCNC: 23 U/L (ref 10–44)
ANION GAP SERPL CALC-SCNC: 7 MMOL/L (ref 8–16)
AST SERPL-CCNC: 19 U/L (ref 10–40)
BASOPHILS # BLD AUTO: 0.01 K/UL (ref 0–0.2)
BASOPHILS NFR BLD: 0.2 % (ref 0–1.9)
BILIRUB SERPL-MCNC: 0.3 MG/DL (ref 0.1–1)
BUN SERPL-MCNC: 15 MG/DL (ref 6–20)
CALCIUM SERPL-MCNC: 9.2 MG/DL (ref 8.7–10.5)
CHLORIDE SERPL-SCNC: 106 MMOL/L (ref 95–110)
CHOLEST SERPL-MCNC: 195 MG/DL (ref 120–199)
CHOLEST/HDLC SERPL: 4.2 {RATIO} (ref 2–5)
CO2 SERPL-SCNC: 25 MMOL/L (ref 23–29)
CREAT SERPL-MCNC: 0.8 MG/DL (ref 0.5–1.4)
DIFFERENTIAL METHOD BLD: ABNORMAL
EOSINOPHIL # BLD AUTO: 0.3 K/UL (ref 0–0.5)
EOSINOPHIL NFR BLD: 5.2 % (ref 0–8)
ERYTHROCYTE [DISTWIDTH] IN BLOOD BY AUTOMATED COUNT: 12.4 % (ref 11.5–14.5)
EST. GFR  (NO RACE VARIABLE): >60 ML/MIN/1.73 M^2
ESTIMATED AVG GLUCOSE: 108 MG/DL (ref 68–131)
GLUCOSE SERPL-MCNC: 83 MG/DL (ref 70–110)
HBA1C MFR BLD: 5.4 % (ref 4–5.6)
HCT VFR BLD AUTO: 39 % (ref 37–48.5)
HDLC SERPL-MCNC: 46 MG/DL (ref 40–75)
HDLC SERPL: 23.6 % (ref 20–50)
HGB BLD-MCNC: 12.7 G/DL (ref 12–16)
IMM GRANULOCYTES # BLD AUTO: 0.01 K/UL (ref 0–0.04)
IMM GRANULOCYTES NFR BLD AUTO: 0.2 % (ref 0–0.5)
LDLC SERPL CALC-MCNC: 135.8 MG/DL (ref 63–159)
LYMPHOCYTES # BLD AUTO: 1.9 K/UL (ref 1–4.8)
LYMPHOCYTES NFR BLD: 36 % (ref 18–48)
MCH RBC QN AUTO: 31.1 PG (ref 27–31)
MCHC RBC AUTO-ENTMCNC: 32.6 G/DL (ref 32–36)
MCV RBC AUTO: 96 FL (ref 82–98)
MONOCYTES # BLD AUTO: 0.5 K/UL (ref 0.3–1)
MONOCYTES NFR BLD: 10.3 % (ref 4–15)
NEUTROPHILS # BLD AUTO: 2.5 K/UL (ref 1.8–7.7)
NEUTROPHILS NFR BLD: 48.1 % (ref 38–73)
NONHDLC SERPL-MCNC: 149 MG/DL
NRBC BLD-RTO: 0 /100 WBC
PLATELET # BLD AUTO: 340 K/UL (ref 150–450)
PMV BLD AUTO: 9.5 FL (ref 9.2–12.9)
POTASSIUM SERPL-SCNC: 4.1 MMOL/L (ref 3.5–5.1)
PROT SERPL-MCNC: 7.1 G/DL (ref 6–8.4)
RBC # BLD AUTO: 4.08 M/UL (ref 4–5.4)
SODIUM SERPL-SCNC: 138 MMOL/L (ref 136–145)
TRIGL SERPL-MCNC: 66 MG/DL (ref 30–150)
WBC # BLD AUTO: 5.16 K/UL (ref 3.9–12.7)

## 2024-12-27 PROCEDURE — 36415 COLL VENOUS BLD VENIPUNCTURE: CPT | Mod: PO | Performed by: INTERNAL MEDICINE

## 2024-12-27 PROCEDURE — 85025 COMPLETE CBC W/AUTO DIFF WBC: CPT | Performed by: INTERNAL MEDICINE

## 2024-12-27 PROCEDURE — 80053 COMPREHEN METABOLIC PANEL: CPT | Performed by: INTERNAL MEDICINE

## 2024-12-27 PROCEDURE — 80061 LIPID PANEL: CPT | Performed by: INTERNAL MEDICINE

## 2024-12-27 PROCEDURE — 99999 PR PBB SHADOW E&M-EST. PATIENT-LVL III: CPT | Mod: PBBFAC,,, | Performed by: INTERNAL MEDICINE

## 2024-12-27 PROCEDURE — 83036 HEMOGLOBIN GLYCOSYLATED A1C: CPT | Performed by: INTERNAL MEDICINE

## 2024-12-27 RX ORDER — GABAPENTIN 100 MG/1
100 CAPSULE ORAL 2 TIMES DAILY PRN
Qty: 90 CAPSULE | Refills: 1 | Status: SHIPPED | OUTPATIENT
Start: 2024-12-27 | End: 2025-12-27

## 2024-12-27 RX ORDER — AZITHROMYCIN 250 MG/1
250 TABLET, FILM COATED ORAL
COMMUNITY
Start: 2024-08-03 | End: 2024-12-27

## 2024-12-27 RX ORDER — METHYLPREDNISOLONE 4 MG/1
TABLET ORAL
COMMUNITY
Start: 2024-08-03 | End: 2024-12-27

## 2024-12-27 NOTE — PROGRESS NOTES
Subjective:     History of Present Illness    CHIEF COMPLAINT:  - Maribel presents for an annual wellness visit and follow-up on neck pain and anxiety.    HPI:  Maribel reports ongoing neck pain, which has been stable recently. She has been taking gabapentin for this issue. Maribel also mentions a history of anxiety, which she describes as well-controlled and mild. She has found alternative coping mechanisms, including relaxation techniques, watching television, and social interaction. Maribel declined to take a previously prescribed medication for anxiety as she felt symptoms had improved somewhat, though not entirely resolved. She expresses that her anxiety is manageable and does not require psychiatric intervention at this time. Maribel reports overall good health, with no new joint pain or other significant symptoms arising since her last visit.    Maribel denies swelling in the legs, blood clots, breathing difficulties, chest pain, and any other joint pain besides neck pain.    MEDICATIONS:  - Gabapentin for neck pain  - Retin-A for acne    MEDICAL HISTORY:  - Anxiety  - Acne  - atypical ductal hyperplasia of breast (right)  - Last MRI: April 2023, negative for masses  - Contraception: current condom use  - Sexually active: Yes  - Children: 2 children (teenager and 7-year-old)    FAMILY HISTORY:  - Mother: Negative genetic testing for breast cancer  - Grandmother's nephew: Tested positive for breast cancer    TEST RESULTS:  - Cholesterol: Last December, LDL was 137 (mildly elevated)  - D-dimer: 3-4 years ago, mildly elevated  - Genetic testing (mother): Recently, negative    IMAGING:  - MRI Breast: April, negative, no masses, normal appearance  - CT (unspecified): 3-4 years ago, no clots, small pleural effusion    SOCIAL HISTORY:  - Occupation: Current job for about 6 years      ROS:  Cardiovascular: -chest pain  Respiratory: -shortness of breath  Musculoskeletal: -joint pain, +neck pain  Psychiatric:  +anxiety         Objective:     Vitals:    12/27/24 0658   BP: 122/78   Pulse: 85   Temp: 98.7 °F (37.1 °C)       Physical Exam    Vitals: Weight: 187 lbs. Blood pressure: 122/78.  Cardiovascular: Regular rate. Regular rhythm. No murmurs. No rubs. No gallops. Normal S1, S2.         Assessment/Plan     1. Anxiety        2. Neck pain  gabapentin (NEURONTIN) 100 MG capsule      3. At increased risk of breast cancer  Ambulatory referral/consult to Genetics    MRI Breast w/o Contrast, Bilateral          Assessment & Plan     Assessed neck pain, anxiety, and overall health status   Reviewed previous lab results, noting mildly elevated LDL (137) last year   Evaluated breast cancer risk, with current lifetime risk at 36.5% per last T-C score    Considered genetic testing for breast cancer risk assessment   Reviewed recent breast MRI results (April), which were negative for masses   Evaluated need for colonoscopy, determining patient is not yet due   Assessed cardiovascular health through blood pressure measurement and heart exam    BREAST CANCER RISK AND GENETIC SUSCEPTIBILITY:  Discussed the importance of genetic testing for breast cancer risk assessment.  Explained the differences between  elevated-risk breast cancer profiles.  Referred to genetics for breast cancer risk assessment and potential genetic testing.  Contact office if genetics department does not reach out to schedule consultation.    BREAST CANCER SCREENING:  Mammo scheduled - Ordered breast MRI for April 2025.    ACNE:  Continued Retin-A for acne treatment.    NECK PAIN:  Continued gabapentin at current dose and provided refill.    ANXIETY   Mild - well controlled    GENERAL HEALTH SCREENING:  Ordered comprehensive lab panel including cholesterol, diabetes screen, blood counts, liver and kidney function tests, and electrolytes.  Review lab results when available.    FOLLOW-UP:  Follow up in 1 year for annual exam.         This note was generated with the  assistance of ambient listening technology. Verbal consent was obtained by the patient and accompanying visitor(s) for the recording of patient appointment to facilitate this note. I attest to having reviewed and edited the generated note for accuracy, though some syntax or spelling errors may persist. Please contact the author of this note for any clarification.    Visit today included increased complexity associated with the care of the episodic problems addressed and managing the longitudinal care of the patient due to the serious and/or complex managed problem(s) as per assessment/plan.         Mauricio Hickman MD  Internal Medicine-Pediatrics

## 2025-01-08 ENCOUNTER — TELEPHONE (OUTPATIENT)
Dept: HEMATOLOGY/ONCOLOGY | Facility: CLINIC | Age: 40
End: 2025-01-08
Payer: COMMERCIAL

## 2025-01-14 ENCOUNTER — HOSPITAL ENCOUNTER (OUTPATIENT)
Dept: RADIOLOGY | Facility: HOSPITAL | Age: 40
Discharge: HOME | End: 2025-01-14
Attending: INTERNAL MEDICINE
Payer: COMMERCIAL

## 2025-01-14 ENCOUNTER — PATIENT MESSAGE (OUTPATIENT)
Dept: HEMATOLOGY/ONCOLOGY | Facility: CLINIC | Age: 40
End: 2025-01-14
Payer: COMMERCIAL

## 2025-01-14 DIAGNOSIS — Z12.31 BREAST CANCER SCREENING BY MAMMOGRAM: ICD-10-CM

## 2025-01-14 PROCEDURE — 77067 SCR MAMMO BI INCL CAD: CPT | Mod: 26,,, | Performed by: RADIOLOGY

## 2025-01-14 PROCEDURE — 77063 BREAST TOMOSYNTHESIS BI: CPT | Mod: TC

## 2025-01-14 PROCEDURE — 77063 BREAST TOMOSYNTHESIS BI: CPT | Mod: 26,,, | Performed by: RADIOLOGY

## 2025-01-14 NOTE — PROGRESS NOTES
"Cancer Genetics  Department of Hematology and Oncology  [facility]    Date of Service:  1/15/25  Visit Provider:  Kash Francois DNP  Collaborating Physician:  Anna Cormier MD    Patient ID  Name: Maribel Valderrama    : 1985    MRN: [id number]      Referring Provider  Mauricio Hickman MD  [address]  [place]  [place] [address]    SUBJECTIVE      Chief Complaint: Genetic Evaluation    History of Present Illness (HPI):  Maribel Valderrama ("Maribel"), 39 y.o., assigned female sex at birth, is new to the [facility] Department of [facility]ology [facility] On[facility] and to me (patient last seen in this department, by another provider, in ).  She was referred by Dr. Mauricio Hickman for cancer-genetic risk assessment.    Focused Medical History  Genetic testing:  No  Cancer:  No  Colon polyp:  No    10/23/2018 (age 33) colonoscopy (Dr. MUKUND Rocha at [facility]):  No polyps  History of colonoscopy in addition to those abovementioned?  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 Carballo, 2020)  Pancreatitis:  No  Blood disorder:  No    Focused Surgical History  Reproductive organs:  Intact    Breast Cancer Risk Assessment Questionnaire   Mammographic breast density:  scattered fibroglandular densities   Age at menarche:  12  Age at first live childbirth:  , 1st live birth at 25 (1 miscarriage)  Menopausal status:  premenopausal; LMP approx LMP 2025  Hormone replacement therapy use history:  never  Breast biopsy history and findings:  just one as above  Thoracic radiation therapy history:  never    Focused Social History  Never smoker  Occupation:  Cardiac  nurse    ONCOLOGY PEDIGREE  Ancestry:  Ashkenazi " Baptism:  No  Consanguinity:  No  Hereditary cancer genetic testing in blood relatives:  Yes  Mother:  Integrated BRACAnalysis with [place] Hereditary Cancer test through [employer] (ordered by Manisha Galicia MD); blood collected on Oct 16, 2020, with results reported on Oct 27, 2020; [employer] Accession # [id number]; RESULT:  Negative  Genes Analyzed: Unless otherwise noted sequencing and large rearrangement analyses were performed on the following genes:  APC, FREDDIE, AXIN2, BARD1, BMPR1A, BRCA1, BRCA2, BRIP1, CDH1, CDK4, CDKN2A, CHEK2, EPCAM (large rearrangement only), HOXB13 (sequencing only), GALNT12, MLH1, MSH2, MSH3 (excluding repetitive portions of exon 1), MSH6, MUTYH, NBN, NTHL1, PALB2, PMS2, PTEN, RAD51C, RAD51D, RNF43, RPS20, SMAD4, STK11, TP53. Sequencing was performed for select regions of POLE and POLD1, and large rearrangement analysis was performed for select regions of GREM1 (see technical specifications).  BRCA1/2 Analyses with [email or web address] [facility]: Analyses of Genes Associated with Hereditary Cancer (85 genes) through [employer] (ordered by Rolando Middleton MD/Santosh Chapa GC); blood collected on Nov 25, 2024, with results reported on Dec 19, 2024; [employer] Accession # [id number]; RESULT:  Negative  Genes Analyzed (85 total): AIP, ALK, APC, FREDDIE, BAP1, BARD1, BMPR1A, BRCA1, BRCA2, BRIP1, CDC73, CDH1, CDK4, CDKN1B, CDKN2A, CEBPA, CHEK2, DICER1, ETV6, FH, FLCN, GATA2, KIF1B, LZTR1, MAX, MEN1, MET, MLH1, MSH2, MSH6, MUTYH, NF1, NF2, NTHL1, PALB2, PHOX2B, PMS2, POT1, DERWG7T, PTCH1, PTEN, RAD51C, RAD51D, RB1, RET, RUNX1, SDHA, SDHAF2, SDHB, SDHC, SDHD, SMAD4, SMARCA4, SMARCB1, SMARCE1, STK11, SUFU, MYRL924, TP53, TSC1, TSC2, VHL and WT1 (sequencing and deletion/duplication); ATRIP, AXIN2, CTNNA1, DDX41, EGFR, EGLN1, HOXB13, KIT, MBD4, MITF, MLH3, MSH3, PALLD, PDGFRA, POLD1, POLE, RAD51B, RNF43, RPS20 and TERT (sequencing only); EPCAM and GREM1 (deletion/duplication only). RNA data is routinely analyzed  "for use in variant interpretation for all genes.  Comment(s) per [facility]: The TP53 "c.672+1G>A" variant, which was previously identified in this individual's relative(s) per provided document(s), was not detected in this individual's specimen.  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  Other than noted, no known family history of benign tumor/mass/lesion or colon polyps.    ** If this pedigree appears small/illegible on your screen, expand this note window horizontally. **    Maternal great-aunt with breast cancer at age 71 is family member with TP53 mutation on germline testing.    Review of Systems  See HPI.    Patient's Distress Score today was 6/10 (scale of 0-10 on which 10=worst).  Patient attributes this to work.  Patient denies experiencing thoughts of harming self or others.  Offered patient a referral to Behavioral Health, and patient declines.      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  Unaccompanied.  Mother, Cheri, joined part of the visit via phone call.  Vitals signs:  Reviewed:  Vitals:    01/15/25 1100   PainSc: 0-No pain     (Pain is rated on scale of 0-10 on " "which 10=worst.)  Constitutional      Appearance:  Appears well developed and well nourished. No distress.   Pulmonary     Effort:  Normal.  Neurological     Mental Status:  Alert and oriented.     Coordination:  Normal.   Psychiatric         Mood and Affect:  Normal.     Thought Content:  Normal.     Speech:  Normal.     Behavior:  Normal.     Judgment:  Normal.    ASSESSMENT / PLAN      Maribel Valderrama ("Maribel"), 39 y.o., assigned female sex at birth, is new to the [facility] Department of [facility] and to me (patient last seen in this department, by another provider, in 2021).  She was referred by Dr. Mauricio Hickman for cancer-genetic risk assessment.      Maribel's personal history is significant for a cystic teratoma in her right ovary at age 25 and left ovary at age 31.  Her other underwent a broad, multiple-cancer germline genetic panel, with negative results.  Regarding Maribel's maternal grandfather's sisters TP53 mutation reported on that individual's germline testing as being possibly mosaic, without results of further testing I am unable to say whether this individual had (a) Li-Fraumeni syndrome, vs. (b) a mosaic version of Li-Fraumeni syndrome, vs. (c) constitutional post-zygotic mosaicism, vs. (d) other cause of this result on germline testing.  In any event, this individual's TP53 mutation was not detected on Maribel's mother's germline testing, so germline testing for the mutation is not indicated for Maribel at this time.  Regarding Maribel's paternal grandmother's gastrointestinal stromal tumor (GIST), if the tumor had been tested and found to be SDH-deficient, germline testing assessment would have been indicated for Maribel's grandmother, as individuals with a germline SDH gene mutation are at risk of developing paraganglioma.      Only approximately 5%-10% of cancers are caused by an inherited cancer susceptibility gene mutation; rather, the majority of cancers are " sporadic.  Causes of sporadic cancer may include environmental risk factors, lifestyle risk factors, and non-modifiable risk factors.  Family members often share not only genetics but also other risk factors, including environmental and lifestyle risk factors, so cancers can be familial.    If Maribel would like to proceed with testing of her SDH genes based on her paternal grandmother's history of GIST at any point, she is to let me know.            ICD-10-CM ICD-9-CM   1. Encounter for nonprocreative genetic counseling  Z71.83 V26.33   2. Family history of gene mutation  Z84.81 V18.9   3. Family history of tumor  Z84.89 V19.8   4. At increased risk of breast cancer  Z91.89 V15.89     1. Encounter for nonprocreative genetic counseling    2. Family history of gene mutation  - See above    3. Family history of tumor  - See above    4. At increased risk of breast cancer  - Ambulatory referral/consult to Genetics:  Completed  - Ambulatory referral/consult to Breast Surgery; Future -- [facility]            Information to Follow Your Cancer Genetics Visit    Maribel:  Below is some information to follow your cancer genetics visit.  Please read this information and let me know if you have any questions or concerns.    Health Maintenance / Cancer Risks and Risk Management    Only a small percentage (approximately 5%-10%) of cancers are hereditary, meaning caused by an inherited gene mutation; rather, most cancers are sporadic.  Environmental factors, lifestyle factors, and even factors beyond our control play a significant role in the development of many cancers.  As such, an individual can develop cancer even if they don't have an identifiable hereditary predisposition.  Furthermore, even with negative genetic testing, an individual can still be at increased risk for certain cancers, as they may independently have risk factors for those cancers and/or may share risk factors with their family members  "affected by cancer.  For these reasons, it is strongly recommended that you ensure that your healthcare providers are aware of and up-to-date on your personal and family history so that your medical management, including cancer screenings, can be based in part off of this information.      The following cancer screenings are currently recommended for you (please note that these recommendations can change based on updates to clinical guidelines and/or with changes to your medical or family history and are therefore only considered accurate as of the date on which this document was composed; additional and/or alternative screenings may be recommended by your healthcare providers, and recommendations from your healthcare providers directly managing these risks supersede the below recommendations):     The terms "female" and "male" below refer to assigned sex at birth.    Cancer in general:    Attend an annual visit with a primary care provider (PCP) for history review, physical exam, and age-appropriate testing.    Gynecologic cancers (females only):  Attend an annual visit with your gynecology provider, which may include pelvic exam and/or Pap smear/HPV testing.    Breast cancer (females only):  There are models that help us to "calculate" your breast cancer risk.  Your current, estimated risks for developing breast cancer are as follows, per Tyrer-Cuzick 8 model in Epic:    5-year:  2.35% (increased risk)  10-year:  5.89% (does reach the threshold at which risk-reducing medication is recommended unless otherwise contraindicated)  Lifetime:  36.37% (increased risk)    If you have never had breast cancer, undergo ongoing breast cancer risk assessment and breast cancer risk-reduction counseling with your gynecology provider (or you can reach out to me for such) or, if you have one, your breast specialist.   Whether you have breasts or have undergone mastectomies, practice ongoing breast awareness, meaning you are " familiar with your breasts, perform breast self-exams at least monthly, and promptly report changes/concerns to your gynecology provider or breast specialist/oncologist.  Undergo clinical breast exam by a healthcare provider every 12 months or, if your gynecology provider or breast specialist/oncologist recommends, more often.  If you have one or both of your breasts (in other words, you haven't undergone a double mastectomy), undergo annual mammogram.  This can be ordered by your PCP, gynecology provider, or breast specialist/oncologist.  If you have a breast specialist/oncologist who has recommended such, undergo supplemental screenings for breast cancer.    Colorectal cancer:  Determine with your PCP or, if you have one, your gastroenterology (GI) provider whether you are considered to be at average risk versus increased risk for colorectal cancer.  Based on your risk category and corresponding guideline recommendations and, if applicable, the results of your prior colorectal cancer screening, undergo screenings (which may consist of colonoscopy or other test) for colorectal cancer as recommended by your PCP or GI provider.  With regard to family history, please let me know if any of the following applies, as such could change colorectal cancer screening recommendations for you:  If you learn moving forward that any blood relative of yours has been diagnosed with colorectal cancer.  If a first-degree relative (i.e., your parent, sibling, or child) has a colorectal polyp history including any of the following characteristics:  Adenoma with high-grade dysplasia  Adenoma or sessile serrated polyp/adenoma 1 cm or larger in size  Villous or tubulovillous adenoma  Traditional serrated adenoma (TSA)  Sessile serrated lesion/polyp/adenoma with any dysplasia  Cumulatively 10 or more polyps    Prostate cancer (males only):  Determine with your PCP or, if you have one, your urology provider whether you are considered to be  at average risk versus increased risk for prostate cancer.  Based on your risk category and corresponding guideline recommendations and, if applicable, the results of your prior prostate cancer screening, undergo screenings for prostate cancer, which include prostate-specific antigen (PSA) testing with or without digital rectal exam (LORNA).    Skin cancer:  Undergo total-body skin examination (TBSE) with a dermatology provider annually or, if recommended by your dermatology provider, more often.    Pancreatic cancer:  If you have been told you are at increased risk of developing pancreatic cancer based on significant family history and/or other risk factors, consult with a pancreas specialist about undergoing annual screening for pancreatic cancer, which often consists of MRI/MR cholangiopancreatography (MRCP) in alternation with annual endoscopic ultrasound (EUS) of the pancreas.    Lung cancer:  If you have a 20-pack-year smoking history or greater, shared patient/provider decision-making regarding low-dose CT scan (LDCT) is recommended starting at age 50.  Please let me know if you do have at least a 20-pack-year smoking history or if you are unsure.     Other cancer:  Undergo screenings/surveillance as recommended by your healthcare providers.    Referrals placed based on the above:  Please notify me if you do not hear from these office(s) within the next 2 weeks to schedule an appointment.  [facility]     If you are not established with a healthcare specialist listed above, please let me know so I can refer you.    Some information regarding general cancer risk reduction:  It is recommended to avoid tobacco use; be physically active; maintain a healthy weight; eat a diet rich in fruits, vegetables, and whole grains and low in saturated/trans fat, red meat, and processed meat; limit alcohol consumption (zero is best); protect against sexually transmitted infections; protect against the sun,  and avoid tanning beds; and get regular screenings for cancers as recommended by your healthcare team.    Regarding Your Relatives    Your relatives also may be at increased risk for certain cancers, depending upon their own personal and family history; therefore, it is important that they, too, ensure that their own healthcare providers are aware of and up-to-date on their personal and family history so that their medical management, including cancer screenings, can be based in part off of this information.      Additionally, it is possible for your relatives to have hereditary cancer susceptibility gene mutations even if/when you have negative genetic testing.      Your relatives should speak with a healthcare provider about their cancer risks and whether genetic counseling and potentially testing may be indicated for them.  Anyone interested in pursuing cancer genetic counseling/testing may contact the [facility] at [phone] to schedule an appointment or may visit [facility] [email or web address] to locate a genetic specialist near them.    Follow-up    Please continue to follow up with all healthcare providers as directed.    Moving forward, please update me with any changes to--or new information learned about--your personal or family history and with any relative's genetic testing results.  If at any point you wish to pursue hereditary cancer genetic testing, please reach out so I can facilitate that for you; otherwise, please follow up with me in 3 years.  In the meantime, please don't hesitate to reach out with any questions or concerns.        Follow-up:  Follow up in about 3 years (around 1/15/2028).  Follow up sooner as needed/desired, such as if genetic testing is desired.    Questions were encouraged and answered to the patient's satisfaction, and she verbalized understanding of the information and agreement with the plan.          Approximately 25 minutes were spent face-to-face with the patient.    Total  time:  Approximately 34 minutes.  This includes face to face time and non-face to face time preparing to see the patient (eg, review of tests), obtaining and/or reviewing separately obtained history, documenting clinical information in the electronic or other health record, independently interpreting results and communicating results to the patient/family/caregiver, or care coordinator.         Kash Francois, DNP, APRN, FNP-BC, AOCNP, CGRA  Nurse Practitioner, Cancer Genetics  Department of Hematology and Oncology  [facility]        CC:  Dr. Mauricio Hickman         Routed to Cancer Genetics Staff:  - Please place recall for 3 yrs.        Portions of the record may have been created with voice-recognition software.  Occasional wrong-word or sound-a-like substitutions may have occurred due to the inherent limitations of voice-recognition software.  Read the chart carefully, and recognize, using context, where substitutions have occurred.

## 2025-01-15 ENCOUNTER — OFFICE VISIT (OUTPATIENT)
Dept: HEMATOLOGY/ONCOLOGY | Facility: CLINIC | Age: 40
End: 2025-01-15
Payer: COMMERCIAL

## 2025-01-15 ENCOUNTER — PATIENT MESSAGE (OUTPATIENT)
Dept: HEMATOLOGY/ONCOLOGY | Facility: CLINIC | Age: 40
End: 2025-01-15

## 2025-01-15 DIAGNOSIS — Z91.89 AT INCREASED RISK OF BREAST CANCER: ICD-10-CM

## 2025-01-15 DIAGNOSIS — Z84.89: ICD-10-CM

## 2025-01-15 DIAGNOSIS — Z84.81 FAMILY HISTORY OF GENE MUTATION: ICD-10-CM

## 2025-01-15 DIAGNOSIS — Z71.83 ENCOUNTER FOR NONPROCREATIVE GENETIC COUNSELING: Primary | ICD-10-CM

## 2025-01-15 PROCEDURE — 99999 PR PBB SHADOW E&M-EST. PATIENT-LVL III: CPT | Mod: PBBFAC,,, | Performed by: NURSE PRACTITIONER

## 2025-01-15 PROCEDURE — 99203 OFFICE O/P NEW LOW 30 MIN: CPT | Mod: S$GLB,,, | Performed by: NURSE PRACTITIONER

## 2025-03-28 ENCOUNTER — TELEPHONE (OUTPATIENT)
Dept: HEMATOLOGY/ONCOLOGY | Facility: CLINIC | Age: 40
End: 2025-03-28
Payer: COMMERCIAL

## 2025-03-28 NOTE — TELEPHONE ENCOUNTER
Spoke with patient in regards to her currently scheduled appointment with Rufino Bear NP on 4/30 patient scheduled for 11am , patient was asked if she can come in for 1030am. Patient agreed with time change.   Patient appointment notes updated to reflect patient arrival time of 1030.    Patient education provided in regards to location and directions to office of scheduled appointment.    No further questions or concerns noted during call.

## 2025-04-17 ENCOUNTER — HOSPITAL ENCOUNTER (OUTPATIENT)
Dept: RADIOLOGY | Facility: OTHER | Age: 40
Discharge: HOME | End: 2025-04-17
Attending: INTERNAL MEDICINE
Payer: COMMERCIAL

## 2025-04-17 DIAGNOSIS — Z91.89 AT INCREASED RISK OF BREAST CANCER: ICD-10-CM

## 2025-04-17 PROCEDURE — 77049 MRI BREAST C-+ W/CAD BI: CPT | Mod: TC

## 2025-04-17 PROCEDURE — 25500020 PHARM REV CODE 255: Performed by: INTERNAL MEDICINE

## 2025-04-17 PROCEDURE — 77049 MRI BREAST C-+ W/CAD BI: CPT | Mod: 26,,, | Performed by: RADIOLOGY

## 2025-04-17 PROCEDURE — A9577 INJ MULTIHANCE: HCPCS | Performed by: INTERNAL MEDICINE

## 2025-04-17 RX ADMIN — GADOBENATE DIMEGLUMINE 16 ML: 529 INJECTION, SOLUTION INTRAVENOUS at 08:04

## 2025-04-28 ENCOUNTER — TELEPHONE (OUTPATIENT)
Dept: HEMATOLOGY/ONCOLOGY | Facility: CLINIC | Age: 40
End: 2025-04-28
Payer: COMMERCIAL

## 2025-04-28 NOTE — TELEPHONE ENCOUNTER
Spoke with patient to schedule her/30 appointment with SELAM Bear patient stated she was going to contact us to reschedule.   Patient stated she does not have her calendar in front of her at this time and will call the office to reschedule. Understanding verbalized.   No further questions or concerns noted during call.

## 2025-09-04 ENCOUNTER — TELEPHONE (OUTPATIENT)
Dept: FAMILY MEDICINE | Facility: CLINIC | Age: 40
End: 2025-09-04
Payer: COMMERCIAL

## (undated) DEVICE — CONTAINER SPECIMEN STRL 4OZ

## (undated) DEVICE — SPONGE LAP 18X18 PREWASHED

## (undated) DEVICE — COVER PROBE NL STRL 3.6X96IN

## (undated) DEVICE — TRAY MINOR GEN SURG

## (undated) DEVICE — SUT MCRYL PLUS 4-0 PS2 27IN

## (undated) DEVICE — GAUZE FLUFF XXLG 36X36 2 PLY

## (undated) DEVICE — SEE MEDLINE ITEM 146417

## (undated) DEVICE — PACK UNIVERSAL SPLIT II

## (undated) DEVICE — ELECTRODE REM PLYHSV RETURN 9

## (undated) DEVICE — DRAPE STERI INSTRUMENT 1018

## (undated) DEVICE — GAUZE SPONGE 4X4 12PLY

## (undated) DEVICE — ADHESIVE DERMABOND ADVANCED

## (undated) DEVICE — BRA POST SURGICAL WHT 40-42IN

## (undated) DEVICE — SUT VICRYL 3-0 27 SH

## (undated) DEVICE — ELECTRODE BLADE INSULATED 1 IN

## (undated) DEVICE — NDL 18GA X1 1/2 REG BEVEL

## (undated) DEVICE — SUT 2/0 30IN SILK BLK BRAI

## (undated) DEVICE — SOL 0.9% NACL IRRI.IN STERIL